# Patient Record
Sex: MALE | Race: OTHER | Employment: FULL TIME | URBAN - METROPOLITAN AREA
[De-identification: names, ages, dates, MRNs, and addresses within clinical notes are randomized per-mention and may not be internally consistent; named-entity substitution may affect disease eponyms.]

---

## 2018-08-07 ENCOUNTER — APPOINTMENT (OUTPATIENT)
Dept: CT IMAGING | Facility: HOSPITAL | Age: 53
DRG: 871 | End: 2018-08-07
Attending: EMERGENCY MEDICINE
Payer: COMMERCIAL

## 2018-08-07 ENCOUNTER — HOSPITAL ENCOUNTER (OUTPATIENT)
Age: 53
Discharge: ANOTHER HOSPITAL | End: 2018-08-07
Attending: EMERGENCY MEDICINE
Payer: COMMERCIAL

## 2018-08-07 ENCOUNTER — APPOINTMENT (OUTPATIENT)
Dept: ULTRASOUND IMAGING | Facility: HOSPITAL | Age: 53
DRG: 871 | End: 2018-08-07
Attending: EMERGENCY MEDICINE
Payer: COMMERCIAL

## 2018-08-07 VITALS
SYSTOLIC BLOOD PRESSURE: 134 MMHG | OXYGEN SATURATION: 95 % | DIASTOLIC BLOOD PRESSURE: 90 MMHG | HEART RATE: 108 BPM | RESPIRATION RATE: 20 BRPM

## 2018-08-07 DIAGNOSIS — R10.13 ABDOMINAL PAIN, EPIGASTRIC: Primary | ICD-10-CM

## 2018-08-07 DIAGNOSIS — R11.2 NAUSEA AND VOMITING IN ADULT: ICD-10-CM

## 2018-08-07 DIAGNOSIS — IMO0001 ALCOHOLISM /ALCOHOL ABUSE: ICD-10-CM

## 2018-08-07 PROBLEM — K85.20 ALCOHOL-INDUCED ACUTE PANCREATITIS: Status: ACTIVE | Noted: 2018-08-07

## 2018-08-07 PROBLEM — K85.20 ALCOHOL-INDUCED ACUTE PANCREATITIS, UNSPECIFIED COMPLICATION STATUS (HCC): Status: ACTIVE | Noted: 2018-08-07

## 2018-08-07 PROBLEM — K85.20 ALCOHOL-INDUCED ACUTE PANCREATITIS, UNSPECIFIED COMPLICATION STATUS: Status: ACTIVE | Noted: 2018-08-07

## 2018-08-07 PROBLEM — K85.20 ALCOHOL-INDUCED ACUTE PANCREATITIS (HCC): Status: ACTIVE | Noted: 2018-08-07

## 2018-08-07 LAB
#LYMPHOCYTE IC: 1.9 X10ˆ3/UL (ref 0.9–3.2)
#MXD IC: 0.8 X10ˆ3/UL (ref 0.1–1)
#NEUTROPHIL IC: 13.2 X10ˆ3/UL (ref 1.3–6.7)
CREAT SERPL-MCNC: 1.1 MG/DL (ref 0.7–1.3)
GLUCOSE BLD-MCNC: 205 MG/DL (ref 65–99)
GLUCOSE BLD-MCNC: 236 MG/DL (ref 70–99)
HCT IC: 52.2 % (ref 37–54)
HGB IC: 18.3 G/DL (ref 13–17)
ISTAT BUN: 16 MG/DL (ref 8–20)
ISTAT CHLORIDE: 102 MMOL/L (ref 101–111)
ISTAT HEMATOCRIT: 54 % (ref 37–53)
ISTAT IONIZED CALCIUM: 0.93 MMOL/L
ISTAT POTASSIUM: 3.5 MMOL/L (ref 3.6–5.1)
ISTAT SODIUM: 137 MMOL/L (ref 136–144)
LYMPHOCYTES NFR BLD AUTO: 12.2 %
MCH IC: 30.8 PG (ref 27–33.2)
MCHC IC: 35.1 G/DL (ref 31–37)
MCV IC: 87.9 FL (ref 80–99)
MIXED CELL %: 5.2 %
NEUTROPHILS NFR BLD AUTO: 82.6 %
PLT IC: 277 X10ˆ3/UL (ref 150–450)
RBC IC: 5.94 X10ˆ6/UL (ref 4.3–5.7)
WBC IC: 15.9 X10ˆ3/UL (ref 4–13)

## 2018-08-07 PROCEDURE — 80047 BASIC METABLC PNL IONIZED CA: CPT

## 2018-08-07 PROCEDURE — 93975 VASCULAR STUDY: CPT | Performed by: EMERGENCY MEDICINE

## 2018-08-07 PROCEDURE — 74177 CT ABD & PELVIS W/CONTRAST: CPT | Performed by: EMERGENCY MEDICINE

## 2018-08-07 PROCEDURE — 96374 THER/PROPH/DIAG INJ IV PUSH: CPT

## 2018-08-07 PROCEDURE — 99204 OFFICE O/P NEW MOD 45 MIN: CPT

## 2018-08-07 PROCEDURE — 85025 COMPLETE CBC W/AUTO DIFF WBC: CPT | Performed by: EMERGENCY MEDICINE

## 2018-08-07 PROCEDURE — 76870 US EXAM SCROTUM: CPT | Performed by: EMERGENCY MEDICINE

## 2018-08-07 PROCEDURE — 82962 GLUCOSE BLOOD TEST: CPT

## 2018-08-07 RX ORDER — SODIUM CHLORIDE 9 MG/ML
1000 INJECTION, SOLUTION INTRAVENOUS ONCE
Status: COMPLETED | OUTPATIENT
Start: 2018-08-07 | End: 2018-08-07

## 2018-08-07 RX ORDER — ONDANSETRON 2 MG/ML
4 INJECTION INTRAMUSCULAR; INTRAVENOUS ONCE
Status: COMPLETED | OUTPATIENT
Start: 2018-08-07 | End: 2018-08-07

## 2018-08-07 NOTE — ED INITIAL ASSESSMENT (HPI)
From [facility] with c/o abdominal pain and nausea sent to r/o pancreatitis. Large alcohol consumption 8 days ago.

## 2018-08-07 NOTE — ED PROVIDER NOTES
Patient Seen in: THE [facility] In [place]    History   Patient presents with:  Abdominal Pain  Nausea/Vomiting/Diarrhea (gastrointestinal)    Stated Complaint: v & d for 3 days     HPI    Patient is a poor historian secondary to his illness a midrange pupils, equal round reactive to light, extraocular movements are intact. Lids and lashes are normal.  Nose: Unremarkable   Throat: Posterior pharynx is normal.  Uvula midline nonswollen. Tongue is nonswollen.   Oromucosa is dry  Neck: Supple, non patient treated with IV fluid by bolus. He was treated with Zofran    Accu-Chek 664  Metabolic panel shows hypokalemia and hyperglycemia.   Creatinine normal  CBC shows elevated white count with a left shift on differential.  Hemoglobin elevated likely rel

## 2018-08-08 ENCOUNTER — APPOINTMENT (OUTPATIENT)
Dept: CV DIAGNOSTICS | Facility: HOSPITAL | Age: 53
DRG: 871 | End: 2018-08-08
Attending: HOSPITALIST
Payer: COMMERCIAL

## 2018-08-08 ENCOUNTER — APPOINTMENT (OUTPATIENT)
Dept: GENERAL RADIOLOGY | Facility: HOSPITAL | Age: 53
DRG: 871 | End: 2018-08-08
Attending: INTERNAL MEDICINE
Payer: COMMERCIAL

## 2018-08-08 PROCEDURE — 93306 TTE W/DOPPLER COMPLETE: CPT | Performed by: HOSPITALIST

## 2018-08-08 PROCEDURE — 71045 X-RAY EXAM CHEST 1 VIEW: CPT | Performed by: INTERNAL MEDICINE

## 2018-08-08 NOTE — PROGRESS NOTES
08/08/18 7039   Clinical Encounter Type   Visited With Patient and family together   Continue Visiting Yes   Crisis Visit Critical care  (issues dealing with alcohol - pt. and wife Marzette  want to talk about problem drinking)   Patient Spiritual Encounter

## 2018-08-08 NOTE — CONSULTS
[facility]  Report of Consultation    Omaira Gomez Patient Status:  Inpatient    1965 MRN [id number]   [facility] 3NE-A Attending Chelsea Brock MD   Hosp Day # 1 PCP Yi Lay MD     Reason for Consultation: Acute pancre ml   Output              575 ml   Net             -575 ml       Physical Exam:   General: alert, uncomfortable/in pain. No respiratory distress. Head: Normocephalic, without obvious abnormality, atraumatic. Eyes: Conjunctivae/corneas clear.   No sclera along with hemoconcentration  Left testicular pain of unclear etiology-urinalysis unremarkable, ultrasound of the testis unremarkable, CT scan of the abdomen without evidence of stone. Plan: Transfer to the intensive care unit. Fluid resuscitate.   Cult

## 2018-08-08 NOTE — PROGRESS NOTES
[facility]  Progress Note     Janet Burden Patient Status:  Inpatient    1965 MRN [id number]   [facility] 3NE-A Attending Safia Lozano MD   Hosp Day # 1 PCP Lori Parekh MD     Chief Complaint: abd pain  S:  Feels diaph 1. Acute pancreatitis suspect 2/2 ETOH  1. Bowel rest, IVF, IV analgesics   2. Sinus tachycardia- now 160 on exam. Afebrile. I suspect 2/2 above and ETOH  1. Stat EKG  2. Telemetry   3. Suspect chronic ETOH with w/d. CIWA 12  1. CIWA protocol   4.  Milta Lighter

## 2018-08-08 NOTE — ED PROVIDER NOTES
Patient Seen in: [facility] Emergency Department    History   Patient presents with:  Abdomen/Flank Pain (GI/)    Stated Complaint: from [facility] r/o pancreatitis     HPI    53-year-old male sent from urgent care for evaluation pancreatitis.   He was s and breath sounds normal. No stridor. Abdominal: Distended, diffusely tender. No CVA tenderness. Musculoskeletal: Exhibits no edema or tenderness. Neurological: Pt is alert and oriented to person, place, and time. No cranial nerve deficit.    Skin: Sk pancreatitis. Patient's EtOH is elevated, though interestingly, he states he has not drunk in a week. He is particularly tender, obtain a CT abdomen pelvis to assure that there is nothing more serious going on such as a perforated ulcer.   Additionally,

## 2018-08-08 NOTE — PROGRESS NOTES
Patient seen again in the intensive care unit. He is awake and interactive. He states he feels better. He has no pain in his testicle at present but notes ongoing abdominal discomfort. He is thirsty. Chest is clear. He is markedly tachycardic.   2D ec

## 2018-08-08 NOTE — H&P
[facility]  History and Physical     Radha Billingsley Patient Status:  Emergency    1965 MRN [id number]   Location [address] [facility] Attending Andriy Holm MD   Hosp Day # 0 PCP Emily Lorenzana MD     Chief Complaint: Ritesh Jacques auscultation bilaterally. No wheezes. No rhonchi. Cardiovascular: S1, S2. Regular rate and rhythm. No murmurs, rubs or gallops. Equal pulses. Chest and Back: No tenderness or deformity.   Abdomen: Midepigastric tenderness  Neurologic: No focal neurologic

## 2018-08-08 NOTE — PLAN OF CARE
Received report from [facility], Katherine Nation , [facility] at [phone]. Pt. Is very drowsy but able to answer questions but is confused, stating he drank 8 days ago, ETOH level on arrival was 291.   IV Thiamine given  Banana Bag running, then LR  EDWARD, Tele:   Seizure precautions Progressing    • Hemodynamic stability and optimal renal function maintained Progressing        NEUROLOGICAL - ADULT    • Achieves stable or improved neurological status Progressing    • Absence of seizures Progressing    • Remains free of injury related t

## 2018-08-08 NOTE — PLAN OF CARE
AOx4  HR elevated, MD aware  CIWA protocol  Electrolyte protocol  Banana bag   2L Lactated ringers  Transferred to ICU, report called to [address]    • Will report anxiety at manageable levels Progressing        CARDIOVASCULAR - ADULT    • Gabrielle RESPIRATORY - ADULT    • Achieves optimal ventilation and oxygenation Progressing        RISK FOR INFECTION - ADULT    • Absence of fever/infection during anticipated neutropenic period Progressing        SAFETY ADULT - FALL    • Free from fall injury Pr

## 2018-08-08 NOTE — CONSULTS
[facility]                       [facility]ation[facility]    Johnna Michael Patient Status:  Inpatient    1965 MRN [id number]   [facility] 4SW-A Attending Kit MD Michael   Hosp Day # 1 PCP HCl (ZOFRAN) injection 4 mg 4 mg Intravenous Once   [] ondansetron HCl (ZOFRAN) 4 MG/2ML injection      [COMPLETED] ondansetron HCl (ZOFRAN) injection 4 mg 4 mg Intravenous Once   [COMPLETED] famoTIDine (PEPCID) injection 20 mg 20 mg Intravenous Onc chest pain, or palpitations            Respiratory: No shortness of breath, asthma, copd, recurrent pneumonia            Hematologic: The patient reports no easy bruising, frequent gum bleeding or nose bleeding;   The patient has no history of known chronic CREATSERUM 0.94 08/08/2018   BUN 19 08/08/2018    08/08/2018   K 3.9 08/08/2018    08/08/2018   CO2 18.0 08/08/2018    08/08/2018   CA 7.6 08/08/2018   ALB 3.3 08/08/2018   ALKPHO 96 08/08/2018   BILT 1.7 08/08/2018    08/08/201 dilatation. No discrete pancreatic lesion is seen. SPLEEN:  No enlargement or focal lesion. KIDNEYS:  No mass, obstruction, or calcification. ADRENALS:  No mass or enlargement. AORTA/VASCULAR:  No aneurysm or dissection.     RETROPERITONEUM:  Per  ml/hr once bolus is complete  2. NPO  3. Consider repeat CT if leukocytosis fails to improve  4. IS hourly once heart rate improves  5. Pain control per PCP recommendations  6. Zofran 4 mg IV q 6 hours as needed for nausea  7.  Repeat CMP, CBC, Mg in

## 2018-08-08 NOTE — CONSULTS
[facility] Urology   Consultation Note    Barry Maria Patient Status:  Inpatient    1965 MRN [id number]   [facility] 4SW-A Attending Odalis Justin MD   Hosp Day # 1 PCP Modesta Marquez MD     Reason for Consultation:  Test morphINE sulfate (PF) 4 MG/ML injection 1 mg, 1 mg, Intravenous, Q2H PRN **OR** morphINE sulfate (PF) 4 MG/ML injection 2 mg, 2 mg, Intravenous, Q2H PRN **OR** morphINE sulfate (PF) 4 MG/ML injection 4 mg, 4 mg, Intravenous, Q2H PRN  •  LORazepam (ATIVAN) suggestive of diffuse fatty infiltration of the liver with focal areas of fatty sparing. BILIARY:  No visible dilatation or calcification.     PANCREAS:  Peripancreatic inflammatory changes are present with some retroperitoneal fluid and peripancreatic fat Alcohol-induced acute pancreatitis     Alcohol-induced acute pancreatitis, unspecified complication status     Sinus tachycardia     Alcohol withdrawal syndrome with complication (HCC)     Tremors of nervous system      Left testicular pain  Testicular exa

## 2018-08-08 NOTE — PROGRESS NOTES
[facility] Pharmacy Note: Antimicrobial Weight Dose Adjustment for: piperacillin/tazobactam (Fanny Evans)    Omaira Gomez is a 48year old male who has been prescribed piperacillin/tazobactam (ZOSYN) 3.375 gm every 8 hours.   CrCl is estimated creatinine clearance is 1

## 2018-08-08 NOTE — ED NOTES
Pt denies testicular pain at this time, abdominal pain continues 5/10. Awaiting ultrasound.  Will continue to monitor

## 2018-08-08 NOTE — PAYOR COMM NOTE
--------------  ADMISSION REVIEW     Payor: [employer]  Subscriber #:  [id number]  Authorization Number: N/A    Admit date: 8/7/18  Admit time: 2308 8/8/18 transferred to ICU      Admitting Physician: Tanesha Lincoln MD  Attending Physician:  Justin Alicia Current:/[id number]   Pulse 101   Temp 98.4 °F (36.9 °C) (Temporal)   Resp 18   Ht 185.4 cm (6' 1\")   Wt 108.9 kg   SpO2 94%   BMI 31.66 kg/m²          Physical Exam      Constitutional: No distress. Appears well-developed and well-nourished.    Head: Normoc ---------                               -----------         ------                     CBC W/ DIFFERENTIAL[[id number]]          Abnormal            Final result                 Please view results for these tests on the individual orders.    URINALYSIS WITH History of Present Illness: Junito Beth is a 48year old male with no known past medical history presents to the emergency department with midepigastric abdominal pain.   Patient reports to me that his last drink was 8 days ago,  for his daughter's 15th b Integument: No rashes or lesions. Psychiatric: Appropriate mood and affect.       Diagnostic Data:      Labs:  Recent Labs   Lab  08/07/18   1730  08/07/18   1835   WBC   --   15.7*   HGB   --   18.3*   MCV  87.9  86.2   PLT   --   263.0       Recent Labs 8/7/2018 1931 Given 20 mg Intravenous Juliet Corona      iohexol (OMNIPAQUE) 350 MG/ML injection 100 mL     Date Action Dose Route User    8/7/2018 2012 Given 100 mL Intravenous Magdalena Ramirez      LORazepam (ATIVAN) injection 1 mg     Date Action Do Admitted on 8/7/2018    Discharged on 8/7/2018 8/7/2018 1740 New Bag 1000 mL Intravenous Enma PRADO RN      sodium chloride 0.9% IV bolus 1,000 mL     Date Action Dose Route User    8/7/2018 2000 New Bag 1000 mL Intravenous Casey Maza

## 2018-08-08 NOTE — ED NOTES
Report received from [facility] at 2160. Pt received resting on cart,c/o left testicular pain 10/10 that patient states started sudden onset in the last 20 minutes. Dr Milton Mabry aware orders received.

## 2018-08-09 ENCOUNTER — APPOINTMENT (OUTPATIENT)
Dept: GENERAL RADIOLOGY | Facility: HOSPITAL | Age: 53
DRG: 871 | End: 2018-08-09
Attending: RADIOLOGY
Payer: COMMERCIAL

## 2018-08-09 ENCOUNTER — APPOINTMENT (OUTPATIENT)
Dept: GENERAL RADIOLOGY | Facility: HOSPITAL | Age: 53
DRG: 871 | End: 2018-08-09
Attending: INTERNAL MEDICINE
Payer: COMMERCIAL

## 2018-08-09 ENCOUNTER — APPOINTMENT (OUTPATIENT)
Dept: ULTRASOUND IMAGING | Facility: HOSPITAL | Age: 53
DRG: 871 | End: 2018-08-09
Attending: PHYSICIAN ASSISTANT
Payer: COMMERCIAL

## 2018-08-09 ENCOUNTER — APPOINTMENT (OUTPATIENT)
Dept: INTERVENTIONAL RADIOLOGY/VASCULAR | Facility: HOSPITAL | Age: 53
DRG: 871 | End: 2018-08-09
Attending: INTERNAL MEDICINE
Payer: COMMERCIAL

## 2018-08-09 PROCEDURE — 71045 X-RAY EXAM CHEST 1 VIEW: CPT | Performed by: INTERNAL MEDICINE

## 2018-08-09 PROCEDURE — 93970 EXTREMITY STUDY: CPT | Performed by: PHYSICIAN ASSISTANT

## 2018-08-09 PROCEDURE — 71045 X-RAY EXAM CHEST 1 VIEW: CPT | Performed by: RADIOLOGY

## 2018-08-09 NOTE — CONSULTS
[facility]  Report of Consultation    Syl Houston Patient Status:  Inpatient    1965 MRN [id number]   [facility] 4SW-A Attending Abby Sung MD   Hosp Day # 2 PCP Nina Suarez MD       Assessment / Plan:    1) MITCH- due to % injection 50 mL, 50 mL, Intravenous, Q15 Min PRN **OR** glucose (DEX4) oral liquid 30 g, 30 g, Oral, Q15 Min PRN **OR** Glucose-Vitamin C (DEX-4) 4-0.006 g chewable tab 8 tablet, 8 tablet, Oral, Q15 Min PRN  •  Insulin Aspart Pen (NOVOLOG) 100 UNIT/ML fl 161   Temp 100 °F (37.8 °C) (Temporal)   Resp 24   Ht 73\"   Wt 231 lb 4.2 oz   SpO2 96%   BMI 30.51 kg/m²   Temp (24hrs), Av.4 °F (36.9 °C), Min:96.8 °F (36 °C), Max:100 °F (37.8 °C)       Intake/Output Summary (Last 24 hours) at 18 1249  Last d

## 2018-08-09 NOTE — PROGRESS NOTES
08/09/18 1501   Clinical Encounter Type   Visited With Patient and family together  (wife and pt's sister at bedside)   Routine Visit Follow-up   Continue Visiting No   Crisis Visit Critical care   Referral From    Referral To Radha Hassan

## 2018-08-09 NOTE — CONSULTS
[facility]  Cardiology Consultation    [facility] Patient Status:  Inpatient    1965 MRN [id number]   [facility] Att[facility] Fidel Tamayo MD   Hosp Day # 2 PCP Ole Dumont MD     Reason for [facility] 500 mL with INFUVITE ADULT 10 mL, Thiamine HCl 989 mg, folic acid 2 mg, magnesium sulfate 8.1001 mEq, Potassium Phosphate Dibasic 40 mmol infusion, , Intravenous, Continuous  •  ondansetron HCl (ZOFRAN) injection 4 mg, 4 mg, Intravenous, Q6H PRN  •  Normal Readings from Last 3 Encounters:  08/09/18 : 231 lb 4.2 oz (104.9 kg)      Physical Exam:   General: mood depressed, lethargic  HEENT: Normocephalic, anicteric sclera, neck supple. Neck: No JVD, carotids 2+, no bruits.   Cardiac: tachy, S1, S2  Lungs: Aretha

## 2018-08-09 NOTE — PLAN OF CARE
CARDIOVASCULAR - ADULT    • Maintains optimal cardiac output and hemodynamic stability Not Progressing        GASTROINTESTINAL - ADULT    • Minimal or absence of nausea and vomiting Not Progressing          DRUG ABUSE/DETOX    • Will have no detox symptoms

## 2018-08-09 NOTE — PROGRESS NOTES
[facility]  Urology Progress Note    Junito Beth Patient Status:  Inpatient    1965 MRN [id number]   [facility] 4SW-A Attending Bernardino Aguirre MD   Hosp Day # 2 PCP Tegan Dunaway MD     Subjective:  Junito Beth is a(n) 48 ye

## 2018-08-09 NOTE — PROGRESS NOTES
Patient tachycardic in 160-170. Assessed and lung sounds clear.       -Give 1 liter of LR  -Give 2 amp D5W bicarb gtt x 1 bag (at 170 ml/hr)  -increase LR infusion after to 200ml/hr  -repeat BMP at 0200  -watch respiratory status closely    Updated Dr SLIM

## 2018-08-09 NOTE — BH PROGRESS NOTE
Talked with the pts nurse and she said, he is not able to have the halle assessment done at this time due to ams. Will check on him tomorrow.

## 2018-08-09 NOTE — PROGRESS NOTES
[facility]  Progress Note     Antony Lopez Patient Status:  Inpatient    1965 MRN [id number]   [facility] 3NE-A Attending Dario Mas MD   Hosp Day # 2 PCP Jovita Moore MD     Chief Complaint: abd pain  S:  Patient dec 5.0*     Recent Labs   Lab  08/07/18   2327  08/09/18   0235   PTP  15.8*  26.7*   INR  1.28*  2.37*     Recent Labs   Lab  08/09/18   0235   TROP  0.118*        Imaging: Imaging data reviewed in Epic.   Medications:   • Insulin Aspart Pen  2-10 Units Subcu

## 2018-08-09 NOTE — PLAN OF CARE
CIWA 4-14, IVP morphine for pain, cards consult, renal consult. Pt remains markedly tachycardic (Sinus), sustained HR 160s throughout the shift. MD's aware. Plan to tx underlying causes (alcohol withdrawal & acute severe pancreatitis).   Lab results boris

## 2018-08-09 NOTE — PROGRESS NOTES
[facility]  Progress Note    Do Hradin Patient Status:  Inpatient    1965 MRN [id number]   [facility] 4SW-A Attending Fidel Tamayo MD   Hosp Day # 2 PCP Ole Dumont MD     Subjective:  Do Hardin is a(n) 48year old renal failure  Leukocytosis  Piuria  Presumed sepsis on antibiotics    Plan:  NPO  IV fluid and electrolyte replacement  Reevaluate tomorrow for [place] placement and trial of enteral nutrition   Appreciate renal input    Total time spent in the care of the pat

## 2018-08-09 NOTE — PLAN OF CARE
Assumed care at [address] [place] [address]. Received patient orientated. Became more confused overnight -- APN aware and kept updated. Ammonia level checked -- APN aware of results. See flowsheet for further assessment. CIWA. Protocol followed, see flowsheets.   Seizure pre

## 2018-08-09 NOTE — PROGRESS NOTES
[facility]  Progress Note    Migdalia Daniel Patient Status:  Inpatient    1965 MRN [id number]   [facility] 4SW-A Attending Cecil Morgan MD   Hosp Day # 2 PCP Venkatesh Butler MD     STATUS UPDATE: The patient has had a \"rough\" n thyromegaly. Lungs: Clear without wheezes or crackles   Chest wall: No tenderness or deformity. Heart: Regular rate and rhythm, regular presumed sinus, normal S1S2, no murmur.    Abdomen: Soft, non-tender, moderately distended/not tense, no masses, no g testicular pain of unclear etiology-urinalysis unremarkable, ultrasound of the testis unremarkable, CT scan of the abdomen without evidence of stone. Urology consult appreciated-no significant testicular pathology suspected  5.  Evolving renal insufficienc

## 2018-08-10 ENCOUNTER — APPOINTMENT (OUTPATIENT)
Dept: GENERAL RADIOLOGY | Facility: HOSPITAL | Age: 53
DRG: 871 | End: 2018-08-10
Attending: INTERNAL MEDICINE
Payer: COMMERCIAL

## 2018-08-10 PROCEDURE — 71045 X-RAY EXAM CHEST 1 VIEW: CPT | Performed by: INTERNAL MEDICINE

## 2018-08-10 NOTE — PLAN OF CARE
ANXIETY    • Will report anxiety at manageable levels Not Progressing        Delirium    • Minimize duration of delirium Not Progressing        DISCHARGE PLANNING    • Discharge to home or other facility with appropriate resources Not Progressing within normal limits Progressing        NEUROLOGICAL - ADULT    • Absence of seizures Progressing    • Remains free of injury related to seizure activity Progressing        PAIN - ADULT    • Verbalizes/displays adequate comfort level or patient's stated pa

## 2018-08-10 NOTE — PROGRESS NOTES
[facility]  Progress Note     Jeb Kareneleuterio Patient Status:  Inpatient    1965 MRN [id number]   [facility] 3NE-A Attending Bryson Garcia MD   Hosp Day # 3 PCP Tanesha Holguin MD     Chief Complaint: abd pain    S:  Patient l 8.4*   --   3.7*   TP  7.0   --   5.0*   --   5.3*    < > = values in this interval not displayed.      Recent Labs   Lab  08/07/18   2327  08/09/18   0235  08/10/18   0434   PTP  15.8*  16.3*  15.7*   INR  1.28*  1.34*  1.20*     Recent Labs   Lab  08/09/1 full  · Nina: yes - UO improved    Estimated date of discharge: TBD    Case d/w spouse, RN.        Byron TERRY  10:17 AM     Addendum  Patient seen and examined  Remains confused but looks little better today  Chest: Diminished B/L  CVS: S1, S2, tac

## 2018-08-10 NOTE — PROGRESS NOTES
[facility]  Progress Note    Kamla Weathers Patient Status:  Inpatient    1965 MRN [id number]   [facility] 4SW-A Attending Julien Grullon MD   Hosp Day # 3 PCP Tyrel Robertson MD     Subjective:  Kamla Weathers is a(n) 48year old failure  Leukocytosis  Piuria culture negative  Presumed sepsis on antibiotics    Plan:  NPO  IV fluid and electrolyte replacement  Reevaluate tomorrow for NJ placement and trial of enteral nutrition once bowel more active  Appreciate renal input    Total

## 2018-08-10 NOTE — BH PROGRESS NOTE
Talked with the pts nurse, Kj Ta and she said, the pt is not assessable yet. The pt will be checked on by halle at a later time.

## 2018-08-10 NOTE — PROGRESS NOTES
[facility]  Progress Note    Barry Maria Patient Status:  Inpatient    1965 MRN [id number]   [facility] 4SW-A Attending Odalis Justin MD   Hosp Day # 3 PCP Modesta Marquez MD     Subjective:  Barry Maria is a(n) 48year old m Lab  08/08/18   0615  08/09/18   0235  08/10/18   0434   RBC  6.44*  5.86*  4.73   HGB  19.6*  18.3*  14.4   HCT  56.7*  52.3  42.9   MCV  88.0  89.2  90.7   MCH  30.4  31.2  30.4   MCHC  34.6  35.0  33.6   RDW  13.0  13.4  13.4   NEPRELIM  25.98*  27.67 loaded. His chest x-ray is showing some issues with pleural effusion and atelectasis. Hemoconcentration is better. Coagulation studies appear to have responded to vitamin K administration-a good sign as regards liver function. Bilirubin is decreasing.

## 2018-08-10 NOTE — PROGRESS NOTES
[address] [place] [address] Specialists. Seen in ICU. Still sleepy and lethargic. According to family he was asking about work earlier in the day.     BP 94/65 (BP Location: Left arm)   Pulse (!) 122   Temp 99.5 °F (37.5 °C) (Temporal)   Resp 21   Ht 6' 1\" (1.854 m

## 2018-08-10 NOTE — PROGRESS NOTES
[facility]  Nephrology Progress Note    Alan Rose Attending:  Olga Marin MD       Assessment and Plan:    1) MITCH- due to hypovolemia, pancreatitis and contrast nephropathy (CT 8/7) now with improving UO and Cr plateauing with resolution of me 08/10/2018   CO2 29.0 08/10/2018    08/10/2018   CA 7.6 08/10/2018   ALB 1.9 08/10/2018   ALKPHO 65 08/10/2018   BILT 3.7 08/10/2018   TP 5.3 08/10/2018   AST 86 08/10/2018   ALT 90 08/10/2018   PTT 33.6 08/10/2018   INR 1.20 08/10/2018   PTP 15.7 0 PRN   lactated ringers infusion  Intravenous Continuous   LORazepam (ATIVAN) injection 1 mg 1 mg Intravenous Q30 Min PRN   LORazepam (ATIVAN) injection 2 mg 2 mg Intravenous Q15 Min PRN   LORazepam (ATIVAN) injection 3 mg 3 mg Intravenous Q15 Min PRN   TONG

## 2018-08-10 NOTE — PLAN OF CARE
GASTROINTESTINAL - ADULT    • Maintains or returns to baseline bowel function Not Progressing          RESPIRATORY - ADULT    • Achieves optimal ventilation and oxygenation Not Progressing          RISK FOR INFECTION - ADULT    • Absence of fever/infection

## 2018-08-10 NOTE — DIETARY NOTE
[facility]    NUTRITION INITIAL ASSESSMENT    Pt does not meet malnutrition criteria. NUTRITION DIAGNOSIS/PROBLEM:    Inadequate oral intake related to physiological causes as evidenced by pt NPO x 3 days and anticipated need for EN.     NUTRITION I 83.6 kg  Calories: 3150-1637 calories/day (25-30 calories per kg)  Protein: 100-125 grams protein/day (1.2-1.5 grams protein per kg)  Fluid: ~1 ml/kcal or per MD discretion    MONITOR AND EVALUATE/NUTRITION GOALS:    3. No signs of skin breakdown  4.  Maint

## 2018-08-10 NOTE — PAYOR COMM NOTE
--------------  CONTINUED STAY REVIEW    Payor: [employer]  Subscriber #:  [id number]  Authorization Number: [id number]    Admit date: 8/7/18  Admit time: 2308    Admitting Physician: Librado Castro MD  Attending Physician:  Abby Sung MD  Primary Car abuse  Etoh withdrawal  Etoh liver disease, hepatitis LFT's improving  Acute renal failure  Leukocytosis  Piuria culture negative  Presumed sepsis on antibiotics     Plan:  NPO  IV fluid and electrolyte replacement  Reevaluate tomorrow for NJ placement and (none) Intravenous Paula Lozano RN    8/9/2018 2038 New Bag (none) Intravenous Crystal Lyons, RN      dextrose 5 % 500 mL with INFUVITE ADULT 10 mL, Thiamine HCl 615 mg, folic acid 2 mg, magnesium sulfate 8.1001 mEq, Potassium Phosphate Dibasic 40

## 2018-08-11 ENCOUNTER — APPOINTMENT (OUTPATIENT)
Dept: GENERAL RADIOLOGY | Facility: HOSPITAL | Age: 53
DRG: 871 | End: 2018-08-11
Attending: INTERNAL MEDICINE
Payer: COMMERCIAL

## 2018-08-11 PROCEDURE — 71045 X-RAY EXAM CHEST 1 VIEW: CPT | Performed by: INTERNAL MEDICINE

## 2018-08-11 NOTE — PROGRESS NOTES
[facility]  Progress Note    Migdalia Daniel Patient Status:  Inpatient    1965 MRN [id number]   [facility] 4SW-A Attending Cecil Morgan MD   Hosp Day # 4 PCP Venkatesh Butler MD     Subjective:  Migdalia Daniel is a(n) 48year old m 2.76*  2.68*  2.10*   GFRAA  29*  30*  41*   GFRNAA  25*  26*  35*   CA  7.4*  7.6*  7.8*   NA  137  139  145*   K  4.6  3.9  4.0   CL  101  103  107   CO2  27.0  29.0  33.0*     Recent Labs   Lab  08/11/18   0738   ABGPHT  7.44   LAOGWJ4O  45   BJPUD4M  7

## 2018-08-11 NOTE — PLAN OF CARE
Assumed care at [address] [place] [address] Road. Lethargic but becomes restless when stimulated. See full assessment on flowsheet. CIWA. Precedex drip. PRN ativan per protocol. Seizure precautions. Received on 6L NC.  ST/SR. CVPs Q4H.  NPO. Accuchecks Q6H. Nina.   Bedres

## 2018-08-11 NOTE — PROGRESS NOTES
[facility]  Progress Note     Migdalia Yu Patient Status:  Inpatient    1965 MRN [id number]   [facility] 3NE-A Attending Alec Gloria MD   Hosp Day # 4 PCP Dorian Griffin MD     Chief Complaint: abd pain    S:  Patient l 1.20*  1.16*     Recent Labs   Lab  08/09/18   0235   TROP  0.118*        Imaging: Imaging data reviewed in Epic.   Medications:   • Ipratropium Bromide  0.5 mg Nebulization 4 times per day   • insulin detemir  6 Units Subcutaneous Daily   • Insulin Aspart

## 2018-08-11 NOTE — PLAN OF CARE
DECISION MAKING    • Pt/Family able to effectively weigh alternatives and participate in decision making related to treatment and care Not Progressing        Delirium    • Minimize duration of delirium Not Progressing        DRUG ABUSE/DETOX    • Will have Goal Progressing        RESPIRATORY - ADULT    • Achieves optimal ventilation and oxygenation Progressing        RISK FOR INFECTION - ADULT    • Absence of fever/infection during anticipated neutropenic period Progressing        SAFETY ADULT - FALL    • Fr

## 2018-08-11 NOTE — PROGRESS NOTES
[facility]  Nephrology Progress Note    Barry Maria Attending:  Odalis Justin MD       Assessment and Plan:    1) MITCH- due to hypovolemia, pancreatitis and contrast nephropathy (CT 8/7) now with excellent UO, decreasing Cr and resolution of metabo 7.8 08/11/2018   ALB 1.7 08/11/2018   ALKPHO 63 08/11/2018   BILT 2.9 08/11/2018   TP 5.5 08/11/2018   AST 55 08/11/2018   ALT 60 08/11/2018   PTT 35.6 08/11/2018   INR 1.16 08/11/2018   PTP 15.3 08/11/2018   MG 3.2 08/11/2018   PHOS 4.3 08/11/2018   PGLU injection 1 mg 1 mg Intravenous Q30 Min PRN   LORazepam (ATIVAN) injection 2 mg 2 mg Intravenous Q15 Min PRN   LORazepam (ATIVAN) injection 3 mg 3 mg Intravenous Q15 Min PRN   LORazepam (ATIVAN) injection 4 mg 4 mg Intravenous Q10 Min PRN   acetaminophen (

## 2018-08-11 NOTE — PROGRESS NOTES
[facility]  Progress Note    Radha Billingsley Patient Status:  Inpatient    1965 MRN [id number]   [facility] 4SW-A Attending Ke Law MD   Hosp Day # 4 PCP Emily Lorenzana MD     Subjective:  Radha Billingsley is a(n) 48year old improving  Leukocytosis resolved    Plan:  NPO  IV fluid and electrolyte replacement  Reevaluate tomorrow for NJ placement and trial of enteral nutrition once bowel more active  Swallow evaluation when more awake    Total time spent in the care of the aby

## 2018-08-12 NOTE — PROGRESS NOTES
[facility]  Progress Note    Sj Villalobos Patient Status:  Inpatient    1965 MRN [id number]   [facility] 4SW-A Attending Laci Whittaker MD   Hosp Day # 5 PCP Herman Hung MD     Subjective:  Sj Villalobos is a(n) 48year old m 35*   CREATSERUM  2.68*  2.10*  1.34*   GFRAA  30*  41*  70   GFRNAA  26*  35*  61   CA  7.6*  7.8*  7.8*   NA  139  145*  147*   K  3.9  4.0  4.2   CL  103  107  111   CO2  29.0  33.0*  32.0     Recent Labs   Lab  08/11/18   0738   ABGPHT  7.44   ABGPCO2

## 2018-08-12 NOTE — PROGRESS NOTES
[facility]  Progress Note    Migdalia Daniel Patient Status:  Inpatient    1965 MRN [id number]   [facility] 4SW-A Attending Cecil Morgan MD   Hosp Day # 5 PCP Venkatesh Butler MD     Subjective:  Migdalia Daniel is a(n) 48year old improving  Leukocytosis resolved  Normocytic anemia    Plan:  NPO  IV fluid and electrolyte replacement  Swallow evaluation tomorrow  Iron studies, B12 and folate levels    Total time spent in the care of the patient today: 25 minutes.     Jalen Fagan

## 2018-08-12 NOTE — PROGRESS NOTES
[facility]  Progress Note     Sj Villalobos Patient Status:  Inpatient    1965 MRN [id number]   [facility] 3NE-A Attending Laci Whittaker MD   Hosp Day # 5 PCP Herman Hung MD     Chief Complaint: abd pain    S:  Patient m 0.5 mg Nebulization 4 times per day   • insulin detemir  6 Units Subcutaneous Daily   • Insulin Aspart Pen  2-10 Units Subcutaneous Q6H   • Normal Saline Flush  10 mL Intravenous Q12H   • piperacillin-tazobactam  4.5 g Intravenous Q8H     ASSESSMENT / PLAN giving ativan  abx per Intensivist  Appreciate consults     Talita Youngblood MD

## 2018-08-12 NOTE — PLAN OF CARE
GASTROINTESTINAL - ADULT    • Maintains or returns to baseline bowel function Not Progressing        NEUROLOGICAL - ADULT    • Achieves maximal functionality and self care Not Progressing          ANXIETY    • Will report anxiety at manageable levels Progr Goal Progressing    • Patient/Family Short Term Goal Progressing        RESPIRATORY - ADULT    • Achieves optimal ventilation and oxygenation Progressing        RISK FOR INFECTION - ADULT    • Absence of fever/infection during anticipated neutropenic perio

## 2018-08-12 NOTE — PLAN OF CARE
ANXIETY    • Will report anxiety at manageable levels Progressing        CARDIOVASCULAR - ADULT    • Maintains optimal cardiac output and hemodynamic stability Progressing    • Absence of cardiac arrhythmias or at baseline Progressing        COPING    • Pt RESPIRATORY - ADULT    • Achieves optimal ventilation and oxygenation Progressing        RISK FOR INFECTION - ADULT    • Absence of fever/infection during anticipated neutropenic period Progressing        SAFETY ADULT - FALL    • Free from fall injury Pr

## 2018-08-12 NOTE — PROGRESS NOTES
[facility]  Nephrology Progress Note    Sj Villalobos Attending:  Laci Whittaker MD       Assessment and Plan:    1) MITCH- due to hypovolemia, pancreatitis and contrast nephropathy (CT 8/7) now with excellent UO, decreasing Cr and resolution of metabo BUN 35 08/12/2018    08/12/2018   K 4.2 08/12/2018    08/12/2018   CO2 32.0 08/12/2018    08/12/2018   CA 7.8 08/12/2018   ALB 1.7 08/12/2018   ALKPHO 82 08/12/2018   BILT 2.4 08/12/2018   TP 5.5 08/12/2018   AST 72 08/12/2018   ALT 60 3 mg Intravenous Q15 Min PRN   LORazepam (ATIVAN) injection 4 mg 4 mg Intravenous Q10 Min PRN   acetaminophen (TYLENOL) tab 650 mg 650 mg Oral Q6H PRN   morphINE sulfate (PF) 4 MG/ML injection 1 mg 1 mg Intravenous Q2H PRN   Or      morphINE sulfate (PF) 4

## 2018-08-13 ENCOUNTER — APPOINTMENT (OUTPATIENT)
Dept: CT IMAGING | Facility: HOSPITAL | Age: 53
DRG: 871 | End: 2018-08-13
Attending: NURSE PRACTITIONER
Payer: COMMERCIAL

## 2018-08-13 ENCOUNTER — APPOINTMENT (OUTPATIENT)
Dept: CT IMAGING | Facility: HOSPITAL | Age: 53
DRG: 871 | End: 2018-08-13
Attending: INTERNAL MEDICINE
Payer: COMMERCIAL

## 2018-08-13 ENCOUNTER — APPOINTMENT (OUTPATIENT)
Dept: GENERAL RADIOLOGY | Facility: HOSPITAL | Age: 53
DRG: 871 | End: 2018-08-13
Attending: INTERNAL MEDICINE
Payer: COMMERCIAL

## 2018-08-13 PROCEDURE — 99152 MOD SED SAME PHYS/QHP 5/>YRS: CPT | Performed by: NURSE PRACTITIONER

## 2018-08-13 PROCEDURE — 99153 MOD SED SAME PHYS/QHP EA: CPT | Performed by: NURSE PRACTITIONER

## 2018-08-13 PROCEDURE — 74177 CT ABD & PELVIS W/CONTRAST: CPT | Performed by: INTERNAL MEDICINE

## 2018-08-13 PROCEDURE — 71045 X-RAY EXAM CHEST 1 VIEW: CPT | Performed by: INTERNAL MEDICINE

## 2018-08-13 PROCEDURE — 49406 IMAGE CATH FLUID PERI/RETRO: CPT | Performed by: NURSE PRACTITIONER

## 2018-08-13 NOTE — PROGRESS NOTES
[facility]    Progress Note    Dorcus Grim Patient Status:  Inpatient    1965 MRN [id number]   [facility] 4SW-A Attending Kerry Gomez MD   Hosp Day # 6 PCP Linda Park MD        Subjective:     Constitutional: Negative. 0.147 (L) 08/08/2018    (H) 08/12/2018   DDIMER 4.20 (H) 08/09/2018   MG 2.4 08/13/2018   PHOS 3.3 08/12/2018   TROP 0.118 (HH) 08/09/2018   B12 >2,000 (H) 08/12/2018   ETOH 291 (H) 08/07/2018             Oscar Prater MD  8/13/2018

## 2018-08-13 NOTE — PAYOR COMM NOTE
--------------  CONTINUED STAY REVIEW    Payor: [facility] [facility]  Subscriber #:  [id number]  Authorization Number: [id number]    Admit date: 8/7/18  Admit time: 2308    Admitting Physician: Aundrea Armstrong MD  Attending Physician:  Bernardino Aguirre MD  Primary Car Recent Labs   Lab  08/10/18   0434  08/11/18   0447  08/12/18   0426   PTP  15.7*  15.3*  14.9*   INR  1.20*  1.16*  1.12*          Recent Labs   Lab  08/09/18   0235   TROP  0.118*      Imaging: Imaging data reviewed in Epic.   Medications:   • Insulin Asp Pt clinically deteriorating  Would cont abx for possible infected pancreatitis with low grade fever.     I am more suspecting necrotizing pancreatitis  Check CT Abdomen  Agree with TPN for now  Blood Cx sent  Check UA  Appreciate Consults         Kwabena Watts Impression  Acute interstitial pancreatitis  Etoh abuse  Etoh withdrawal  Etoh liver disease, hepatitis LFT's improving  Acute renal failure improving  Leukocytosis resolved  Normocytic anemia     Plan:  NPO  IV fluid and electrolyte replacement  Swallow e 08/13/18 0900  104.1 °F (40.1 °C) 106 20 145/89 95 % -- -- -- LB   08/13/18 0800 -- 99 20 142/75 94 % -- -- -- LB   08/13/18 0700 -- 89 15 118/70 96 % -- -- -- RD   08/13/18 0600 -- 91 13 124/74 96 % -- -- -- RD   08/13/18 0500 -- 88 13 116/69 95 % 244 lb 8/13/2018 0604 Given 2 Units Subcutaneous (Left Upper Arm) Kathy Epstein RN    8/12/2018 2333 Given 2 Units Subcutaneous (Left Upper Arm) Kathy Epstein RN    8/12/2018 1732 Given 2 Units Subcutaneous (Left Upper Arm) Leonid Gutierrez RN    8/12/2018 1206 Given 8/12/2018 1353 Given 2 mg Intravenous [place] Boop, RN    8/12/2018 1321 Given 2 mg Intravenous [place] Boop, RN    8/12/2018 1244 Given 2 mg Intravenous [place] Leona, RN      morphINE sulfate (PF) 4 MG/ML injection 2 mg     Date Action Dose Route U

## 2018-08-13 NOTE — PLAN OF CARE
GASTROINTESTINAL - ADULT    • Minimal or absence of nausea and vomiting Not Progressing    • Maintains or returns to baseline bowel function Not Progressing          NEUROLOGICAL - ADULT    • Achieves stable or improved neurological status Not Progressing

## 2018-08-13 NOTE — PROCEDURES
[facility]  Pre-Procedure Note    Name: Jennifer Gibson  MRN#: [id number]  : 1965    Procedure:  CT guided aspiration with possible abscess drain placement    Indication: Abdominal Abscess    Allergies:    No Known Allergies    Pertinent Medicatio

## 2018-08-13 NOTE — IMAGING NOTE
Pt tolerated procedure well, vss on RA, presently denies pain and dressing is CDI. Pt updated on procedure and plan of care, report given to Lakhwinder Levan Abe in CT and awaiting transport back to room# 456 w/ICU RN at bedside.

## 2018-08-13 NOTE — PROGRESS NOTES
[facility]  Progress Note     Barry Maria Patient Status:  Inpatient    1965 MRN [id number]   [facility] 3NE-A Attending Odalis Justin MD   Hosp Day # 6 PCP Modesta Marquez MD     Chief Complaint: abd pain    S:  Patient m 1.12*     Recent Labs   Lab  08/09/18   0235   TROP  0.118*        Imaging: Imaging data reviewed in Epic.   Medications:   • Insulin Aspart Pen  2-10 Units Subcutaneous 4 times per day   • Ipratropium Bromide  0.5 mg Nebulization 4 times per day   • insuli necrotizing pancreatitis  Check CT Abdomen  Agree with TPN for now  Blood Cx sent  Check UA  Appreciate Consults       Brandon Palomares MD

## 2018-08-13 NOTE — PROGRESS NOTES
[facility]  Progress Note    Antony Lopez Patient Status:  Inpatient    1965 MRN [id number]   [facility] 4SW-A Attending Dario Mas MD   Hosp Day # 6 PCP Jovita Moore MD     Subjective:  Antony Lopez is a(n) 48year old m 96.2  96.7   MCH  31.1  31.1  31.4   MCHC  32.8  32.3  32.5   RDW  13.9  13.8  13.9   NEPRELIM  8.07*  7.22*  6.41   WBC  10.1  9.2  8.9   PLT  97.0*  95.0*  131.0*     Recent Labs   Lab  08/11/18   0447  08/12/18   0426  08/13/18   0440   GLU  208*  142*

## 2018-08-13 NOTE — PHYSICAL THERAPY NOTE
PHYSICAL THERAPY EVALUATION - INPATIENT     Room Number: 456/456-A  Evaluation Date: 8/13/2018  Type of Evaluation: Initial  Physician Order: PT Eval and Treat    Presenting Problem: ETOH induced pancreatitis, MITCH, ATN, shock  Reason for Therapy:  Mob gait w/o device and drives. 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 SUBJECTIVE  \"I really want water\" - when given sponge to clean mouth pt st \"Ah, my paleta\"    Patient self-stated goal is to drink and eat.     OBJECTIVE  Precautions: Maurizio Espinal Device: Nasal cannula  Liters of O2:  5L  Shortness of breath  Heart Rate: 106  Blood Pressure: 129/80 in supine, 142/72 and then 138/84   Pt was briefly on room air, did desaturate, O2 reapplied.     AM-PAC '6-Clicks' INPATIENT SHORT FORM - BASIC MOBILITY later today. Family aware of plan as well. Exercise/Education Provided:  Bed mobility  Functional activity tolerated  Lower therapeutic exercise:   Ankle pumps  Heel slides  LAQ  Upper therapeutic exercise:  Elbow flex/ext,  - open/close and OH Reac training  Rehab Potential : Good  Frequency (Obs): 5x/week  Number of Visits to Meet Established Goals: 5      CURRENT GOALS    Goal #1 Patient is able to demonstrate supine - sit EOB @ level: minimum assistance     Goal #2 Patient is able to demonstrate t

## 2018-08-13 NOTE — PROCEDURES
[address] Patient Status:  Inpatient    1965 MRN [id number]   [facility] 4SW-A Attending Meme Aburto MD   Hosp Day # 6 PCP Mariaelena Whatley MD         Brief Procedure Report    Pre-Operative Diagnosis: Abdomina

## 2018-08-13 NOTE — PROGRESS NOTES
[facility]    Gastroenterology Follow-Up Note      Worthy Bash Patient Status:  Inpatient    1965 MRN [id number]   [facility] 4SW-A Attending Joanna Alanis MD   Hosp Day # 6 PCP Aurelia Chauhan MD     Chief Complaint/Reason f report for full details. Assessment/Plan:     Acute Alcoholic Pancreatitis   -agree with repeat CT.  Consider progression to necrotizing vs. Infectious acute pancreatitis.   -Agree with TPN  - Endoscopic placement of NJ-tube not safe option at this ti

## 2018-08-14 NOTE — PROGRESS NOTES
[facility]  Progress Note     Phoebe Garcia Patient Status:  Inpatient    1965 MRN [id number]   [facility] 3NE-A Attending Sheldon Massey MD   Hosp Day # 7 PCP John Gipson MD     Chief Complaint: abd pain    S:  Patient m Lab  08/11/18   0447  08/12/18   0426  08/13/18   1447   PTP  15.3*  14.9*  14.9*   INR  1.16*  1.12*  1.19*     Recent Labs   Lab  08/09/18   0235   TROP  0.118*        Imaging: Imaging data reviewed in Epic.   Medications:   • custom IV maintenance flui of necrotic vs severe pancreatitis but will r/o infection particularly with his fevers   Cont IV abx for now  Starting CLD  IV analgesics  Ativan only PRN- doing much better  Off precedex    Mirna Van MD

## 2018-08-14 NOTE — PROGRESS NOTES
[facility]  Progress Note    Jeb Bautista Patient Status:  Inpatient    1965 MRN [id number]   [facility] 4SW-A Attending Bryson Garcia MD   Hosp Day # 7 PCP Tanesha Holguin MD     Subjective:  Jeb Bautista is a(n) 48year old m 08/14/18   0411   RBC  3.41*  3.34*  3.39*   HGB  10.6*  10.5*  10.6*   HCT  32.8*  32.3*  31.9*   MCV  96.2  96.7  94.1   MCH  31.1  31.4  31.3   MCHC  32.3  32.5  33.2   RDW  13.8  13.9  13.6   NEPRELIM  7.22*  6.41  5.88   WBC  9.2  8.9  8.4   PLT  95. 0

## 2018-08-14 NOTE — BH PROGRESS NOTE
Went to see the pt for f/u with his etoh. Pt stated he is very tired. When asked about his etoh use. He states he hasn't drank in 3 weeks. He admits to drinking Tequilla 2x per week.   He states he would like a CD program.  Unable to discuss this more w

## 2018-08-14 NOTE — PLAN OF CARE
Pt a&ox 3-4, calm and cooperative with care. CIWA 2-4. Up to chair, tolerated fairly well. Up with walker & 2 assist.  IVP prn morphine for left abdominal pain. Minimal output via Left abdominal pancreatic drain (5ml brown liquid).   See further assessm

## 2018-08-14 NOTE — PHYSICAL THERAPY NOTE
PHYSICAL THERAPY TREATMENT NOTE - INPATIENT    Room Number: 456/456-A     Session: 1     Number of Visits to Meet Established Goals: 5    Presenting Problem: ETOH induced pancreatitis, MITCH, ATN, shock    History related to current admission: Pt is 48 year abdominal side - area of drain. Management Techniques: Repositioning; Other (Comment) (nursing dosed w/pain med)    BALANCE                                                                                                                     Static Sitting: needed effort and that we were not just going to move him, for his own health benefits and safety. Pt increasingly compliant during therapy session. Also, encouraged pt to stay awake during the day in order to not confuse day and night.      Mobility as i #1 Patient is able to demonstrate supine - sit EOB @ level: minimum assistance      Goal #2 Patient is able to demonstrate transfers EOB to/from [facility] at assistance level: moderate assistance      Goal #3 Patient is able to ambulate 10 feet with assist devic

## 2018-08-14 NOTE — PROGRESS NOTES
[facility]  Progress Note      Brendan Bunch Patient Status:  Inpatient    1965 MRN [id number]   [facility] 4SW-A Attending Ivania Campos MD   Hosp Day # 7 PCP Tres Avila MD       Subjective:   No complaints this AM    Gill Bishop

## 2018-08-14 NOTE — PAYOR COMM NOTE
--------------  CONTINUED STAY REVIEW    Payor: [facility] PPO  Subscriber #:  [id number]  Authorization Number: [id number]    Admit date: 8/7/18  Admit time: 2308    Admitting Physician: Jd Gao MD  Attending Physician:  Julien Grullon MD  Primary Car 72*  60*  45*   ALT  60  61  55   BILT  2.4*  2.8*  2.9*   TP  5.5*  5.6*  5.8*            Recent Labs   Lab  08/11/18   0447  08/12/18   0426  08/13/18   1447   PTP  15.3*  14.9*  14.9*   INR  1.16*  1.12*  1.19*          Recent Labs   Lab  08/09/18   02 tender, BS+  EXT: No c/c     Imaging: Reviewed  Agree with above  Drain output relatively clear- suspecting more of necrotic vs severe pancreatitis but will r/o infection particularly with his fevers   Cont IV abx for now  Starting CLD  IV analgesics  Ativ (ATROVENT) 0.02 % nebulizer solution 0.5 mg     Date Action Dose Route User    8/14/2018 0735 Given 0.5 mg Nebulization Faby Maciel    8/14/2018 0042 Given 0.5 mg Nebulization Remedios Bach RCP    8/13/2018 1923 Given 0.5 mg Nebulization Due 8/14/2018 1008 New Bag 4.5 g Intravenous Cande Amado RN    8/13/2018 2349 New Bag 4.5 g Intravenous Cristiana Gilmore RN    8/13/2018 1653 New Bag 4.5 g Intravenous Sarah Clayton RN      0.9%  NaCl infusion     Date Action Dose Route User

## 2018-08-14 NOTE — PLAN OF CARE
Assumed care of pt @1900; resting in bed. Pt is very restless in bed. Alert; but confused off and on. Bed alarm remains on. Febrile. ST on monitor. TPN infusing through R TLC. MANAN drain to LLQ. Abdomen with some distention; tried to use bedpan; no BM.  Morph

## 2018-08-14 NOTE — PROGRESS NOTES
[facility]    Progress Note    Espinoza Hurst Patient Status:  Inpatient    1965 MRN [id number]   [facility] 4SW-A Attending Nicolasa Clemente MD   Hosp Day # 7 PCP Mattie Pardo MD        Subjective:     Constitutional: Negative. 2.9 (H) 08/14/2018   TP 5.8 (L) 08/14/2018   AST 45 (H) 08/14/2018   ALT 55 08/14/2018   PTT 33.1 08/12/2018   INR 1.19 (H) 08/13/2018   T4F 1.0 08/08/2018   TSH 0.147 (L) 08/08/2018    (H) 08/12/2018   DDIMER 4.20 (H) 08/09/2018   MG 2.1 08/14/2018

## 2018-08-14 NOTE — OCCUPATIONAL THERAPY NOTE
OCCUPATIONAL THERAPY EVALUATION - INPATIENT     Room Number: 456/456-A  Evaluation Date: 8/14/2018  Type of Evaluation: Initial  Presenting Problem: Alcohol induced pancreatitis, MITCH, ATN, shock, trombocytopenia    Physician Order: IP Consult to Occupation functional mobility without use of AD. Patient drives and was working full time. SUBJECTIVE   Patient reports     Patient self-stated goal is to get strong and go home.      OBJECTIVE  Precautions: Seizure;Limb alert - right;Bed/chair alarm  Fall Risk: Impairment: 59.67%  Standardized Score (AM-PAC Scale): 33.39  CMS Modifier (G-Code): CK    FUNCTIONAL TRANSFER ASSESSMENT  Supine to Sit : Maximum assistance  Sit to Stand: Maximum assistance    Skilled Therapy Provided: Patient educated on OT role, goals, improved medical status. Will continue to assess. In this OT evaluation patient presents with the following performance deficits: decreased balance, endurance, alertness, processing, sequencing, GMC, safety awareness, safety judgement.  These deficits impa

## 2018-08-14 NOTE — PROGRESS NOTES
[facility]    Gastroenterology Follow-Up Note      Debra Olmos Patient Status:  Inpatient    1965 MRN [id number]   [facility] 4SW-A Attending Corey Leon MD   Hosp Day # 7 PCP Severa Boop, MD     Chief Complaint/Reason f report. See complete report for full details. CT 8/13/18 personally viewed- Acute pancreatitis. Fluid collection near gastric antrum. No additions to radiology report. CONCLUSION:    1.  Compared to most recent CT of the abdomen and pelvis performed

## 2018-08-14 NOTE — CM/SW NOTE
Responding to order for discharge planning. Pt with improving mentation, CIWA on precedex. Currently TPN and trialing clears. POS #1 S/P IR drain (nick-pancreatic fluid). Met and spoke with pt and daughter Sesar Haro at ICU bedside.  Pt advises he lives in

## 2018-08-15 NOTE — PAYOR COMM NOTE
--------------  CONTINUED STAY REVIEW    Payor: [facility] PP  Subscriber #:  [id number]  Authorization Number: [id number]    Admit date: 8/7/18  Admit time: 2308    Admitting Physician: Shree Amos MD  Attending Physician:  Efrain Martinez MD  Primary Ca Route User    8/14/2018 1600 Rate/Dose Verify (none) Intravenous Paulette Gama RN    8/14/2018 1200 Rate/Dose Verify (none) Intravenous Paulette Gama RN      adult 3 in 1 tpn     Date Action Dose Route User    8/15/2018 0400 Rate/Dose Verify (none) 10 mL     Date Action Dose Route User    8/15/2018 0422 Given 10 mL Intravenous Guadlupe Crigler, RN      Pantoprazole Sodium (PROTONIX) 40 mg in Sodium Chloride 0.9 % 10 mL IV push     Date Action Dose Route User    8/14/2018 1230 Given 40 mg Intravenous

## 2018-08-15 NOTE — PHYSICAL THERAPY NOTE
PHYSICAL THERAPY TREATMENT NOTE - INPATIENT    Room Number: 456/456-A     Session: 2     Number of Visits to Meet Established Goals: 5    Presenting Problem: ETOH induced pancreatitis, MITCH, ATN, shock    History related to current admission: Pt is 48 year Static Sitting: Fair +  Dynamic Sitting: Fair -           Static Standing: Poor +  Dynamic Standing: Poor +    ACTIVITY TOLERANCE  O2 Saturation: >91%  Room ai appropriate advancement of the RW, along with constant vc's for posture, gaze, and step length. Pt still with delayed processing of requests/commands, but improved from yesterday.      Toilet t/f with mod assist and use of grab bar and used stabilized comm mobility; Endurance; Patient education; Family education;Gait training;Strengthening;Transfer training;Balance training  Rehab Potential : Good  Frequency (Obs): 5x/week      CURRENT GOALS     Goal #1 Patient is able to demonstrate supine - sit EOB @ level: m

## 2018-08-15 NOTE — PROGRESS NOTES
[facility]  Progress Note    Luis Manuel Charles Patient Status:  Inpatient    1965 MRN [id number]   [facility] 4SW-A Attending Jayne Maldonado MD   Hosp Day # 8 PCP Viri Harris MD     Subjective:  Luis Manuel Charles is a(n) 48year old m deficits    Lab Data Review:  Recent Labs   Lab  08/13/18   0440  08/14/18   0411  08/15/18   0428   RBC  3.34*  3.39*  3.21*   HGB  10.5*  10.6*  10.0*   HCT  32.3*  31.9*  30.5*   MCV  96.7  94.1  95.0   MCH  31.4  31.3  31.2   MCHC  32.5  33.2  32.8   R pulmonary toilet, O2 weaning.   CXR in am.  Start IS, OOB if able  · Continue TPN for another 24 hours although if his diet continues to advance this will be stopped shortly  · May be appropriate for medical unit transfer later today    Sapna Jamison S

## 2018-08-15 NOTE — PLAN OF CARE
Assumed care of pt @1900; resting in bed. Pt is a little drowsy; but is orientedx3-4 once awake. Follows commands; a lot less restless but bed alarm is on. Denies nausea. Morphine given prn for pain. Febrile - tylenol given when needed. ST on monitor; VSS.

## 2018-08-15 NOTE — PROGRESS NOTES
Emiliano Washington  [facility]#:[id number]  :1965      Subjective:  Feeling much better today. Upper abdominal pain when eating. Objective:  LUQ drain site is clean, dry and intact. Vital Signs:  Blood pressure 127/76, pulse 103, temperature 99. 6

## 2018-08-15 NOTE — PROGRESS NOTES
[facility]    Gastroenterology Follow-Up Note      Luis Manuel Charles Patient Status:  Inpatient    1965 MRN [id number]   [facility] 4SW-A Attending Ned Rose MD   Hosp Day # 8 PCP Viri Harris MD     Chief Complaint/Reason f 08/15/2018   MG 2.0 08/15/2018   PHOS 2.5 08/15/2018   PGLU 195 08/15/2018         Data:    8/10/18 CT A/P personally reviewed- acute pancreatitis. No additions to radiology report. See complete report for full details.      CT 8/13/18 personally viewed- Ac

## 2018-08-15 NOTE — DIETARY NOTE
1230 [facility] ASSESSMENT    Pt does not meet malnutrition criteria.     NUTRITION DIAGNOSIS/PROBLEM:    Inadequate oral intake related to physiological causes as evidenced by need for nutrition support, diet recently advanced    NUT 08/10/18 0400 115 kg (253 lb 8.5 oz)       NUTRITION:  Diet: full  liquids  Oral Supplements: None    FOOD/NUTRITION RELATED HISTORY:  Appetite:   Intake: 0% - NPO  Intake Meeting Needs: TF to meet needs at goal   Food Allergies: No  Cultural/Ethnic/Reli

## 2018-08-15 NOTE — PROGRESS NOTES
[facility]  Progress Note     Anaid Rosenberg Patient Status:  Inpatient    1965 MRN [id number]   [facility] 3NE-A Attending Pat Schrader MD   Hosp Day # 8 PCP Ander Dowd MD     Chief Complaint: abd pain    S:  Patient m Labs   Lab  08/09/18   0235   TROP  0.118*        Imaging: Imaging data reviewed in Epic.   Medications:   • insulin detemir  9 Units Subcutaneous Daily   • pantoprazole (PROTONIX) IV push  40 mg Intravenous Q24H   • Insulin Aspart Pen  2-10 Units Subcutane

## 2018-08-15 NOTE — PLAN OF CARE
DECISION MAKING    • Pt/Family able to effectively weigh alternatives and participate in decision making related to treatment and care Progressing          Delirium    • Minimize duration of delirium Progressing          Received patient from night RN.  A/O

## 2018-08-16 NOTE — PLAN OF CARE
CARDIOVASCULAR - ADULT    • Maintains optimal cardiac output and hemodynamic stability Progressing    • Absence of cardiac arrhythmias or at baseline Progressing        Diabetes/Glucose Control    • Glucose maintained within prescribed range Progressing rest. No c/o n/v/d; tolerating diet. Abdomen distended. Bowel movement this shift. Pt reporting excessive gas. TPN infusing through IJ. Voids. Strict I&O. LUQ MANAN drain to bulb suction- minimal output this shift.  C/o 7-8/10 L abdominal pain at MANAN drain site

## 2018-08-16 NOTE — PHYSICAL THERAPY NOTE
PHYSICAL THERAPY TREATMENT NOTE - INPATIENT    Room Number: 456/456-A     Session: 3    Number of Visits to Meet Established Goals: 5    Presenting Problem: ETOH induced pancreatitis, MITCH, ATN, shock    History related to current admission: Pt is 48 year Static Sitting: Fair +  Dynamic Sitting: Fair           Static Standing: Fair  Dynamic Standing: Fair -    ACTIVITY TOLERANCE  O2 Saturation: mid 90s%  Room air  No shortness of breath  Heart Rate: 115 bpm at rest; up precautions, importance of regular mobility, negative effects of bedrest, risk for functional decline, and energy conservation techniques.             THERAPEUTIC EXERCISES  Lower Extremity Alternating march  LAQ  Heel raises  Chair push ups     Upper Extre walker - rolling at assistance level: moderate assistance with chair follow. 8/15/2018 Ach'd and upgrade to 150 ft with RW and mod indep.      Goal #4 Goals written based on recent capabilities, modify as appropriate.    Goal #5 Goal added 8/15/2018 Comp

## 2018-08-16 NOTE — PROGRESS NOTES
[facility]  Progress Note     Dorcus Grim Patient Status:  Inpatient    1965 MRN [id number]   [facility] 3NE-A Attending Kerry Gomez MD   Hosp Day # 9 PCP Linda aPrk MD     Chief Complaint: abd pain    S:  Patient r 1447   PTP  15.3*  14.9*  14.9*   INR  1.16*  1.12*  1.19*     No results for input(s): TROP, CK in the last 168 hours. Imaging: Imaging data reviewed in Epic.   Medications:   • insulin detemir  16 Units Subcutaneous Daily   • Insulin Aspart Pen  1-20 line over the weekend.      Arlinda Cooks, MD

## 2018-08-16 NOTE — PROGRESS NOTES
[facility]    Gastroenterology Follow-Up Note      Dorcus Grim Patient Status:  Inpatient    1965 MRN [id number]   [facility] 4SW-A Attending Kerry Gomez MD   Hosp Day # 9 PCP Linda Park MD     Chief Complaint/Reason f complete report for full details. CT 8/13/18 personally viewed- Acute pancreatitis. Fluid collection near gastric antrum. No additions to radiology report. CONCLUSION:    1. Compared to most recent CT of the abdomen and pelvis performed 8/7/2018.

## 2018-08-16 NOTE — PAYOR COMM NOTE
--------------  CONTINUED STAY REVIEW    Payor: [employer]  Subscriber #:  [id number]  Authorization Number: [id number]    Admit date: 8/7/18  Admit time: 2308    Admitting Physician: Nasir Green MD  Attending Physician:  Wilmer Harrell MD  Primary Ca MEDICATIONS ADMINISTERED IN LAST 1 DAY:  adult 3 in 1 tpn     Date Action Dose Route User    8/15/2018 0787 New Bag (none) Intravenous Todd Jones, RN      HYDROcodone-acetaminophen (NORCO) 5-325 MG per tab 2 tablet     Date Action Dose Route User 8/15/2018 2129 Given 2 mg Intravenous Raegan Perdomo RN    8/15/2018 1913 Given 2 mg Intravenous Eugenia Najera RN    8/15/2018 1438 Given 2 mg Intravenous Jorge Luis Gan RN      Normal Saline Flush 0.9 % injection 10 mL     Date Action Dose Route 08/13/18 2300 -- 108 21 140/86 92 % -- -- --    08/13/18 2200 -- 114 23  143/121 91 % -- -- --    08/13/18 2100 100.4 °F (38 °C) 110 19 142/80 95 % -- -- --    08/13/18 2000  102.2 °F (39 °C) 112 25 146/86 95 % -- Nasal cannula 5 L/min    08/13/18

## 2018-08-16 NOTE — PROGRESS NOTES
[facility]  Progress Note    Jennifer Gibson Patient Status:  Inpatient    1965 MRN [id number]   [facility] 4SW-A Attending Lorne Trinidad MD   Hosp Day # 9 PCP Benjamin Stokes MD     Subjective:  Jennifer Gibson is a 48year old male 95.0   MCH  31.4  31.3  31.2   MCHC  32.5  33.2  32.8   RDW  13.9  13.6  13.7   NEPRELIM  6.41  5.88  6.07   WBC  8.9  8.4  8.8   PLT  131.0*  178.0  221.0     Recent Labs   Lab  08/14/18   0411  08/15/18   0428  08/16/18   0630   GLU  207*  201*  265*   B

## 2018-08-16 NOTE — OCCUPATIONAL THERAPY NOTE
OCCUPATIONAL THERAPY TREATMENT NOTE - INPATIENT     Room Number: 389/785-E  Session: 1   Number of Visits to Meet Established Goals: 5    Presenting Problem: Alcohol induced pancreatitis, MITCH, ATN, shock, trombocytopenia    History related to current admis does the patient currently need…  -   Putting on and taking off regular lower body clothing?: A Little  -   Bathing (including washing, rinsing, drying)?: A Little  -   Toileting, which includes using toilet, bedpan or urinal? : A Little  -   Putting on an body dressing w/ supervision and with adaptive equipment PRN  Patient will perform toileting with supervision and with adaptive equipment PRN.     Functional Transfer Goals:  Patient will transfer from sit to supine:  with supervision  Patient will transfe

## 2018-08-16 NOTE — PROGRESS NOTES
[facility]  Progress Note      Xiomara Arguello Patient Status:  Inpatient    1965 MRN [id number]   [facility] 5NW-A Attending Jose Antonio Babcock MD   Hosp Day # 5 PCP Twyla Villafana MD     48year-old male with peripancreatic fluid co

## 2018-08-16 NOTE — PROGRESS NOTES
Multidisciplinary Discharge Rounds held 8/16/2018. Treatment team members present today include , , Charge Nurse,  Nurse, RT, PT and Pharmacy caring for [facility].      Other care providers present:    Patient Active Problem

## 2018-08-16 NOTE — PLAN OF CARE
ANXIETY    • Will report anxiety at manageable levels Progressing        CARDIOVASCULAR - ADULT    • Maintains optimal cardiac output and hemodynamic stability Progressing    • Absence of cardiac arrhythmias or at baseline Progressing        COPING    • Pt Patient/Family Goals    • Patient/Family Long Term Goal Progressing    • Patient/Family Short Term Goal Progressing        RESPIRATORY - ADULT    • Achieves optimal ventilation and oxygenation Progressing        RISK FOR INFECTION - ADULT    • Absence of

## 2018-08-17 NOTE — PROGRESS NOTES
Tracie Mayo  ULISES#:[id number]  :1965      Subjective:  Patient feeling much better. Up in chair. Mild pain at drain site. Objective:  LUQ drain site is clean, dry and intact.       Vital Signs:  Blood pressure 124/76, pulse 99, temperature

## 2018-08-17 NOTE — PROGRESS NOTES
5mL instilled into MANAN drain- 2.5 mL removed. Fluid serous/tan. Pt denying pain. Wctm.  Due to reflush MANAN drain at 1830

## 2018-08-17 NOTE — HOME CARE LIAISON
Referral from [place], Berta2 Praveen Minor Met with patient to discuss recommendation for physical therapy at home when discharged. Patient declined services at this time. Brochure and contact information left with patient.   Thank you for the referral.

## 2018-08-17 NOTE — PROGRESS NOTES
[facility]  Progress Note     Juju Cost Patient Status:  Inpatient    1965 MRN [id number]   [facility] 3NE-A Attending Balaji Bertrand MD   Hosp Day # 8 PCP Nya Miner MD     Chief Complaint: abd pain    S:  Doing we Subcutaneous TID CC and HS   • Ipratropium Bromide  0.5 mg Nebulization 4 times per day   • Normal Saline Flush  10 mL Intravenous Q12H   • piperacillin-tazobactam  4.5 g Intravenous Q8H     ASSESSMENT / PLAN:   1.  Sepsis due to acute  pancreatitis with po

## 2018-08-17 NOTE — PAYOR COMM NOTE
--------------  CONTINUED STAY REVIEW    Payor: [employer] [facility]  Subscriber #:  [id number]  Authorization Number: [id number]    Admit date: 8/7/18  Admit time: 2308    Admitting Physician: Dom Croft MD  Attending Physician:  Shirley Downing MD  Primary Ca 4.5 g Intravenous Isaac Gómez RN    8/17/2018 0036 New Bag 4.5 g Intravenous Bg Wilcox RN    8/16/2018 1642 New Bag 4.5 g Intravenous Bev Ivy RN      0.9%  NaCl infusion     Date Action Dose Route User    8/16/2018 7442 New Bag (none) I 08/17/2018   PGLU 166 08/17/2018            Data:     8/10/18 CT A/P personally reviewed- acute pancreatitis. No additions to radiology report. See complete report for full details.      CT 8/13/18 personally viewed- Acute pancreatitis.  Fluid collection ne

## 2018-08-17 NOTE — PHYSICAL THERAPY NOTE
PHYSICAL THERAPY TREATMENT NOTE - INPATIENT    Room Number: 495  Session: 4    Number of Visits to Meet Established Goals: 5    Presenting Problem: ETOH induced pancreatitis, MITCH, ATN, shock    History related to current admission: Pt is 48year old male Fair  Dynamic Standing: Fair -    ACTIVITY TOLERANCE  Room air    AM-PAC '6-Clicks' INPATIENT SHORT FORM - BASIC MOBILITY  How much difficulty does the patient currently have. ..  -   Turning over in bed (including adjusting bedclothes, sheets and blankets) march  Mini squats   Heel raises  Hip ext      Upper Extremity      Position Standing      Repetitions   10   Sets   1     Patient End of Session: Up in chair;Needs met;Call light within reach;RN aware of session/findings; All patient questions and concerns BSC at assistance level: moderate assistance    8/15/2018 Ach'd and upgrade to mod indep. Progressing       Goal #3 Patient is able to ambulate 10 feet with assist device: walker - rolling at assistance level: moderate assistance with chair follow.     8/1

## 2018-08-17 NOTE — CM/SW NOTE
PT recommending [facility] at DC. Dannie Maloney from [facility] met with pt and pt declined [facility] services. / to remain available for support and/or discharge planning.

## 2018-08-17 NOTE — PLAN OF CARE
CARDIOVASCULAR - ADULT    • Maintains optimal cardiac output and hemodynamic stability Progressing    • Absence of cardiac arrhythmias or at baseline Progressing        Diabetes/Glucose Control    • Glucose maintained within prescribed range Progressing room air, 2L nasal cannula PRN, , nebs, encourage IS. Pt reports bm earlier in day, voids, BRP, up with assist, OT rec's HHPT/OT, fall precautions. Pt with right arm precautions- midline. Pt with right IJ- dressing c/d/i, infusing TKO, IV abx.  Pt with J

## 2018-08-17 NOTE — PROGRESS NOTES
[facility]    Gastroenterology Follow-Up Note      Alan Rose Patient Status:  Inpatient    1965 MRN [id number]   [facility] 4SW-A Attending Olga Marin MD   [facility] Day # 10 PCP Enzo Castillo MD     Chief Complaint/Reason personally reviewed- acute pancreatitis. No additions to radiology report. See complete report for full details. CT 8/13/18 personally viewed- Acute pancreatitis. Fluid collection near gastric antrum. No additions to radiology report.      CONCLUSION:

## 2018-08-18 NOTE — PROGRESS NOTES
Jorge Villaltaestevan PERERA#:[id number]  :1965      Subjective:  No complaint. Objective:  LUQ drain site is clean, dry and intact. Catheter was removed at bedside intoto.       Vital Signs:  Blood pressure 135/76, pulse 92, temperature 98.4 °F (36.9

## 2018-08-18 NOTE — PROGRESS NOTES
[facility]  Progress Note     Worthy [facility] Patient Status:  Inpatient    1965 MRN [id number]   [facility] 3NE-A Attending Joanna Alanis MD   Hosp Day # 6 PCP Aurelia Chauhan MD     Chief Complaint: abd pain    S:  Doing we Normal Saline Flush  10 mL Intravenous Q12H   • piperacillin-tazobactam  4.5 g Intravenous Q8H     ASSESSMENT / PLAN:   1. Sepsis due to acute  pancreatitis with possible infection versus necrosis   1. drain placed by IR on 8/13   2.  NGTD on abdominal flui

## 2018-08-18 NOTE — PROGRESS NOTES
[facility]    Gastroenterology Follow-Up Note      Anaid Rosenberg Patient Status:  Inpatient    1965 MRN [id number]   [facility] 4SW-A Attending Will Schafer MD   Hosp Day # 6 PCP Ander Dowd MD     Chief Complaint/Reason near gastric antrum. No additions to radiology report. CONCLUSION:    1. Compared to most recent CT of the abdomen and pelvis performed 8/7/2018. The pancreas is diffusely enlarged with increasing peripancreatic inflammatory changes and fluid.   Cedric Horton

## 2018-08-19 NOTE — PROGRESS NOTES
[facility]  Progress Note     Johnna Rodriguez Patient Status:  Inpatient    1965 MRN [id number]   [facility] 3NE-A Attending Ivan Rodriguez MD   Hosp Day # 15 PCP Mao Bustamante MD     Chief Complaint: abd pain    S:  Reports piperacillin-tazobactam  4.5 g Intravenous Q8H     ASSESSMENT / PLAN:   1. Sepsis due to acute  pancreatitis with possible infection versus necrosis   1. drain placed by IR on 8/13   2. NGTD on abdominal fluid  3. Day 11 Zosyn  4. norco prn  5.  Tolerating

## 2018-08-19 NOTE — PROGRESS NOTES
NURSING DISCHARGE NOTE    Discharged Home via Wheelchair. Accompanied by Family member and Support staff  Belongings Taken by patient/family. Patient stable upon discharge. IV removed.  Discharge instructions and teach back on diabetes education c

## 2018-08-19 NOTE — PROGRESS NOTES
[facility]    Gastroenterology Follow-Up Note      Juju Cost Patient Status:  Inpatient    1965 MRN [id number]   [facility] 4SW-A Attending Balaji Bertrand MD   Hosp Day # 15 PCP Nya Miner MD     Chief Complaint/Reason details. CT 8/13/18 personally viewed- Acute pancreatitis. Fluid collection near gastric antrum. No additions to radiology report. CONCLUSION:    1. Compared to most recent CT of the abdomen and pelvis performed 8/7/2018.   The pancreas is diffusely

## 2018-08-19 NOTE — PLAN OF CARE
ANXIETY    • Will report anxiety at manageable levels Completed        CARDIOVASCULAR - ADULT    • Maintains optimal cardiac output and hemodynamic stability Completed    • Absence of cardiac arrhythmias or at baseline Completed        COPING    • Pt/Famil Term Goal Completed    • Patient/Family Short Term Goal Completed        RESPIRATORY - ADULT    • Achieves optimal ventilation and oxygenation Completed        RISK FOR INFECTION - ADULT    • Absence of fever/infection during anticipated neutropenic period

## 2018-08-20 ENCOUNTER — PATIENT OUTREACH (OUTPATIENT)
Dept: CASE MANAGEMENT | Age: 53
End: 2018-08-20

## 2018-08-20 DIAGNOSIS — K85.20 ALCOHOL-INDUCED ACUTE PANCREATITIS, UNSPECIFIED COMPLICATION STATUS: ICD-10-CM

## 2018-08-20 NOTE — CM/SW NOTE
08/20/18 0900   Discharge disposition   Expected discharge disposition Home or Self   Home services after discharge Patient refused services   Discharge transportation Private car     Patient discharged on 08/19/2018 as previously planned.

## 2018-08-22 ENCOUNTER — OFFICE VISIT (OUTPATIENT)
Dept: INTERNAL MEDICINE CLINIC | Facility: CLINIC | Age: 53
End: 2018-08-22
Payer: COMMERCIAL

## 2018-08-22 VITALS
DIASTOLIC BLOOD PRESSURE: 78 MMHG | SYSTOLIC BLOOD PRESSURE: 108 MMHG | RESPIRATION RATE: 16 BRPM | TEMPERATURE: 98 F | HEIGHT: 72 IN | BODY MASS INDEX: 29.8 KG/M2 | HEART RATE: 100 BPM | WEIGHT: 220 LBS

## 2018-08-22 DIAGNOSIS — D69.6 THROMBOCYTOPENIA (HCC): ICD-10-CM

## 2018-08-22 DIAGNOSIS — N17.9 AKI (ACUTE KIDNEY INJURY) (HCC): ICD-10-CM

## 2018-08-22 DIAGNOSIS — IMO0001 UNCONTROLLED TYPE 2 DIABETES MELLITUS WITHOUT COMPLICATION, WITH LONG-TERM CURRENT USE OF INSULIN: Primary | ICD-10-CM

## 2018-08-22 DIAGNOSIS — K85.20 ALCOHOL-INDUCED ACUTE PANCREATITIS, UNSPECIFIED COMPLICATION STATUS: ICD-10-CM

## 2018-08-22 DIAGNOSIS — F10.239 ALCOHOL WITHDRAWAL SYNDROME WITH COMPLICATION (HCC): ICD-10-CM

## 2018-08-22 DIAGNOSIS — R73.9 HYPERGLYCEMIA: ICD-10-CM

## 2018-08-22 PROCEDURE — 99495 TRANSJ CARE MGMT MOD F2F 14D: CPT | Performed by: INTERNAL MEDICINE

## 2018-08-22 NOTE — PATIENT INSTRUCTIONS
- Increase detemir (once daily insulin) to 20 units daily  - Follow up with Nataly Duncan (diabetes educator). She is here in this clinic in Wednesdays, in [place] the rest of the week.   - If you do not see her within two weeks, send me your glucose re

## 2018-08-22 NOTE — PROGRESS NOTES
HPI:    Jbe Bautista is a 48year old male here today for hospital follow up.    He was discharged from Inpatient hospital, [facility] to Home   Admit Date: 8/7/18  Discharge Date: 8/19/18  Hospital Discharge Diagnosis: sepsis, pancreatitis with fluid hyperglycemia. He had a drain placed by IR for pancreatic fluid drainage. This drain was removed before discharge. Patient had issues with hyperglycemia throughout hospital stay. He was started on insulin therapy. A1c was in prediabetes range.   He als UNIT/ML Subcutaneous Solution Pen-injector Inject 18 units into the skin once daily in the morning, further refills of insulin per PCP DR. Anderson   Insulin Aspart Pen (NOVOLOG FLEXPEN) 100 UNIT/ML Subcutaneous Solution Pen-injector MEDIUM DOSEInject 2 uni 98.2 °F (36.8 °C) (Oral)   Resp 16   Ht 72\"   Wt 220 lb   BMI 29.84 kg/m²    GENERAL: well developed, well nourished, in no apparent distress  SKIN: no rashes, no suspicious lesions. Well healed pancreatic drain scars.   HEENT: atraumatic, normocephalic, pancreatitis will likely need insulin therapy for now. He is interested in diabetes education - referral entered. For now, will increase Levemir from 18 to 20 units qd.   Advised to call with glucose readings if he does not see diabetes educator within 2

## 2018-08-24 PROBLEM — R73.9 HYPERGLYCEMIA: Status: ACTIVE | Noted: 2018-08-24

## 2018-08-27 NOTE — PROGRESS NOTES
Multiple attempts to reach the pt and messages left with no returned phone call. Pt went to [place] with PCP's office on  8/22/18. Closing encounter.

## 2018-08-28 ENCOUNTER — NURSE ONLY (OUTPATIENT)
Dept: ENDOCRINOLOGY CLINIC | Facility: CLINIC | Age: 53
End: 2018-08-28
Payer: COMMERCIAL

## 2018-08-28 VITALS
BODY MASS INDEX: 29.12 KG/M2 | HEART RATE: 103 BPM | DIASTOLIC BLOOD PRESSURE: 78 MMHG | WEIGHT: 215 LBS | HEIGHT: 72 IN | SYSTOLIC BLOOD PRESSURE: 117 MMHG

## 2018-08-28 DIAGNOSIS — R73.9 HYPERGLYCEMIA: Primary | ICD-10-CM

## 2018-08-28 PROCEDURE — G0108 DIAB MANAGE TRN  PER INDIV: HCPCS

## 2018-08-28 NOTE — PROGRESS NOTES
Soni Diaz  : 1965 attended Step 1 Diabetic Education:    Date: 2018   Start time:3:30p  End time: 4:30pm    /78   Pulse 103   Ht 72\"   Wt 215 lb   BMI 29.16 kg/m²       HgbA1C (%)   Date Value   2018 6.4 (H)   --------

## 2018-09-06 ENCOUNTER — NURSE ONLY (OUTPATIENT)
Dept: ENDOCRINOLOGY CLINIC | Facility: CLINIC | Age: 53
End: 2018-09-06
Payer: COMMERCIAL

## 2018-09-06 DIAGNOSIS — R73.9 HYPERGLYCEMIA: Primary | ICD-10-CM

## 2018-09-06 PROCEDURE — G0109 DIAB MANAGE TRN IND/GROUP: HCPCS

## 2018-09-06 NOTE — PROGRESS NOTES
Kassandra Isbell  DOB6/1/1965 attended Step 2 Diabetic Education:    Date: 9/6/2018  Start time: 9am End time: 11am      Diabetes Overview, pathophysiology, pre-diabetes, A1C results and treatment options for diabetes self-management, types of diabet

## 2018-09-12 PROBLEM — D64.9 NORMOCYTIC ANEMIA: Status: ACTIVE | Noted: 2018-09-12

## 2018-09-12 PROBLEM — Z79.4 TYPE 2 DIABETES MELLITUS WITHOUT COMPLICATION, WITH LONG-TERM CURRENT USE OF INSULIN (HCC): Status: ACTIVE | Noted: 2018-08-24

## 2018-09-12 PROBLEM — E11.9 TYPE 2 DIABETES MELLITUS WITHOUT COMPLICATION, WITH LONG-TERM CURRENT USE OF INSULIN (HCC): Status: ACTIVE | Noted: 2018-08-24

## 2018-09-12 PROBLEM — K85.20 ALCOHOL-INDUCED ACUTE PANCREATITIS, UNSPECIFIED COMPLICATION STATUS: Status: RESOLVED | Noted: 2018-08-07 | Resolved: 2018-09-12

## 2018-09-12 PROBLEM — K85.20 ALCOHOL-INDUCED ACUTE PANCREATITIS, UNSPECIFIED COMPLICATION STATUS (HCC): Status: RESOLVED | Noted: 2018-08-07 | Resolved: 2018-09-12

## 2018-09-12 NOTE — PATIENT INSTRUCTIONS
- Continue insulin at 22 units daily, as well as the sliding scale  - When you are getting the colonoscopy and endoscopy, and cannot eat - cut morning insulin dose in half (11 units), and do not take the sliding scale insulin.   - Follow up with me in 3 mon

## 2018-09-18 ENCOUNTER — DIABETIC EDUCATION (OUTPATIENT)
Dept: ENDOCRINOLOGY CLINIC | Facility: CLINIC | Age: 53
End: 2018-09-18
Payer: COMMERCIAL

## 2018-09-18 DIAGNOSIS — E11.9 TYPE 2 DIABETES MELLITUS WITHOUT COMPLICATION, WITH LONG-TERM CURRENT USE OF INSULIN (HCC): Primary | ICD-10-CM

## 2018-09-18 DIAGNOSIS — Z79.4 TYPE 2 DIABETES MELLITUS WITHOUT COMPLICATION, WITH LONG-TERM CURRENT USE OF INSULIN (HCC): Primary | ICD-10-CM

## 2018-09-18 PROCEDURE — G0109 DIAB MANAGE TRN IND/GROUP: HCPCS | Performed by: DIETITIAN, REGISTERED

## 2018-09-19 NOTE — PROGRESS NOTES
Rosaura BLACKMON 1965 attended Step 3 Diabetic Education:    Date: 2018  Start time: 6:00 End time: 8:00    Prevention, detection and treatment of chronic complications  Reviewed how to reduce risks of complications including eye, foot,

## 2018-10-05 ENCOUNTER — TELEPHONE (OUTPATIENT)
Dept: INTERNAL MEDICINE CLINIC | Facility: CLINIC | Age: 53
End: 2018-10-05

## 2018-10-05 NOTE — TELEPHONE ENCOUNTER
Patient walked in today to state that he needs a note for work from 9/2/18-9/15/18 stating the reason he wasn't able to be at work. Dr. Dolly Garcia is aware and will do it next week for pt.

## 2018-10-08 NOTE — TELEPHONE ENCOUNTER
Note written, entered in EMR. Note and FMLA contact information where note needs to be faxed are in my in box (top box).

## 2018-10-19 ENCOUNTER — NURSE ONLY (OUTPATIENT)
Dept: ENDOCRINOLOGY CLINIC | Facility: CLINIC | Age: 53
End: 2018-10-19
Payer: COMMERCIAL

## 2018-10-19 VITALS
SYSTOLIC BLOOD PRESSURE: 121 MMHG | BODY MASS INDEX: 29.53 KG/M2 | DIASTOLIC BLOOD PRESSURE: 78 MMHG | WEIGHT: 218 LBS | HEIGHT: 72 IN

## 2018-10-19 DIAGNOSIS — Z79.4 TYPE 2 DIABETES MELLITUS WITHOUT COMPLICATION, WITH LONG-TERM CURRENT USE OF INSULIN (HCC): Primary | ICD-10-CM

## 2018-10-19 DIAGNOSIS — E11.9 TYPE 2 DIABETES MELLITUS WITHOUT COMPLICATION, WITH LONG-TERM CURRENT USE OF INSULIN (HCC): Primary | ICD-10-CM

## 2018-10-19 PROCEDURE — G0108 DIAB MANAGE TRN  PER INDIV: HCPCS

## 2018-10-19 NOTE — PROGRESS NOTES
Archie Ko  :1965 attended Step 4 Diabetic Education:    Date: 10/19/2018  Start time: 9am End time: 9:30am      /78   Ht 72\"   Wt 218 lb   BMI 29.57 kg/m²   Weight Change:  No Weight change noted.     HgbA1C (%)   Date Value   2018 6 diabetic educator annually. Participate in workplace wellness program if offered through employer, utilize a health   if offered. Use diabetes apps and websites such as:   Glucose - Blood glucose and weight tracker.  Has weight, reminde

## 2018-11-30 ENCOUNTER — NURSE ONLY (OUTPATIENT)
Dept: ENDOCRINOLOGY CLINIC | Facility: CLINIC | Age: 53
End: 2018-11-30
Payer: COMMERCIAL

## 2018-11-30 VITALS
SYSTOLIC BLOOD PRESSURE: 117 MMHG | WEIGHT: 218 LBS | BODY MASS INDEX: 29.53 KG/M2 | DIASTOLIC BLOOD PRESSURE: 76 MMHG | HEIGHT: 72 IN

## 2018-11-30 DIAGNOSIS — E11.9 TYPE 2 DIABETES MELLITUS WITHOUT COMPLICATION, WITH LONG-TERM CURRENT USE OF INSULIN (HCC): Primary | ICD-10-CM

## 2018-11-30 DIAGNOSIS — Z79.4 TYPE 2 DIABETES MELLITUS WITHOUT COMPLICATION, WITH LONG-TERM CURRENT USE OF INSULIN (HCC): Primary | ICD-10-CM

## 2018-11-30 PROCEDURE — G0108 DIAB MANAGE TRN  PER INDIV: HCPCS

## 2018-11-30 PROCEDURE — 83036 HEMOGLOBIN GLYCOSYLATED A1C: CPT

## 2018-11-30 NOTE — PROGRESS NOTES
Everett Bowie  :1965 attended Step 4 Diabetic Education:    Date: 2018  Start time: 8 ;30am End time: 9am      /76   Ht 72\"   Wt 218 lb   BMI 29.57 kg/m²   Weight Change:  0    HEMOGLOBIN A1C (%)   Date Value   2018 6.0 ( Glucose Jose F   InSync-- Yoni that enables you to email a blood glucose log For iOS users. LoseIt!-- Look up foods, for weight loss. Tracks calories, has weight loss goal. Yoni and  on line.    National Diabetes Education Program website: ndep.nih.gov   [place]

## 2018-12-14 ENCOUNTER — LAB ENCOUNTER (OUTPATIENT)
Dept: LAB | Age: 53
End: 2018-12-14
Attending: INTERNAL MEDICINE
Payer: COMMERCIAL

## 2018-12-14 ENCOUNTER — OFFICE VISIT (OUTPATIENT)
Dept: INTERNAL MEDICINE CLINIC | Facility: CLINIC | Age: 53
End: 2018-12-14
Payer: COMMERCIAL

## 2018-12-14 VITALS
HEIGHT: 71.75 IN | RESPIRATION RATE: 18 BRPM | WEIGHT: 221.5 LBS | BODY MASS INDEX: 30.33 KG/M2 | TEMPERATURE: 99 F | DIASTOLIC BLOOD PRESSURE: 60 MMHG | SYSTOLIC BLOOD PRESSURE: 102 MMHG | HEART RATE: 60 BPM

## 2018-12-14 DIAGNOSIS — D64.9 NORMOCYTIC ANEMIA: ICD-10-CM

## 2018-12-14 DIAGNOSIS — Z79.4 TYPE 2 DIABETES MELLITUS WITHOUT COMPLICATION, WITH LONG-TERM CURRENT USE OF INSULIN (HCC): Primary | ICD-10-CM

## 2018-12-14 DIAGNOSIS — E11.9 TYPE 2 DIABETES MELLITUS WITHOUT COMPLICATION, WITH LONG-TERM CURRENT USE OF INSULIN (HCC): Primary | ICD-10-CM

## 2018-12-14 DIAGNOSIS — E11.9 TYPE 2 DIABETES MELLITUS WITHOUT COMPLICATION, WITH LONG-TERM CURRENT USE OF INSULIN (HCC): ICD-10-CM

## 2018-12-14 DIAGNOSIS — R74.8 ELEVATED LIVER ENZYMES: ICD-10-CM

## 2018-12-14 DIAGNOSIS — Z23 NEED FOR INFLUENZA VACCINATION: ICD-10-CM

## 2018-12-14 DIAGNOSIS — D69.6 THROMBOCYTOPENIA (HCC): ICD-10-CM

## 2018-12-14 DIAGNOSIS — D50.0 IRON DEFICIENCY ANEMIA DUE TO CHRONIC BLOOD LOSS: ICD-10-CM

## 2018-12-14 DIAGNOSIS — Z79.4 TYPE 2 DIABETES MELLITUS WITHOUT COMPLICATION, WITH LONG-TERM CURRENT USE OF INSULIN (HCC): ICD-10-CM

## 2018-12-14 PROCEDURE — 99214 OFFICE O/P EST MOD 30 MIN: CPT | Performed by: INTERNAL MEDICINE

## 2018-12-14 PROCEDURE — 82570 ASSAY OF URINE CREATININE: CPT | Performed by: INTERNAL MEDICINE

## 2018-12-14 PROCEDURE — 90471 IMMUNIZATION ADMIN: CPT | Performed by: INTERNAL MEDICINE

## 2018-12-14 PROCEDURE — 82043 UR ALBUMIN QUANTITATIVE: CPT | Performed by: INTERNAL MEDICINE

## 2018-12-14 PROCEDURE — 90686 IIV4 VACC NO PRSV 0.5 ML IM: CPT | Performed by: INTERNAL MEDICINE

## 2018-12-14 NOTE — PROGRESS NOTES
Bryce Mckeon is a 48year old male. HPI:   Patient presents with: Follow - Up: 3 month follow  up  Patient presents for follow up on chronic medical issues. Diabetes - under excellent control. Following with diabetes education.     Thrombocyt UNIT/ML Subcutaneous Solution Pen-injector, MEDIUM DOSE Inject 2 unit if blood glucose is between 141-180 mg/dL  Inject 3 units if blood glucose is between 181-220 mg/dL Inject 6 units if blood glucose is between 221-260 mg/dL  Inject 8 units if blood gluc pleasant, appropriate mood and affect  ASSESSMENT AND PLAN:   1. Type 2 diabetes mellitus without complication, with long-term current use of insulin (HCC)  Excellent control. A1c of 6.0 last month. Will decrease Lantus to 15 units qd.   He still likely w

## 2018-12-14 NOTE — PATIENT INSTRUCTIONS
- Get blood work from Pratibha Inman/Dr. Sundar Bruce today.   - We will also do the urine diabetes test  - Decrease insulin dose to 15 units  - Get eye exam done with Dr. Sabino Chan  - On day before colonoscopy and day of colonoscopy, take 1/2 dose of ins [address] [facility], [address] [facility]. Todos los derechos reservados. Esta información no pretende sustituir la atención médica profesional. Sólo packer médico puede diagnosticar y tratar un problema de danika.     It was a pleasure se

## 2018-12-21 PROBLEM — K64.0 FIRST DEGREE HEMORRHOIDS: Status: ACTIVE | Noted: 2018-12-21

## 2018-12-21 PROBLEM — K57.30 DIVERTICULOSIS OF LARGE INTESTINE WITHOUT PERFORATION OR ABSCESS WITHOUT BLEEDING: Status: ACTIVE | Noted: 2018-12-21

## 2018-12-21 PROBLEM — D50.9 IRON DEFICIENCY ANEMIA, UNSPECIFIED: Status: ACTIVE | Noted: 2018-12-21

## 2019-05-10 RX ORDER — PEN NEEDLE, DIABETIC 32GX 5/32"
NEEDLE, DISPOSABLE MISCELLANEOUS
Refills: 6 | COMMUNITY
Start: 2019-03-25 | End: 2019-05-10

## 2019-05-10 RX ORDER — PEN NEEDLE, DIABETIC 32GX 5/32"
NEEDLE, DISPOSABLE MISCELLANEOUS
Qty: 50 EACH | Refills: 5 | Status: SHIPPED | OUTPATIENT
Start: 2019-05-10 | End: 2020-02-28

## 2019-05-10 NOTE — TELEPHONE ENCOUNTER
BD pen needle Ginette 32Gx4 filled 8-19-18 50 each with 6 refills     LOV 12-14-18   return to clinic in 6 months   Next apt 6-14-19   Labs 11-30-18

## 2019-06-14 ENCOUNTER — LAB ENCOUNTER (OUTPATIENT)
Dept: LAB | Age: 54
End: 2019-06-14
Attending: INTERNAL MEDICINE
Payer: COMMERCIAL

## 2019-06-14 ENCOUNTER — OFFICE VISIT (OUTPATIENT)
Dept: INTERNAL MEDICINE CLINIC | Facility: CLINIC | Age: 54
End: 2019-06-14
Payer: COMMERCIAL

## 2019-06-14 VITALS
RESPIRATION RATE: 18 BRPM | WEIGHT: 243 LBS | HEIGHT: 72 IN | TEMPERATURE: 98 F | DIASTOLIC BLOOD PRESSURE: 68 MMHG | HEART RATE: 76 BPM | SYSTOLIC BLOOD PRESSURE: 106 MMHG | BODY MASS INDEX: 32.91 KG/M2

## 2019-06-14 DIAGNOSIS — D64.9 NORMOCYTIC ANEMIA: Primary | ICD-10-CM

## 2019-06-14 DIAGNOSIS — R53.83 FATIGUE, UNSPECIFIED TYPE: ICD-10-CM

## 2019-06-14 DIAGNOSIS — M79.671 PAIN OF RIGHT HEEL: ICD-10-CM

## 2019-06-14 DIAGNOSIS — Z00.00 PREVENTATIVE HEALTH CARE: ICD-10-CM

## 2019-06-14 DIAGNOSIS — E55.9 VITAMIN D DEFICIENCY: ICD-10-CM

## 2019-06-14 DIAGNOSIS — E11.9 CONTROLLED TYPE 2 DIABETES MELLITUS WITHOUT COMPLICATION, WITHOUT LONG-TERM CURRENT USE OF INSULIN (HCC): ICD-10-CM

## 2019-06-14 DIAGNOSIS — D50.9 IRON DEFICIENCY ANEMIA, UNSPECIFIED IRON DEFICIENCY ANEMIA TYPE: ICD-10-CM

## 2019-06-14 DIAGNOSIS — D69.6 THROMBOCYTOPENIA (HCC): ICD-10-CM

## 2019-06-14 DIAGNOSIS — L98.9 LESION OF NECK: ICD-10-CM

## 2019-06-14 DIAGNOSIS — E78.2 MIXED HYPERLIPIDEMIA: Chronic | ICD-10-CM

## 2019-06-14 DIAGNOSIS — D64.9 NORMOCYTIC ANEMIA: ICD-10-CM

## 2019-06-14 DIAGNOSIS — R06.83 SNORING: ICD-10-CM

## 2019-06-14 DIAGNOSIS — G47.10 HYPERSOMNIA: ICD-10-CM

## 2019-06-14 PROCEDURE — 99214 OFFICE O/P EST MOD 30 MIN: CPT | Performed by: INTERNAL MEDICINE

## 2019-06-14 PROCEDURE — 80061 LIPID PANEL: CPT | Performed by: INTERNAL MEDICINE

## 2019-06-14 PROCEDURE — 84153 ASSAY OF PSA TOTAL: CPT | Performed by: INTERNAL MEDICINE

## 2019-06-14 PROCEDURE — 82306 VITAMIN D 25 HYDROXY: CPT | Performed by: INTERNAL MEDICINE

## 2019-06-14 PROCEDURE — 80050 GENERAL HEALTH PANEL: CPT | Performed by: INTERNAL MEDICINE

## 2019-06-14 PROCEDURE — 83036 HEMOGLOBIN GLYCOSYLATED A1C: CPT | Performed by: INTERNAL MEDICINE

## 2019-06-14 PROCEDURE — 36415 COLL VENOUS BLD VENIPUNCTURE: CPT | Performed by: INTERNAL MEDICINE

## 2019-06-14 RX ORDER — MELOXICAM 7.5 MG/1
7.5 TABLET ORAL DAILY
Qty: 30 TABLET | Refills: 0 | Status: SHIPPED | OUTPATIENT
Start: 2019-06-14 | End: 2019-07-14

## 2019-06-14 RX ORDER — MELOXICAM 7.5 MG/1
7.5 TABLET ORAL DAILY
Qty: 30 TABLET | Refills: 0 | Status: SHIPPED | OUTPATIENT
Start: 2019-06-14 | End: 2019-06-14

## 2019-06-14 NOTE — PROGRESS NOTES
Sean Barron is a 48year old male. HPI:   Patient presents with: Follow - Up: pancreatitis follow up appt. Foot Pain: right toe  Fatigue    Patient presents to discuss several issues.   Patient presents with an acute musculoskeletal complaint PCP DR. Anderson (Patient taking differently: Inject 18 Units into the skin every morning.  ), Disp: 5 Device, Rfl: 3  •  Insulin Aspart Pen (NOVOLOG FLEXPEN) 100 UNIT/ML Subcutaneous Solution Pen-injector, MEDIUM DOSE Inject 2 unit if blood glucose is betw mood and affect  ASSESSMENT AND PLAN:   1. Normocytic anemia  2. Iron deficiency anemia, unspecified iron deficiency anemia type  3. Thrombocytopenia ([facility] [address]  Will check updated CBC.  - CBC WITH DIFFERENTIAL WITH PLATELET; Future    4.  Controlled type 2 diab

## 2019-06-14 NOTE — PATIENT INSTRUCTIONS
- Get blood work done today  - Based on results we may have you schedule a sleep study  - Schedule ultrasound of your neck ([phone]). - For foot pain, start daily prescription anti-inflammatory (meloxicam). Take 1 tablet daily for 2 weeks.   - Do abraham

## 2019-06-16 DIAGNOSIS — E78.2 MIXED HYPERLIPIDEMIA: ICD-10-CM

## 2019-06-16 DIAGNOSIS — E55.9 VITAMIN D DEFICIENCY: Primary | ICD-10-CM

## 2019-06-16 PROBLEM — E11.9 TYPE 2 DIABETES MELLITUS WITHOUT COMPLICATION, WITH LONG-TERM CURRENT USE OF INSULIN (HCC): Chronic | Status: ACTIVE | Noted: 2018-08-24

## 2019-06-16 PROBLEM — D50.9 IRON DEFICIENCY ANEMIA, UNSPECIFIED: Status: RESOLVED | Noted: 2018-12-21 | Resolved: 2019-06-16

## 2019-06-16 PROBLEM — Z79.4 TYPE 2 DIABETES MELLITUS WITHOUT COMPLICATION, WITH LONG-TERM CURRENT USE OF INSULIN (HCC): Chronic | Status: ACTIVE | Noted: 2018-08-24

## 2019-06-16 PROBLEM — D64.9 NORMOCYTIC ANEMIA: Status: RESOLVED | Noted: 2018-09-12 | Resolved: 2019-06-16

## 2019-06-16 PROBLEM — L98.9 LESION OF NECK: Status: ACTIVE | Noted: 2019-06-16

## 2019-06-16 PROBLEM — M79.671 PAIN OF RIGHT HEEL: Status: ACTIVE | Noted: 2019-06-16

## 2019-06-16 RX ORDER — ERGOCALCIFEROL 1.25 MG/1
50000 CAPSULE ORAL WEEKLY
Qty: 12 CAPSULE | Refills: 0 | Status: SHIPPED | OUTPATIENT
Start: 2019-06-16 | End: 2019-06-16

## 2019-06-16 RX ORDER — ERGOCALCIFEROL 1.25 MG/1
50000 CAPSULE ORAL WEEKLY
Qty: 12 CAPSULE | Refills: 0 | Status: SHIPPED | OUTPATIENT
Start: 2019-06-16 | End: 2019-09-14

## 2019-06-16 RX ORDER — ATORVASTATIN CALCIUM 10 MG/1
10 TABLET, FILM COATED ORAL NIGHTLY
Qty: 90 TABLET | Refills: 1 | Status: SHIPPED | OUTPATIENT
Start: 2019-06-16 | End: 2019-12-18

## 2019-06-16 RX ORDER — ATORVASTATIN CALCIUM 10 MG/1
10 TABLET, FILM COATED ORAL NIGHTLY
Qty: 90 TABLET | Refills: 1 | Status: SHIPPED | OUTPATIENT
Start: 2019-06-16 | End: 2019-06-16

## 2019-06-27 DIAGNOSIS — E11.9 TYPE 2 DIABETES MELLITUS WITHOUT COMPLICATION, WITH LONG-TERM CURRENT USE OF INSULIN (HCC): Primary | Chronic | ICD-10-CM

## 2019-06-27 DIAGNOSIS — Z79.4 TYPE 2 DIABETES MELLITUS WITHOUT COMPLICATION, WITH LONG-TERM CURRENT USE OF INSULIN (HCC): Primary | Chronic | ICD-10-CM

## 2019-06-27 NOTE — TELEPHONE ENCOUNTER
Patient calling in, requesting a refill for insulin detemir (LEVEMIR) 100 UNIT/ML flextouch 18 Units  BD PEN NEEDLE SHA U/F 32G X 4 MM Does not apply Misc    [facility]  [facility]

## 2019-06-27 NOTE — TELEPHONE ENCOUNTER
Failed protocol     Last refill:  8/19/2018 5 Device 3R     HA1C - 6/14/2019   Notes recorded by Roberta Iyer MD on 6/16/2019 at 7:51 PM CDT  Worsening diabetes, will increase Lantus dose to 20 units qhs.  Lipids high, will start on statin.  Vitamin D

## 2019-07-14 DIAGNOSIS — M79.671 ACUTE PAIN OF RIGHT FOOT: Primary | ICD-10-CM

## 2019-07-15 RX ORDER — MELOXICAM 7.5 MG/1
TABLET ORAL
Qty: 30 TABLET | Refills: 0 | Status: SHIPPED | OUTPATIENT
Start: 2019-07-15 | End: 2019-08-10

## 2019-07-15 NOTE — TELEPHONE ENCOUNTER
Meloxicam 7.5 mg oral tab    Last OV relevant to medication: 6/14/2019    Last refill date: 06/14/2019     #/refills: #30 w/ 0 refills     When pt was asked to return for OV: None noted     Upcoming appt/reason: No future appointments

## 2019-08-02 ENCOUNTER — TELEPHONE (OUTPATIENT)
Dept: INTERNAL MEDICINE CLINIC | Facility: CLINIC | Age: 54
End: 2019-08-02

## 2019-08-02 NOTE — TELEPHONE ENCOUNTER
Patient calling in, stated he checked his sugar and it is 311. Pt seeking an appointment today. Please call pt to discuss symptoms.

## 2019-08-02 NOTE — TELEPHONE ENCOUNTER
Rec increasing Levemir to 24 units daily and cont to check AM FBS. Pt to call or send Donde message with his readings to Dr. Jennifer Holguin in one week.

## 2019-08-02 NOTE — TELEPHONE ENCOUNTER
Called pt back and pt stated to be asymptomatic. Pt informed fasting BG from this morning was at 311. Pt placed long acting insulin and had cereal & a small chicken sandwich for breakfast.     Pt stated does 20u qd of long acting insulin every morning.

## 2019-08-09 ENCOUNTER — HOSPITAL ENCOUNTER (OUTPATIENT)
Dept: GENERAL RADIOLOGY | Age: 54
Discharge: HOME | End: 2019-08-09
Attending: INTERNAL MEDICINE
Payer: COMMERCIAL

## 2019-08-09 ENCOUNTER — OFFICE VISIT (OUTPATIENT)
Dept: INTERNAL MEDICINE CLINIC | Facility: CLINIC | Age: 54
End: 2019-08-09
Payer: COMMERCIAL

## 2019-08-09 VITALS
HEART RATE: 82 BPM | HEIGHT: 72 IN | RESPIRATION RATE: 16 BRPM | TEMPERATURE: 98 F | BODY MASS INDEX: 32.88 KG/M2 | DIASTOLIC BLOOD PRESSURE: 84 MMHG | SYSTOLIC BLOOD PRESSURE: 120 MMHG | WEIGHT: 242.75 LBS | OXYGEN SATURATION: 97 %

## 2019-08-09 DIAGNOSIS — E78.2 MIXED HYPERLIPIDEMIA: Chronic | ICD-10-CM

## 2019-08-09 DIAGNOSIS — Z79.4 TYPE 2 DIABETES MELLITUS WITHOUT COMPLICATION, WITH LONG-TERM CURRENT USE OF INSULIN (HCC): Primary | Chronic | ICD-10-CM

## 2019-08-09 DIAGNOSIS — M79.671 RIGHT FOOT PAIN: ICD-10-CM

## 2019-08-09 DIAGNOSIS — E11.9 TYPE 2 DIABETES MELLITUS WITHOUT COMPLICATION, WITH LONG-TERM CURRENT USE OF INSULIN (HCC): Primary | Chronic | ICD-10-CM

## 2019-08-09 DIAGNOSIS — E55.9 VITAMIN D DEFICIENCY: Chronic | ICD-10-CM

## 2019-08-09 PROCEDURE — 99214 OFFICE O/P EST MOD 30 MIN: CPT | Performed by: INTERNAL MEDICINE

## 2019-08-09 PROCEDURE — 73630 X-RAY EXAM OF FOOT: CPT | Performed by: INTERNAL MEDICINE

## 2019-08-09 RX ORDER — CLINDAMYCIN PHOSPHATE 10 MG/ML
LOTION TOPICAL
COMMUNITY
End: 2019-09-27

## 2019-08-09 NOTE — PATIENT INSTRUCTIONS
- Continue at 30 units daily for 2 weeks. - Send me an update with the numbers on [facility]  - We will decide if we need you to go the diabetes clinic based on the numbers.  - We will check an x-ray of your right foot today as well.     It was a pleasure see

## 2019-08-09 NOTE — PROGRESS NOTES
America Diehl is a 48year old male. HPI:   Patient presents with:  Diabetes    Patient presents for follow up on chronic issues. Diabetes - Has been having higher readings at home the past few weeks. AM fasting glucose was 311 last Friday.   Mi complication ([facility] Alcohol-induced acute pancreatitis, unspecified complication status (0/5/5694), Alcoholic pancreatitis (65/9251), Iron deficiency anemia, unspecified (12/21/2018), Normocytic anemia (9/12/2018), Sinus tachycardia, and Thrombocytopenia ( awhile. Meloxicam did not help a lot. Will check XR foot. May consider Podiatry evaluation. - XR FOOT, COMPLETE (MIN 3 VIEWS), RIGHT (CPT=73630); Future    4. Vitamin D deficiency  Started on prescription vitamin D. Re-check levels with next labs.

## 2019-08-10 DIAGNOSIS — M79.671 ACUTE PAIN OF RIGHT FOOT: ICD-10-CM

## 2019-08-12 RX ORDER — MELOXICAM 7.5 MG/1
TABLET ORAL
Qty: 30 TABLET | Refills: 0 | Status: SHIPPED | OUTPATIENT
Start: 2019-08-12 | End: 2019-09-27

## 2019-08-12 NOTE — TELEPHONE ENCOUNTER
Meloxicam 7.5 mg Oral tab    Last OV relevant to medication: 6/14/2019  Last refill date: 7/15/2019     #/refills: #30 w/ 0 refills  When pt was asked to return for OV: 3-6 months  Upcoming appt/reason: no future appointments

## 2019-08-12 NOTE — TELEPHONE ENCOUNTER
Failed protocol     Last refill:  7/15/2018 #30 NR    LOV:   8/9/2019 Dr Cj Tilley RTC -3-6 months   3. Right foot pain  Still an issue. Right heel, toe. Worse when sitting for awhile. Meloxicam did not help a lot. Will check XR foot.   May consider Podia

## 2019-08-31 DIAGNOSIS — E55.9 VITAMIN D DEFICIENCY: ICD-10-CM

## 2019-09-03 RX ORDER — ERGOCALCIFEROL 1.25 MG/1
CAPSULE ORAL
Qty: 12 CAPSULE | Refills: 0 | OUTPATIENT
Start: 2019-09-03

## 2019-09-03 NOTE — TELEPHONE ENCOUNTER
No protocol     Last refill:  6/16/2019 Vitamin D 88457 #12 NR    LOV:   8/9/2019 Dr Silvia Espana RTC 3-6 months    4. Vitamin D deficiency  Started on prescription vitamin D. Re-check levels with next labs.     No FOV scheduled     Last Vitamin D - 3 month rx

## 2019-09-03 NOTE — TELEPHONE ENCOUNTER
He should get repeat vitamin D blood test first.  If vitamin D is back to normal, he can switch to daily over the counter vitamin D.

## 2019-09-13 ENCOUNTER — PATIENT MESSAGE (OUTPATIENT)
Dept: INTERNAL MEDICINE CLINIC | Facility: CLINIC | Age: 54
End: 2019-09-13

## 2019-09-13 DIAGNOSIS — Z79.4 TYPE 2 DIABETES MELLITUS WITHOUT COMPLICATION, WITH LONG-TERM CURRENT USE OF INSULIN (HCC): Chronic | ICD-10-CM

## 2019-09-13 DIAGNOSIS — E11.9 TYPE 2 DIABETES MELLITUS WITHOUT COMPLICATION, WITH LONG-TERM CURRENT USE OF INSULIN (HCC): Chronic | ICD-10-CM

## 2019-09-13 NOTE — TELEPHONE ENCOUNTER
Patient needs a refill for Levemir long active, patient states, he only has for 3 more days.  Pharmacy is [facility] in [facility]

## 2019-09-14 DIAGNOSIS — M79.671 ACUTE PAIN OF RIGHT FOOT: ICD-10-CM

## 2019-09-14 NOTE — TELEPHONE ENCOUNTER
Called pt to inform insulin prescription will be resend to pharmacy requested. Pt verbalized understanding.

## 2019-09-16 NOTE — TELEPHONE ENCOUNTER
LMTCB - is pt still taking medication? Failed protocol     Last refill:  8/12/2019 Meloxicam 7.5 mg #30 NR    LOV:     8/9/2019 Dr Freddy De La Rosa RTC 3-6 months  3. Right foot pain  Still an issue. Right heel, toe. Worse when sitting for awhile.   Meloxicam

## 2019-09-18 RX ORDER — MELOXICAM 7.5 MG/1
TABLET ORAL
Qty: 30 TABLET | Refills: 0 | OUTPATIENT
Start: 2019-09-18

## 2019-09-26 ENCOUNTER — TELEPHONE (OUTPATIENT)
Dept: INTERNAL MEDICINE CLINIC | Facility: CLINIC | Age: 54
End: 2019-09-26

## 2019-09-26 NOTE — TELEPHONE ENCOUNTER
Pt had sexual intercourse with wife a few days ago and both noticed raised, red, painful rash in genital areas. Also, c/o dysuria. Denies discharge, hematuria, fever, nausea, vomiting, diarrhea.  No new products to the area and no protection during inte

## 2019-09-27 ENCOUNTER — OFFICE VISIT (OUTPATIENT)
Dept: INTERNAL MEDICINE CLINIC | Facility: CLINIC | Age: 54
End: 2019-09-27
Payer: COMMERCIAL

## 2019-09-27 ENCOUNTER — APPOINTMENT (OUTPATIENT)
Dept: LAB | Age: 54
End: 2019-09-27
Attending: INTERNAL MEDICINE
Payer: COMMERCIAL

## 2019-09-27 ENCOUNTER — TELEPHONE (OUTPATIENT)
Dept: INTERNAL MEDICINE CLINIC | Facility: CLINIC | Age: 54
End: 2019-09-27

## 2019-09-27 VITALS
TEMPERATURE: 98 F | HEIGHT: 72 IN | HEART RATE: 82 BPM | SYSTOLIC BLOOD PRESSURE: 106 MMHG | BODY MASS INDEX: 32.4 KG/M2 | WEIGHT: 239.25 LBS | RESPIRATION RATE: 16 BRPM | DIASTOLIC BLOOD PRESSURE: 72 MMHG | OXYGEN SATURATION: 98 %

## 2019-09-27 DIAGNOSIS — E55.9 VITAMIN D DEFICIENCY: Chronic | ICD-10-CM

## 2019-09-27 DIAGNOSIS — M79.671 ACUTE PAIN OF RIGHT FOOT: ICD-10-CM

## 2019-09-27 DIAGNOSIS — E55.9 VITAMIN D DEFICIENCY: ICD-10-CM

## 2019-09-27 DIAGNOSIS — N48.89 PENILE IRRITATION: Primary | ICD-10-CM

## 2019-09-27 DIAGNOSIS — R30.0 DYSURIA: ICD-10-CM

## 2019-09-27 DIAGNOSIS — E11.9 TYPE 2 DIABETES MELLITUS WITHOUT COMPLICATION, WITH LONG-TERM CURRENT USE OF INSULIN (HCC): Chronic | ICD-10-CM

## 2019-09-27 DIAGNOSIS — Z79.4 TYPE 2 DIABETES MELLITUS WITHOUT COMPLICATION, WITH LONG-TERM CURRENT USE OF INSULIN (HCC): Chronic | ICD-10-CM

## 2019-09-27 LAB — VIT D+METAB SERPL-MCNC: 25.5 NG/ML (ref 30–100)

## 2019-09-27 PROCEDURE — 36415 COLL VENOUS BLD VENIPUNCTURE: CPT | Performed by: INTERNAL MEDICINE

## 2019-09-27 PROCEDURE — 99214 OFFICE O/P EST MOD 30 MIN: CPT | Performed by: INTERNAL MEDICINE

## 2019-09-27 PROCEDURE — 87086 URINE CULTURE/COLONY COUNT: CPT | Performed by: INTERNAL MEDICINE

## 2019-09-27 PROCEDURE — 82306 VITAMIN D 25 HYDROXY: CPT | Performed by: INTERNAL MEDICINE

## 2019-09-27 RX ORDER — CLOTRIMAZOLE AND BETAMETHASONE DIPROPIONATE 10; .64 MG/G; MG/G
1 CREAM TOPICAL 2 TIMES DAILY PRN
Qty: 45 G | Refills: 0 | Status: SHIPPED | OUTPATIENT
Start: 2019-09-27 | End: 2020-02-07

## 2019-09-27 RX ORDER — MELOXICAM 7.5 MG/1
7.5 TABLET ORAL
Qty: 30 TABLET | Refills: 0 | Status: SHIPPED | OUTPATIENT
Start: 2019-09-27 | End: 2019-10-28

## 2019-09-27 RX ORDER — METRONIDAZOLE 500 MG/1
500 TABLET ORAL 2 TIMES DAILY
Qty: 14 TABLET | Refills: 0 | Status: SHIPPED | OUTPATIENT
Start: 2019-09-27 | End: 2019-10-04

## 2019-09-27 NOTE — TELEPHONE ENCOUNTER
Patient requesting a refill for MELOXICAM 7.5 MG Oral Tab    To be sent to:  [facility] [address] AT [address] [place] [address] Rd OF [address], [phone], [phone]

## 2019-09-27 NOTE — PATIENT INSTRUCTIONS
For you:  - Start prescription cream (betamethasone-clotrimazole). Apply twice daily.  - Start antibiotics (metronidazole). Take 1 tablet twice daily for next week. - Follow up if you are not better in 1 week.   - Change insulin dosing to 18 units twice

## 2019-09-27 NOTE — PROGRESS NOTES
Emy Castellanos is a 48year old male. HPI:   Patient presents with:  Rash: in genital area    Patient presents with penile rash/irritation. Had intercourse with wife two days ago. Both now with whitish discharge, genital irritation.   No pain, p Alcohol-induced acute pancreatitis, unspecified complication status (0/2/2643), Alcoholic pancreatitis (70/2396), Iron deficiency anemia, unspecified (12/21/2018), Normocytic anemia (9/12/2018), Sinus tachycardia, and Thrombocytopenia ([facility]   Surgical:  ha Tab; Take 1 tablet (7.5 mg total) by mouth once daily. Dispense: 30 tablet; Refill: 0    4. Type 2 diabetes mellitus without complication, with long-term current use of insulin ([address]  Still with elevated readings.   Will change detemir dosing to 18 units B

## 2019-10-04 ENCOUNTER — NURSE ONLY (OUTPATIENT)
Dept: INTERNAL MEDICINE CLINIC | Facility: CLINIC | Age: 54
End: 2019-10-04
Payer: COMMERCIAL

## 2019-10-04 DIAGNOSIS — Z23 NEED FOR VACCINATION AGAINST STREPTOCOCCUS PNEUMONIAE: Primary | ICD-10-CM

## 2019-10-04 DIAGNOSIS — Z23 NEED FOR INFLUENZA VACCINATION: ICD-10-CM

## 2019-10-04 PROCEDURE — 90472 IMMUNIZATION ADMIN EACH ADD: CPT | Performed by: INTERNAL MEDICINE

## 2019-10-04 PROCEDURE — 90471 IMMUNIZATION ADMIN: CPT | Performed by: INTERNAL MEDICINE

## 2019-10-04 PROCEDURE — 90732 PPSV23 VACC 2 YRS+ SUBQ/IM: CPT | Performed by: INTERNAL MEDICINE

## 2019-10-04 PROCEDURE — 90686 IIV4 VACC NO PRSV 0.5 ML IM: CPT | Performed by: INTERNAL MEDICINE

## 2019-10-08 NOTE — PROGRESS NOTES
Adair Linares is a 48year old male. HPI:   Patient presents with: Follow - Up  Other: Due for CPX (Colonoscopy Scheduled for October) /Diabetic Eye Exam and Labs   Patient presents for follow up on chronic medical issues.    Pancreatitis - Andry Castillo insulin, test blood glucose 3 times per day before meals  Non-Medicare:  Test blood glucose 4-5 times per day before meals, bedtime and for signs, symptoms of hypoglycemia or as ordered by physician, Disp: 50 strip, Rfl: 6  •  insulin detemir 100 UNIT/ML S Location: Left arm, Patient Position: Sitting, Cuff Size: adult)   Pulse 84   Temp 98 °F (36.7 °C) (Oral)   Resp 18   Ht 72\"   Wt 217 lb 8 oz   SpO2 97%   BMI 29.50 kg/m²   GENERAL: Alert and oriented, well developed, well nourished,in no apparent distres issues arise.     Mariaelena Whatley MD Post-Care Instructions: I reviewed with the patient in detail post-care instructions. Patient is to keep the biopsy site dry overnight, and then apply bacitracin twice daily until healed. Patient may apply hydrogen peroxide soaks to remove any crusting.

## 2019-10-28 DIAGNOSIS — M79.671 ACUTE PAIN OF RIGHT FOOT: ICD-10-CM

## 2019-10-30 RX ORDER — MELOXICAM 7.5 MG/1
TABLET ORAL
Qty: 30 TABLET | Refills: 0 | Status: SHIPPED | OUTPATIENT
Start: 2019-10-30 | End: 2019-11-26

## 2019-10-30 NOTE — TELEPHONE ENCOUNTER
Failed protocol     Last refill:  9/27/2019 Meloxicam 7.5 mg #30 NR    LOV:   9/27/2019 Dr Robert Kevin RTC 3 months  3. Acute pain of right foot  For past few weeks.   Will start on meloxicam. Consider Podiatry referral if symptoms persist.  - Meloxicam 7.5 MG

## 2019-11-26 DIAGNOSIS — M79.671 ACUTE PAIN OF RIGHT FOOT: ICD-10-CM

## 2019-11-26 RX ORDER — MELOXICAM 7.5 MG/1
TABLET ORAL
Qty: 30 TABLET | Refills: 0 | Status: SHIPPED | OUTPATIENT
Start: 2019-11-26 | End: 2019-12-26

## 2019-11-26 NOTE — TELEPHONE ENCOUNTER
Failed protocol     Last refill:  10/30/2019 Meloxicam 7.5 mg #30 NR    LOV:   9/27/2019 Dr Marcie Blas RTC 3 months  3. Acute pain of right foot  For past few weeks.   Will start on meloxicam. Consider Podiatry referral if symptoms persist.  No FOV scheduled

## 2019-12-18 DIAGNOSIS — E78.2 MIXED HYPERLIPIDEMIA: ICD-10-CM

## 2019-12-18 RX ORDER — ATORVASTATIN CALCIUM 10 MG/1
TABLET, FILM COATED ORAL
Qty: 90 TABLET | Refills: 0 | Status: SHIPPED | OUTPATIENT
Start: 2019-12-18 | End: 2020-03-18

## 2019-12-26 DIAGNOSIS — M79.671 ACUTE PAIN OF RIGHT FOOT: ICD-10-CM

## 2019-12-26 RX ORDER — MELOXICAM 7.5 MG/1
TABLET ORAL
Qty: 30 TABLET | Refills: 1 | Status: SHIPPED | OUTPATIENT
Start: 2019-12-26 | End: 2020-02-19

## 2019-12-26 NOTE — TELEPHONE ENCOUNTER
MELOXICAM 7.5 MG Oral Tab    Last OV relevant to medication: 9/27/2019  Last refill date: 11/26/2019     #/refills: #30 w/ 0 refills   When pt was asked to return for OV: 3 months   Upcoming appt/reason: no future appointments

## 2020-02-07 ENCOUNTER — LAB ENCOUNTER (OUTPATIENT)
Dept: LAB | Age: 55
End: 2020-02-07
Attending: INTERNAL MEDICINE
Payer: COMMERCIAL

## 2020-02-07 ENCOUNTER — OFFICE VISIT (OUTPATIENT)
Dept: INTERNAL MEDICINE CLINIC | Facility: CLINIC | Age: 55
End: 2020-02-07
Payer: COMMERCIAL

## 2020-02-07 VITALS
HEIGHT: 72 IN | WEIGHT: 238.5 LBS | TEMPERATURE: 98 F | BODY MASS INDEX: 32.3 KG/M2 | SYSTOLIC BLOOD PRESSURE: 110 MMHG | HEART RATE: 80 BPM | DIASTOLIC BLOOD PRESSURE: 70 MMHG | RESPIRATION RATE: 16 BRPM

## 2020-02-07 DIAGNOSIS — Z79.4 TYPE 2 DIABETES MELLITUS WITHOUT COMPLICATION, WITH LONG-TERM CURRENT USE OF INSULIN (HCC): Primary | Chronic | ICD-10-CM

## 2020-02-07 DIAGNOSIS — E78.2 MIXED HYPERLIPIDEMIA: Chronic | ICD-10-CM

## 2020-02-07 DIAGNOSIS — R61 NIGHT SWEATS: ICD-10-CM

## 2020-02-07 DIAGNOSIS — J02.9 SORE THROAT: ICD-10-CM

## 2020-02-07 DIAGNOSIS — E11.9 TYPE 2 DIABETES MELLITUS WITHOUT COMPLICATION, WITH LONG-TERM CURRENT USE OF INSULIN (HCC): Primary | Chronic | ICD-10-CM

## 2020-02-07 DIAGNOSIS — E55.9 VITAMIN D DEFICIENCY: ICD-10-CM

## 2020-02-07 LAB
BASOPHILS # BLD AUTO: 0.05 X10(3) UL (ref 0–0.2)
BASOPHILS NFR BLD AUTO: 0.6 %
CARTRIDGE EXPIRATION DATE: 588 DATE
CARTRIDGE LOT#: 588 NUMERIC
DEPRECATED RDW RBC AUTO: 43.8 FL (ref 35.1–46.3)
EOSINOPHIL # BLD AUTO: 0.43 X10(3) UL (ref 0–0.7)
EOSINOPHIL NFR BLD AUTO: 4.8 %
ERYTHROCYTE [DISTWIDTH] IN BLOOD BY AUTOMATED COUNT: 13.3 % (ref 11–15)
HCT VFR BLD AUTO: 45.9 % (ref 39–53)
HEMOGLOBIN A1C: 8.3 % (ref 4.3–5.6)
HGB BLD-MCNC: 15.2 G/DL (ref 13–17.5)
IMM GRANULOCYTES # BLD AUTO: 0.03 X10(3) UL (ref 0–1)
IMM GRANULOCYTES NFR BLD: 0.3 %
LYMPHOCYTES # BLD AUTO: 2.43 X10(3) UL (ref 1–4)
LYMPHOCYTES NFR BLD AUTO: 27.3 %
MCH RBC QN AUTO: 29.7 PG (ref 26–34)
MCHC RBC AUTO-ENTMCNC: 33.1 G/DL (ref 31–37)
MCV RBC AUTO: 89.8 FL (ref 80–100)
MONOCYTES # BLD AUTO: 0.57 X10(3) UL (ref 0.1–1)
MONOCYTES NFR BLD AUTO: 6.4 %
NEUTROPHILS # BLD AUTO: 5.4 X10 (3) UL (ref 1.5–7.7)
NEUTROPHILS # BLD AUTO: 5.4 X10(3) UL (ref 1.5–7.7)
NEUTROPHILS NFR BLD AUTO: 60.6 %
PLATELET # BLD AUTO: 219 10(3)UL (ref 150–450)
RBC # BLD AUTO: 5.11 X10(6)UL (ref 4.3–5.7)
TSI SER-ACNC: 0.41 MIU/ML (ref 0.36–3.74)
WBC # BLD AUTO: 8.9 X10(3) UL (ref 4–11)

## 2020-02-07 PROCEDURE — 99214 OFFICE O/P EST MOD 30 MIN: CPT | Performed by: INTERNAL MEDICINE

## 2020-02-07 PROCEDURE — 36415 COLL VENOUS BLD VENIPUNCTURE: CPT | Performed by: INTERNAL MEDICINE

## 2020-02-07 PROCEDURE — 85025 COMPLETE CBC W/AUTO DIFF WBC: CPT | Performed by: INTERNAL MEDICINE

## 2020-02-07 PROCEDURE — 84443 ASSAY THYROID STIM HORMONE: CPT | Performed by: INTERNAL MEDICINE

## 2020-02-07 PROCEDURE — 83036 HEMOGLOBIN GLYCOSYLATED A1C: CPT | Performed by: INTERNAL MEDICINE

## 2020-02-07 RX ORDER — CLOTRIMAZOLE AND BETAMETHASONE DIPROPIONATE 10; .64 MG/G; MG/G
1 CREAM TOPICAL 2 TIMES DAILY PRN
Qty: 45 G | Refills: 1 | Status: SHIPPED | OUTPATIENT
Start: 2020-02-07 | End: 2020-05-29

## 2020-02-07 NOTE — PROGRESS NOTES
rIa Spencer is a 48year old male. HPI:   Patient presents with:  Diabetes: Pt is going to make an appt with optho for eye exam; he does have an appt with urology today; he is limited on his work schedule to Friday.    Night Sweats: Pt c/o mae NIGHT AT BEDTIME, Disp: 90 tablet, Rfl: 0  •  insulin detemir 100 UNIT/ML Subcutaneous Solution Pen-injector, Inject 30 Units into the skin every morning., Disp: 5 pen, Rfl: 3  •  BD PEN NEEDLE SHA U/F 32G X 4 MM Does not apply Misc, USE A NEW PEN NEEDLE appearance is normal.  Bilateral monofilament/sensation of both feet is normal.  Pulsation pedal pulse exam of both lower legs/feet is normal as well. PSYCH: pleasant, appropriate mood and affect  ASSESSMENT AND PLAN:   1.  Type 2 diabetes mellitus without

## 2020-02-07 NOTE — PATIENT INSTRUCTIONS
- Get thyroid and blood count tests done today for your night sweats. - Follow up with me in 3 months (May 15th or later).   Get fasting blood tests done at least 1-2 days before your appointment.  - Focus on healthy diet/regular exercise  - If your A1c is

## 2020-02-08 DIAGNOSIS — Z79.4 TYPE 2 DIABETES MELLITUS WITHOUT COMPLICATION, WITH LONG-TERM CURRENT USE OF INSULIN (HCC): Primary | Chronic | ICD-10-CM

## 2020-02-08 DIAGNOSIS — E78.2 MIXED HYPERLIPIDEMIA: Chronic | ICD-10-CM

## 2020-02-08 DIAGNOSIS — E11.9 TYPE 2 DIABETES MELLITUS WITHOUT COMPLICATION, WITH LONG-TERM CURRENT USE OF INSULIN (HCC): Primary | Chronic | ICD-10-CM

## 2020-02-19 DIAGNOSIS — M79.671 ACUTE PAIN OF RIGHT FOOT: ICD-10-CM

## 2020-02-19 RX ORDER — MELOXICAM 7.5 MG/1
TABLET ORAL
Qty: 30 TABLET | Refills: 2 | Status: SHIPPED | OUTPATIENT
Start: 2020-02-19 | End: 2020-05-14

## 2020-02-19 NOTE — TELEPHONE ENCOUNTER
Last OV: 2/7/2020 with Dr. Margie Rodriguez  Last refill date: 12/26/19     #/refills: #30, 1 refill  When pt was asked to return for OV: 3 months  Upcoming appt: 5/15/2020 with Dr. Margie Rodriguez  Last labs 2/7/2020

## 2020-02-27 DIAGNOSIS — E11.9 TYPE 2 DIABETES MELLITUS WITHOUT COMPLICATION, WITH LONG-TERM CURRENT USE OF INSULIN (HCC): Chronic | ICD-10-CM

## 2020-02-27 DIAGNOSIS — Z79.4 TYPE 2 DIABETES MELLITUS WITHOUT COMPLICATION, WITH LONG-TERM CURRENT USE OF INSULIN (HCC): Chronic | ICD-10-CM

## 2020-02-27 NOTE — TELEPHONE ENCOUNTER
Failed protocol     Last refill:  9/14/2019 Insulin detemir 5 pen 3R    LOV:   2/7/2020 dr Mela Tamayo RTC 3 months  1. Type 2 diabetes mellitus without complication, with long-term current use of insulin ([facility])  A1c above goal at 8.3.   Focus on lifestyle modif

## 2020-02-28 RX ORDER — PEN NEEDLE, DIABETIC 32GX 5/32"
NEEDLE, DISPOSABLE MISCELLANEOUS
Qty: 100 EACH | Refills: 0 | Status: SHIPPED | OUTPATIENT
Start: 2020-02-28 | End: 2020-03-20

## 2020-03-17 DIAGNOSIS — E78.2 MIXED HYPERLIPIDEMIA: ICD-10-CM

## 2020-03-18 RX ORDER — ATORVASTATIN CALCIUM 10 MG/1
TABLET, FILM COATED ORAL
Qty: 90 TABLET | Refills: 1 | Status: SHIPPED | OUTPATIENT
Start: 2020-03-18 | End: 2020-10-12

## 2020-03-18 NOTE — TELEPHONE ENCOUNTER
Last OV: 2/7/2020 with Dr. Antoinette Melchor   Last refill date: 12/18/19     #/refills: #90, 0 refills  When pt was asked to return for OV: 3 months  Upcoming appt: 5/15/2020 with Dr. Antoinette Melchor   Last labs 2/7/2020

## 2020-03-20 RX ORDER — PEN NEEDLE, DIABETIC 32GX 5/32"
NEEDLE, DISPOSABLE MISCELLANEOUS
Qty: 100 EACH | Refills: 0 | Status: SHIPPED | OUTPATIENT
Start: 2020-03-20 | End: 2020-06-24

## 2020-03-20 NOTE — TELEPHONE ENCOUNTER
BD pen needle tom e/f 32G x4 mm use as directed filled 2-28-20 100 each with 0 refills     LOV 2-7-20   return to clinic in 3 months   Next apt 5-15-20   Labs 2-7-20   HEMOGLOBIN A1C  4.3 - 5.6 % 8.3

## 2020-04-09 DIAGNOSIS — Z79.4 TYPE 2 DIABETES MELLITUS WITHOUT COMPLICATION, WITH LONG-TERM CURRENT USE OF INSULIN (HCC): Chronic | ICD-10-CM

## 2020-04-09 DIAGNOSIS — E11.9 TYPE 2 DIABETES MELLITUS WITHOUT COMPLICATION, WITH LONG-TERM CURRENT USE OF INSULIN (HCC): Chronic | ICD-10-CM

## 2020-04-09 NOTE — TELEPHONE ENCOUNTER
Patient calling for same day appointment he has a rash that has formed in his genital area and getting worse; he states his wife has same rash and bleeding; please call to triage symptoms Tetracycline Counseling: Patient counseled regarding possible photosensitivity and increased risk for sunburn.  Patient instructed to avoid sunlight, if possible.  When exposed to sunlight, patients should wear protective clothing, sunglasses, and sunscreen.  The patient was instructed to call the office immediately if the following severe adverse effects occur:  hearing changes, easy bruising/bleeding, severe headache, or vision changes.  The patient verbalized understanding of the proper use and possible adverse effects of tetracycline.  All of the patient's questions and concerns were addressed. Patient understands to avoid pregnancy while on therapy due to potential birth defects.

## 2020-04-09 NOTE — TELEPHONE ENCOUNTER
Insulin Detemir   Last OV relevant to medication: 2-7-20  Last refill date: 2-27-20 #/refills: 3  When pt was asked to return for OV: 3 mo.    Upcoming appt/reason: 5-15-20  Recent labs: 2-7-20: HgBA1c

## 2020-04-22 ENCOUNTER — LAB ENCOUNTER (OUTPATIENT)
Dept: LAB | Facility: HOSPITAL | Age: 55
End: 2020-04-22
Attending: INTERNAL MEDICINE
Payer: COMMERCIAL

## 2020-04-22 ENCOUNTER — VIRTUAL PHONE E/M (OUTPATIENT)
Dept: INTERNAL MEDICINE CLINIC | Facility: CLINIC | Age: 55
End: 2020-04-22
Payer: COMMERCIAL

## 2020-04-22 DIAGNOSIS — R51.9 BILATERAL HEADACHES: ICD-10-CM

## 2020-04-22 DIAGNOSIS — E11.9 TYPE 2 DIABETES MELLITUS WITHOUT COMPLICATION, WITH LONG-TERM CURRENT USE OF INSULIN (HCC): ICD-10-CM

## 2020-04-22 DIAGNOSIS — Z20.822 CLOSE EXPOSURE TO COVID-19 VIRUS: Primary | ICD-10-CM

## 2020-04-22 DIAGNOSIS — R06.02 SHORTNESS OF BREATH: ICD-10-CM

## 2020-04-22 DIAGNOSIS — R05.9 COUGH: ICD-10-CM

## 2020-04-22 DIAGNOSIS — R19.7 DIARRHEA, UNSPECIFIED TYPE: ICD-10-CM

## 2020-04-22 DIAGNOSIS — Z79.4 TYPE 2 DIABETES MELLITUS WITHOUT COMPLICATION, WITH LONG-TERM CURRENT USE OF INSULIN (HCC): ICD-10-CM

## 2020-04-22 DIAGNOSIS — Z20.822 CLOSE EXPOSURE TO COVID-19 VIRUS: ICD-10-CM

## 2020-04-22 PROCEDURE — 99213 OFFICE O/P EST LOW 20 MIN: CPT | Performed by: INTERNAL MEDICINE

## 2020-04-22 NOTE — PROGRESS NOTES
Virtual Telephone Check-In    Gill Jeronimo verbally consents to a Virtual/Telephone Check-In service on 04/22/20.   Patient understands and accepts financial responsibility for any deductible, co-insurance and/or co-pays associated with this servi COVID-19. Patient started having a cough, shortness of breath this morning. No fever. Diarrhea as well. Bilateral headaches. His glucose readings have been in the 150's-170's lately; however this morning, AM fasting glucose was 300.   In light of his s

## 2020-04-23 PROBLEM — U07.1 COVID-19 VIRUS INFECTION: Status: ACTIVE | Noted: 2020-04-23

## 2020-05-14 DIAGNOSIS — M79.671 ACUTE PAIN OF RIGHT FOOT: ICD-10-CM

## 2020-05-14 RX ORDER — MELOXICAM 7.5 MG/1
TABLET ORAL
Qty: 30 TABLET | Refills: 2 | Status: SHIPPED | OUTPATIENT
Start: 2020-05-14 | End: 2020-09-10

## 2020-05-14 NOTE — TELEPHONE ENCOUNTER
Meloxicam 7.5 mg 1 tab daily filled 2-19-20 30 with 2 refills     LOV 2-7-20   return to clinic in 3 months   Next apt 5-15-20   Labs 2-7-20

## 2020-05-15 ENCOUNTER — VIRTUAL PHONE E/M (OUTPATIENT)
Dept: INTERNAL MEDICINE CLINIC | Facility: CLINIC | Age: 55
End: 2020-05-15
Payer: COMMERCIAL

## 2020-05-15 DIAGNOSIS — E55.9 VITAMIN D DEFICIENCY: ICD-10-CM

## 2020-05-15 DIAGNOSIS — U07.1 COVID-19 VIRUS INFECTION: Primary | ICD-10-CM

## 2020-05-15 DIAGNOSIS — Z79.4 TYPE 2 DIABETES MELLITUS WITHOUT COMPLICATION, WITH LONG-TERM CURRENT USE OF INSULIN (HCC): Chronic | ICD-10-CM

## 2020-05-15 DIAGNOSIS — E11.9 TYPE 2 DIABETES MELLITUS WITHOUT COMPLICATION, WITH LONG-TERM CURRENT USE OF INSULIN (HCC): Chronic | ICD-10-CM

## 2020-05-15 DIAGNOSIS — E78.2 MIXED HYPERLIPIDEMIA: Chronic | ICD-10-CM

## 2020-05-15 PROCEDURE — 99214 OFFICE O/P EST MOD 30 MIN: CPT | Performed by: INTERNAL MEDICINE

## 2020-05-15 NOTE — PATIENT INSTRUCTIONS
- Continue current medications  - I will send in a new prescription for the Levemir at 18 units twice daily dosing  - Get fasting blood work done in the middle or end of June  - We will let you know when we need to see you in the clinic again once we get b

## 2020-05-15 NOTE — PROGRESS NOTES
America Diehl verbally consents to a Virtual/Telephone Check-In service on 05/15/20. Patient is in the state of [place] during this phone visit.   Patient understands and accepts financial responsibility for any deductible, co-insurance and/or co mellitus without complication, with long-term current use of insulin ([address]  He had high readings (300's) right as he developed COVID-19 infection. Better readings now, though still slightly high (140's - low 200's).   Will continue Levemir at 18 units BID

## 2020-06-02 RX ORDER — CLOTRIMAZOLE AND BETAMETHASONE DIPROPIONATE 10; .64 MG/G; MG/G
1 CREAM TOPICAL 2 TIMES DAILY PRN
Qty: 45 G | Refills: 1 | Status: SHIPPED | OUTPATIENT
Start: 2020-06-02 | End: 2020-07-24

## 2020-06-02 NOTE — TELEPHONE ENCOUNTER
Clotrimazole betamethasone 1-. 0.05% filled 2-7-20 45 g with 1 refill     LOV 2-7-20   return to clinic in 3 months   No upcoming apt on file  Labs 2-7-20

## 2020-06-24 DIAGNOSIS — E11.9 TYPE 2 DIABETES MELLITUS WITHOUT COMPLICATION, WITH LONG-TERM CURRENT USE OF INSULIN (HCC): Primary | Chronic | ICD-10-CM

## 2020-06-24 DIAGNOSIS — Z79.4 TYPE 2 DIABETES MELLITUS WITHOUT COMPLICATION, WITH LONG-TERM CURRENT USE OF INSULIN (HCC): Primary | Chronic | ICD-10-CM

## 2020-06-25 RX ORDER — PEN NEEDLE, DIABETIC 32GX 5/32"
NEEDLE, DISPOSABLE MISCELLANEOUS
Qty: 100 EACH | Refills: 3 | Status: SHIPPED | OUTPATIENT
Start: 2020-06-25 | End: 2020-06-26

## 2020-06-25 NOTE — TELEPHONE ENCOUNTER
BD pen needle once a day filled 3-20-20 100 with 0 refills     LOV 2-7-20   return to clinic in 3 months   No upcoming apt on file   Labs 2-7-20   HEMOGLOBIN A1C  4.3 - 5.6 % 8.3Abnormal       LvM due for ov for further refills.

## 2020-06-26 RX ORDER — PEN NEEDLE, DIABETIC 32GX 5/32"
NEEDLE, DISPOSABLE MISCELLANEOUS
Qty: 100 EACH | Refills: 3 | Status: SHIPPED | OUTPATIENT
Start: 2020-06-26 | End: 2021-01-08

## 2020-07-24 ENCOUNTER — OFFICE VISIT (OUTPATIENT)
Dept: INTERNAL MEDICINE CLINIC | Facility: CLINIC | Age: 55
End: 2020-07-24
Payer: COMMERCIAL

## 2020-07-24 VITALS
HEART RATE: 66 BPM | RESPIRATION RATE: 16 BRPM | DIASTOLIC BLOOD PRESSURE: 70 MMHG | OXYGEN SATURATION: 100 % | BODY MASS INDEX: 31.83 KG/M2 | WEIGHT: 235 LBS | TEMPERATURE: 98 F | SYSTOLIC BLOOD PRESSURE: 110 MMHG | HEIGHT: 72 IN

## 2020-07-24 DIAGNOSIS — L98.9 LESION OF NECK: Primary | ICD-10-CM

## 2020-07-24 PROCEDURE — 99214 OFFICE O/P EST MOD 30 MIN: CPT | Performed by: INTERNAL MEDICINE

## 2020-07-24 PROCEDURE — 3078F DIAST BP <80 MM HG: CPT | Performed by: INTERNAL MEDICINE

## 2020-07-24 PROCEDURE — 3008F BODY MASS INDEX DOCD: CPT | Performed by: INTERNAL MEDICINE

## 2020-07-24 PROCEDURE — 3074F SYST BP LT 130 MM HG: CPT | Performed by: INTERNAL MEDICINE

## 2020-07-24 RX ORDER — CLOTRIMAZOLE AND BETAMETHASONE DIPROPIONATE 10; .64 MG/G; MG/G
1 CREAM TOPICAL 2 TIMES DAILY PRN
Qty: 45 G | Refills: 2 | Status: SHIPPED | OUTPATIENT
Start: 2020-07-24 | End: 2021-01-08

## 2020-07-24 NOTE — PROGRESS NOTES
Bobbi Gonsalez is a 50year old male. HPI:   Patient presents with:  Bump: right side bump on neck     Patient presents with lesion/bump on right side of his neck.   He first noticed it several weeks ago, but it has been significantly enlarging ov has no past surgical history on file. Family: family history includes Cancer in his mother. Social:  reports that he has never smoked. He has never used smokeless tobacco. He reports that he does not drink alcohol or use drugs.   Wt Readings from Last 6 E to return to clinic in 3-6 months with myself for follow up on chronic issues, or earlier if acute issues arise.     Joslyn Ndiaye MD

## 2020-07-24 NOTE — PATIENT INSTRUCTIONS
- Schedule ultrasound of neck.   Call [phone] to schedule  - Follow up with ENT specialist.  Try Dr. Harper [facility]a [facility] first - if they cannot see you soon you can try Dr. Milan Childers  - We re-scheduled your wife's appointment for FADUMO

## 2020-07-26 PROBLEM — U07.1 COVID-19 VIRUS INFECTION: Status: RESOLVED | Noted: 2020-04-23 | Resolved: 2020-07-26

## 2020-08-01 ENCOUNTER — MED REC SCAN ONLY (OUTPATIENT)
Dept: INTERNAL MEDICINE CLINIC | Facility: CLINIC | Age: 55
End: 2020-08-01

## 2020-08-05 ENCOUNTER — HOSPITAL ENCOUNTER (OUTPATIENT)
Dept: ULTRASOUND IMAGING | Facility: HOSPITAL | Age: 55
Discharge: HOME | End: 2020-08-05
Attending: INTERNAL MEDICINE
Payer: COMMERCIAL

## 2020-08-05 DIAGNOSIS — L98.9 LESION OF NECK: ICD-10-CM

## 2020-08-05 PROCEDURE — 76536 US EXAM OF HEAD AND NECK: CPT | Performed by: INTERNAL MEDICINE

## 2020-08-26 ENCOUNTER — HOSPITAL ENCOUNTER (OUTPATIENT)
Dept: CT IMAGING | Age: 55
Discharge: HOME | End: 2020-08-26
Attending: OTOLARYNGOLOGY
Payer: COMMERCIAL

## 2020-08-26 DIAGNOSIS — R22.1 MASS OF RIGHT SIDE OF NECK: ICD-10-CM

## 2020-08-26 LAB — CREAT BLD-MCNC: 0.7 MG/DL (ref 0.7–1.3)

## 2020-08-26 PROCEDURE — 70491 CT SOFT TISSUE NECK W/DYE: CPT | Performed by: OTOLARYNGOLOGY

## 2020-08-26 PROCEDURE — 82565 ASSAY OF CREATININE: CPT

## 2020-09-09 DIAGNOSIS — M79.671 ACUTE PAIN OF RIGHT FOOT: ICD-10-CM

## 2020-09-10 RX ORDER — MELOXICAM 7.5 MG/1
TABLET ORAL
Qty: 30 TABLET | Refills: 2 | Status: ON HOLD | OUTPATIENT
Start: 2020-09-10 | End: 2020-10-01

## 2020-09-10 NOTE — TELEPHONE ENCOUNTER
LOV: 7/24/2020 with Dr. Di Gallardo   RTC: 3 months  Last Relevant Labs: 2/7/2020  Filled: 5/14/2020  #30 with 2 refills    No future appointments.

## 2020-09-23 NOTE — H&P
[address] Patient Status:  Hospital Outpatient Surgery    1965 MRN [id number]   [facility] SURGERY Attending John Olmedo MD   Hosp Day # 0 PCP Yi Lay MD     History of Plan:  Patient Active Problem List:     Tremors of nervous system     Type 2 diabetes mellitus without complication, with long-term current use of insulin (HCC)     Diverticulosis of large intestine without perforation or abscess without bleeding     First

## 2020-09-28 ENCOUNTER — APPOINTMENT (OUTPATIENT)
Dept: LAB | Age: 55
End: 2020-09-28
Attending: OTOLARYNGOLOGY
Payer: COMMERCIAL

## 2020-09-28 DIAGNOSIS — R22.1 NECK MASS: ICD-10-CM

## 2020-09-29 ENCOUNTER — LABORATORY ENCOUNTER (OUTPATIENT)
Dept: LAB | Facility: HOSPITAL | Age: 55
End: 2020-09-29
Payer: COMMERCIAL

## 2020-09-29 ENCOUNTER — APPOINTMENT (OUTPATIENT)
Dept: LAB | Facility: HOSPITAL | Age: 55
End: 2020-09-29
Payer: COMMERCIAL

## 2020-09-29 DIAGNOSIS — R22.1 MASS OF RIGHT SIDE OF NECK: ICD-10-CM

## 2020-09-29 PROCEDURE — 93010 ELECTROCARDIOGRAM REPORT: CPT | Performed by: INTERNAL MEDICINE

## 2020-09-29 PROCEDURE — 85025 COMPLETE CBC W/AUTO DIFF WBC: CPT

## 2020-09-29 PROCEDURE — 93005 ELECTROCARDIOGRAM TRACING: CPT

## 2020-09-29 PROCEDURE — 80048 BASIC METABOLIC PNL TOTAL CA: CPT

## 2020-09-29 PROCEDURE — 36415 COLL VENOUS BLD VENIPUNCTURE: CPT

## 2020-09-30 LAB
ATRIAL RATE: 82 BPM
P AXIS: 43 DEGREES
P-R INTERVAL: 136 MS
Q-T INTERVAL: 364 MS
QRS DURATION: 80 MS
QTC CALCULATION (BEZET): 425 MS
R AXIS: -12 DEGREES
T AXIS: 32 DEGREES
VENTRICULAR RATE: 82 BPM

## 2020-10-01 ENCOUNTER — ANESTHESIA (OUTPATIENT)
Dept: SURGERY | Facility: HOSPITAL | Age: 55
End: 2020-10-01
Payer: COMMERCIAL

## 2020-10-01 ENCOUNTER — HOSPITAL ENCOUNTER (OUTPATIENT)
Facility: HOSPITAL | Age: 55
Setting detail: HOSPITAL OUTPATIENT SURGERY
Discharge: HOME | End: 2020-10-01
Attending: OTOLARYNGOLOGY | Admitting: OTOLARYNGOLOGY
Payer: COMMERCIAL

## 2020-10-01 ENCOUNTER — ANESTHESIA EVENT (OUTPATIENT)
Dept: SURGERY | Facility: HOSPITAL | Age: 55
End: 2020-10-01
Payer: COMMERCIAL

## 2020-10-01 VITALS
HEART RATE: 70 BPM | BODY MASS INDEX: 31.14 KG/M2 | HEIGHT: 72 IN | WEIGHT: 229.94 LBS | RESPIRATION RATE: 16 BRPM | OXYGEN SATURATION: 97 % | TEMPERATURE: 98 F | SYSTOLIC BLOOD PRESSURE: 130 MMHG | DIASTOLIC BLOOD PRESSURE: 83 MMHG

## 2020-10-01 DIAGNOSIS — R22.1 MASS OF RIGHT SIDE OF NECK: Primary | ICD-10-CM

## 2020-10-01 PROCEDURE — 88304 TISSUE EXAM BY PATHOLOGIST: CPT | Performed by: OTOLARYNGOLOGY

## 2020-10-01 PROCEDURE — 0HB4XZZ EXCISION OF NECK SKIN, EXTERNAL APPROACH: ICD-10-PCS | Performed by: OTOLARYNGOLOGY

## 2020-10-01 PROCEDURE — 82962 GLUCOSE BLOOD TEST: CPT

## 2020-10-01 PROCEDURE — 0HQ4XZZ REPAIR NECK SKIN, EXTERNAL APPROACH: ICD-10-PCS | Performed by: OTOLARYNGOLOGY

## 2020-10-01 PROCEDURE — 88305 TISSUE EXAM BY PATHOLOGIST: CPT | Performed by: OTOLARYNGOLOGY

## 2020-10-01 RX ORDER — DEXTROSE MONOHYDRATE 25 G/50ML
50 INJECTION, SOLUTION INTRAVENOUS
Status: DISCONTINUED | OUTPATIENT
Start: 2020-10-01 | End: 2020-10-01

## 2020-10-01 RX ORDER — DIPHENHYDRAMINE HYDROCHLORIDE 50 MG/ML
12.5 INJECTION INTRAMUSCULAR; INTRAVENOUS AS NEEDED
Status: DISCONTINUED | OUTPATIENT
Start: 2020-10-01 | End: 2020-10-01

## 2020-10-01 RX ORDER — ACETAMINOPHEN 500 MG
1000 TABLET ORAL ONCE
Status: ON HOLD | COMMUNITY
End: 2020-10-01

## 2020-10-01 RX ORDER — DEXAMETHASONE SODIUM PHOSPHATE 4 MG/ML
VIAL (ML) INJECTION AS NEEDED
Status: DISCONTINUED | OUTPATIENT
Start: 2020-10-01 | End: 2020-10-01 | Stop reason: SURG

## 2020-10-01 RX ORDER — CEFAZOLIN SODIUM/WATER 2 G/20 ML
2 SYRINGE (ML) INTRAVENOUS ONCE
Status: COMPLETED | OUTPATIENT
Start: 2020-10-01 | End: 2020-10-01

## 2020-10-01 RX ORDER — MIDAZOLAM HYDROCHLORIDE 1 MG/ML
INJECTION INTRAMUSCULAR; INTRAVENOUS AS NEEDED
Status: DISCONTINUED | OUTPATIENT
Start: 2020-10-01 | End: 2020-10-01 | Stop reason: SURG

## 2020-10-01 RX ORDER — ROCURONIUM BROMIDE 10 MG/ML
INJECTION, SOLUTION INTRAVENOUS AS NEEDED
Status: DISCONTINUED | OUTPATIENT
Start: 2020-10-01 | End: 2020-10-01 | Stop reason: SURG

## 2020-10-01 RX ORDER — ONDANSETRON 2 MG/ML
4 INJECTION INTRAMUSCULAR; INTRAVENOUS AS NEEDED
Status: DISCONTINUED | OUTPATIENT
Start: 2020-10-01 | End: 2020-10-01

## 2020-10-01 RX ORDER — SODIUM CHLORIDE, SODIUM LACTATE, POTASSIUM CHLORIDE, CALCIUM CHLORIDE 600; 310; 30; 20 MG/100ML; MG/100ML; MG/100ML; MG/100ML
INJECTION, SOLUTION INTRAVENOUS CONTINUOUS
Status: DISCONTINUED | OUTPATIENT
Start: 2020-10-01 | End: 2020-10-01

## 2020-10-01 RX ORDER — HYDROCODONE BITARTRATE AND ACETAMINOPHEN 5; 325 MG/1; MG/1
1 TABLET ORAL AS NEEDED
Status: COMPLETED | OUTPATIENT
Start: 2020-10-01 | End: 2020-10-01

## 2020-10-01 RX ORDER — NALOXONE HYDROCHLORIDE 0.4 MG/ML
80 INJECTION, SOLUTION INTRAMUSCULAR; INTRAVENOUS; SUBCUTANEOUS AS NEEDED
Status: DISCONTINUED | OUTPATIENT
Start: 2020-10-01 | End: 2020-10-01

## 2020-10-01 RX ORDER — INSULIN ASPART 100 [IU]/ML
3 INJECTION, SOLUTION INTRAVENOUS; SUBCUTANEOUS ONCE
Status: COMPLETED | OUTPATIENT
Start: 2020-10-01 | End: 2020-10-01

## 2020-10-01 RX ORDER — HYDROMORPHONE HYDROCHLORIDE 1 MG/ML
0.4 INJECTION, SOLUTION INTRAMUSCULAR; INTRAVENOUS; SUBCUTANEOUS EVERY 5 MIN PRN
Status: DISCONTINUED | OUTPATIENT
Start: 2020-10-01 | End: 2020-10-01

## 2020-10-01 RX ORDER — DEXTROSE MONOHYDRATE 25 G/50ML
50 INJECTION, SOLUTION INTRAVENOUS
Status: DISCONTINUED | OUTPATIENT
Start: 2020-10-01 | End: 2020-10-01 | Stop reason: HOSPADM

## 2020-10-01 RX ORDER — INSULIN ASPART 100 [IU]/ML
INJECTION, SOLUTION INTRAVENOUS; SUBCUTANEOUS
Status: DISCONTINUED
Start: 2020-10-01 | End: 2020-10-01 | Stop reason: WASHOUT

## 2020-10-01 RX ORDER — HYDROCODONE BITARTRATE AND ACETAMINOPHEN 5; 325 MG/1; MG/1
2 TABLET ORAL AS NEEDED
Status: COMPLETED | OUTPATIENT
Start: 2020-10-01 | End: 2020-10-01

## 2020-10-01 RX ORDER — LIDOCAINE HYDROCHLORIDE AND EPINEPHRINE 10; 10 MG/ML; UG/ML
INJECTION, SOLUTION INFILTRATION; PERINEURAL AS NEEDED
Status: DISCONTINUED | OUTPATIENT
Start: 2020-10-01 | End: 2020-10-01 | Stop reason: HOSPADM

## 2020-10-01 RX ORDER — ONDANSETRON 2 MG/ML
INJECTION INTRAMUSCULAR; INTRAVENOUS AS NEEDED
Status: DISCONTINUED | OUTPATIENT
Start: 2020-10-01 | End: 2020-10-01 | Stop reason: SURG

## 2020-10-01 RX ORDER — METOCLOPRAMIDE HYDROCHLORIDE 5 MG/ML
10 INJECTION INTRAMUSCULAR; INTRAVENOUS AS NEEDED
Status: DISCONTINUED | OUTPATIENT
Start: 2020-10-01 | End: 2020-10-01

## 2020-10-01 RX ORDER — ACETAMINOPHEN 500 MG
1000 TABLET ORAL ONCE
Status: DISCONTINUED | OUTPATIENT
Start: 2020-10-01 | End: 2020-10-01 | Stop reason: HOSPADM

## 2020-10-01 RX ORDER — MEPERIDINE HYDROCHLORIDE 25 MG/ML
12.5 INJECTION INTRAMUSCULAR; INTRAVENOUS; SUBCUTANEOUS AS NEEDED
Status: DISCONTINUED | OUTPATIENT
Start: 2020-10-01 | End: 2020-10-01

## 2020-10-01 RX ORDER — CEFADROXIL 500 MG/1
500 CAPSULE ORAL 2 TIMES DAILY
Qty: 14 CAPSULE | Refills: 0 | Status: SHIPPED | OUTPATIENT
Start: 2020-10-01 | End: 2020-10-08

## 2020-10-01 RX ORDER — INSULIN ASPART 100 [IU]/ML
INJECTION, SOLUTION INTRAVENOUS; SUBCUTANEOUS ONCE
Status: DISCONTINUED | OUTPATIENT
Start: 2020-10-01 | End: 2020-10-01

## 2020-10-01 RX ORDER — LIDOCAINE HYDROCHLORIDE 10 MG/ML
INJECTION, SOLUTION EPIDURAL; INFILTRATION; INTRACAUDAL; PERINEURAL AS NEEDED
Status: DISCONTINUED | OUTPATIENT
Start: 2020-10-01 | End: 2020-10-01 | Stop reason: SURG

## 2020-10-01 RX ADMIN — DEXAMETHASONE SODIUM PHOSPHATE 4 MG: 4 MG/ML VIAL (ML) INJECTION at 07:49:00

## 2020-10-01 RX ADMIN — LIDOCAINE HYDROCHLORIDE 50 MG: 10 INJECTION, SOLUTION EPIDURAL; INFILTRATION; INTRACAUDAL; PERINEURAL at 07:32:00

## 2020-10-01 RX ADMIN — ONDANSETRON 4 MG: 2 INJECTION INTRAMUSCULAR; INTRAVENOUS at 08:39:00

## 2020-10-01 RX ADMIN — MIDAZOLAM HYDROCHLORIDE 2 MG: 1 INJECTION INTRAMUSCULAR; INTRAVENOUS at 07:32:00

## 2020-10-01 RX ADMIN — ROCURONIUM BROMIDE 50 MG: 10 INJECTION, SOLUTION INTRAVENOUS at 07:32:00

## 2020-10-01 RX ADMIN — CEFAZOLIN SODIUM/WATER 2 G: 2 G/20 ML SYRINGE (ML) INTRAVENOUS at 07:45:00

## 2020-10-01 RX ADMIN — SODIUM CHLORIDE, SODIUM LACTATE, POTASSIUM CHLORIDE, CALCIUM CHLORIDE: 600; 310; 30; 20 INJECTION, SOLUTION INTRAVENOUS at 08:52:00

## 2020-10-01 NOTE — ANESTHESIA PROCEDURE NOTES
Airway  Date/Time: 10/1/2020 7:36 AM  Urgency: elective    Airway not difficult    General Information and Staff    Patient location during procedure: OR  Anesthesiologist: Gabriella Huitron MD  Resident/CRNA: Eloise Garcia CRNA  Performed: Gurjit Gonsalez

## 2020-10-01 NOTE — INTERVAL H&P NOTE
Pre-op Diagnosis: RIGHT NECK MASS 5.0CM    The above referenced H&P was reviewed by Kirti Valdovinos MD on 10/1/2020, the patient was examined and no significant changes have occurred in the patient's condition since the H&P was performed.   I discussed w

## 2020-10-01 NOTE — ANESTHESIA PREPROCEDURE EVALUATION
PRE-OP EVALUATION    Patient Name: Mayte Bashir    Pre-op Diagnosis: RIGHT NECK MASS 5.0CM    Procedure(s):  EXCISION RIGHT NECK MASS-DEEP    Surgeon(s) and Role:     * Lary Marks MD - Primary     * Doni Camejo MD    Pre-op bella Scott GI/hepatic/renal ROS.                              Cardiovascular    Negative cardiovascular ROS.                 (+) hyperlipidemia                                  Endo/Other    Negative endo/other ROS.  (+) diabetes  type 2, using insulin documented in the signed consent form.         Plan/risks discussed with: patient                Present on Admission:  **None**

## 2020-10-01 NOTE — ANESTHESIA POSTPROCEDURE EVALUATION
[facility] Patient Status:  Hospital Outpatient Surgery   Age/Gender 50year old male MRN [id number]   Location [facility] Attending Austin Dalton MD   Hosp Day # 0 PCP Benjamin Stokes MD

## 2020-10-02 NOTE — OPERATIVE REPORT
[facility]    PATIENT'S NAME: Rohan Peraza   ATTENDING PHYSICIAN: Severo Kalata, M.D. OPERATING PHYSICIAN: Severo Kalata, M.D.    PATIENT ACCOUNT#:   [de-identified]    LOCATION:  [address]  MEDICAL RECORD #:   [id number] of the neck mass and there were none. The deep layers were closed with 3-0 Vicryl buried interrupted sutures, then a running subcuticular 4-0 Prolene stitch along with Steri-Strips and a sterile dressing.   Patient was woken, extubated, and transported to

## 2020-10-10 DIAGNOSIS — E78.2 MIXED HYPERLIPIDEMIA: ICD-10-CM

## 2020-10-12 RX ORDER — ATORVASTATIN CALCIUM 10 MG/1
TABLET, FILM COATED ORAL
Qty: 90 TABLET | Refills: 1 | Status: SHIPPED | OUTPATIENT
Start: 2020-10-12 | End: 2021-04-08

## 2020-10-12 NOTE — TELEPHONE ENCOUNTER
Failed protocol     Last refill:  3/18/2020 Atorvastatin 10 mg #90 1R    Last lipid 6/14/19 - lipid ordered on 2/8/2020 not complete    LOV:   7/24/2020 Dr Melissa Banda RTC 3-6 months  No FOV scheduled

## 2020-10-21 DIAGNOSIS — E11.9 TYPE 2 DIABETES MELLITUS WITHOUT COMPLICATION, WITH LONG-TERM CURRENT USE OF INSULIN (HCC): Chronic | ICD-10-CM

## 2020-10-21 DIAGNOSIS — Z79.4 TYPE 2 DIABETES MELLITUS WITHOUT COMPLICATION, WITH LONG-TERM CURRENT USE OF INSULIN (HCC): Chronic | ICD-10-CM

## 2020-10-21 NOTE — TELEPHONE ENCOUNTER
No protocol     Last refill:  5/15/2020 Insulin detemir 100 unit/ #9 2R    LOV:   7/24/2020 Dr Nuha Delcid RTC 3-6 months  No FOV scheduled

## 2021-01-01 ENCOUNTER — MED REC SCAN ONLY (OUTPATIENT)
Dept: INTERNAL MEDICINE CLINIC | Facility: CLINIC | Age: 56
End: 2021-01-01

## 2021-01-08 DIAGNOSIS — E11.9 TYPE 2 DIABETES MELLITUS WITHOUT COMPLICATION, WITH LONG-TERM CURRENT USE OF INSULIN (HCC): Chronic | ICD-10-CM

## 2021-01-08 DIAGNOSIS — Z79.4 TYPE 2 DIABETES MELLITUS WITHOUT COMPLICATION, WITH LONG-TERM CURRENT USE OF INSULIN (HCC): Chronic | ICD-10-CM

## 2021-01-11 RX ORDER — CLOTRIMAZOLE AND BETAMETHASONE DIPROPIONATE 10; .64 MG/G; MG/G
1 CREAM TOPICAL 2 TIMES DAILY PRN
Qty: 45 G | Refills: 2 | Status: SHIPPED | OUTPATIENT
Start: 2021-01-11 | End: 2021-02-26

## 2021-01-11 RX ORDER — PEN NEEDLE, DIABETIC 32GX 5/32"
NEEDLE, DISPOSABLE MISCELLANEOUS
Qty: 100 EACH | Refills: 3 | Status: SHIPPED | OUTPATIENT
Start: 2021-01-11 | End: 2021-03-25

## 2021-01-11 NOTE — TELEPHONE ENCOUNTER
Passed protocol - needles  Failed protocol - clotrimazole     Last refill:  6/26/2020 BD Pen Needle #100 3R  7/24/2020 Clotrimazole 45G 2R    LOV:   7/24/2020 Dr Marcie Blas RTC 3-6 months  No FOV scheduled

## 2021-02-26 ENCOUNTER — OFFICE VISIT (OUTPATIENT)
Dept: INTERNAL MEDICINE CLINIC | Facility: CLINIC | Age: 56
End: 2021-02-26
Payer: COMMERCIAL

## 2021-02-26 VITALS
WEIGHT: 230.63 LBS | HEIGHT: 72 IN | OXYGEN SATURATION: 98 % | HEART RATE: 85 BPM | RESPIRATION RATE: 16 BRPM | TEMPERATURE: 99 F | DIASTOLIC BLOOD PRESSURE: 80 MMHG | SYSTOLIC BLOOD PRESSURE: 118 MMHG | BODY MASS INDEX: 31.24 KG/M2

## 2021-02-26 DIAGNOSIS — Z00.00 PREVENTATIVE HEALTH CARE: ICD-10-CM

## 2021-02-26 DIAGNOSIS — E78.2 MIXED HYPERLIPIDEMIA: Chronic | ICD-10-CM

## 2021-02-26 DIAGNOSIS — E11.9 TYPE 2 DIABETES MELLITUS WITHOUT COMPLICATION, WITH LONG-TERM CURRENT USE OF INSULIN (HCC): Primary | Chronic | ICD-10-CM

## 2021-02-26 DIAGNOSIS — N48.89 PENILE IRRITATION: ICD-10-CM

## 2021-02-26 DIAGNOSIS — Z12.5 SCREENING FOR MALIGNANT NEOPLASM OF PROSTATE: ICD-10-CM

## 2021-02-26 DIAGNOSIS — E55.9 VITAMIN D DEFICIENCY: ICD-10-CM

## 2021-02-26 DIAGNOSIS — Z79.4 TYPE 2 DIABETES MELLITUS WITHOUT COMPLICATION, WITH LONG-TERM CURRENT USE OF INSULIN (HCC): Primary | Chronic | ICD-10-CM

## 2021-02-26 LAB
CARTRIDGE LOT#: 349 NUMERIC
HEMOGLOBIN A1C: 8.7 % (ref 4.3–5.6)

## 2021-02-26 PROCEDURE — 83036 HEMOGLOBIN GLYCOSYLATED A1C: CPT | Performed by: INTERNAL MEDICINE

## 2021-02-26 PROCEDURE — 3079F DIAST BP 80-89 MM HG: CPT | Performed by: INTERNAL MEDICINE

## 2021-02-26 PROCEDURE — 3052F HG A1C>EQUAL 8.0%<EQUAL 9.0%: CPT | Performed by: INTERNAL MEDICINE

## 2021-02-26 PROCEDURE — 99214 OFFICE O/P EST MOD 30 MIN: CPT | Performed by: INTERNAL MEDICINE

## 2021-02-26 PROCEDURE — 3008F BODY MASS INDEX DOCD: CPT | Performed by: INTERNAL MEDICINE

## 2021-02-26 PROCEDURE — 3074F SYST BP LT 130 MM HG: CPT | Performed by: INTERNAL MEDICINE

## 2021-02-26 RX ORDER — CLOTRIMAZOLE AND BETAMETHASONE DIPROPIONATE 10; .64 MG/G; MG/G
1 CREAM TOPICAL 2 TIMES DAILY PRN
Qty: 45 G | Refills: 0 | Status: SHIPPED | OUTPATIENT
Start: 2021-02-26 | End: 2021-08-25

## 2021-02-26 NOTE — PROGRESS NOTES
Bobbi Gonsalez is a 50year old male. HPI:   Patient presents with: Follow - Up    Patient presents for follow up on chronic medical issues. DM II - AM fasting readings are good, low 100's per patient.   However later on in the day blood sugar i daily, Disp: 100 each, Rfl: 3  •  ATORVASTATIN 10 MG Oral Tab, TAKE 1 TABLET BY MOUTH EVERY NIGHT AT BEDTIME, Disp: 90 tablet, Rfl: 1  Medical:  has a past medical history of Alcohol withdrawal syndrome with complication ([facility] Alcoholic pancreatitis (81/1 complication, with long-term current use of insulin (HCC)  Uncontrolled, A1c now up to 8.7. Will add Januvia 100 mg every day. Continue Levemir 18 units BID. Will hold off on SGLT-2 inhibitors in light of his balanitis issues. Foot exam done today.   Mi Bethany Win MD

## 2021-02-26 NOTE — PATIENT INSTRUCTIONS
- A1c is still elevated (8.7). Continue insulin at same dose for now. We will also start you on a daily medication, Januvia. Take 1 tablet daily  - Follow up with me in 3 months.   If A1c is still high at that point, we may refer you to our diabetes spec

## 2021-03-25 DIAGNOSIS — Z79.4 TYPE 2 DIABETES MELLITUS WITHOUT COMPLICATION, WITH LONG-TERM CURRENT USE OF INSULIN (HCC): Chronic | ICD-10-CM

## 2021-03-25 DIAGNOSIS — E11.9 TYPE 2 DIABETES MELLITUS WITHOUT COMPLICATION, WITH LONG-TERM CURRENT USE OF INSULIN (HCC): Chronic | ICD-10-CM

## 2021-03-26 RX ORDER — PEN NEEDLE, DIABETIC 32GX 5/32"
NEEDLE, DISPOSABLE MISCELLANEOUS
Qty: 100 EACH | Refills: 3 | Status: SHIPPED | OUTPATIENT
Start: 2021-03-26 | End: 2021-07-23

## 2021-03-26 NOTE — TELEPHONE ENCOUNTER
Passed protocol     Last refill:  1/11/2021 BD Pen Needle #100 each 3R    LOV:   2/26/2021 Dr Bassam Jordan RTC 3 months  1. Type 2 diabetes mellitus without complication, with long-term current use of insulin (HCC)  Uncontrolled, A1c now up to 8.7.   Will add Malina Navas

## 2021-04-03 ENCOUNTER — IMMUNIZATION (OUTPATIENT)
Dept: LAB | Age: 56
End: 2021-04-03
Attending: HOSPITALIST
Payer: COMMERCIAL

## 2021-04-03 DIAGNOSIS — Z23 NEED FOR VACCINATION: Primary | ICD-10-CM

## 2021-04-03 PROCEDURE — 0001A SARSCOV2 VAC 30MCG/0.3ML IM: CPT

## 2021-04-08 DIAGNOSIS — E78.2 MIXED HYPERLIPIDEMIA: ICD-10-CM

## 2021-04-08 RX ORDER — ATORVASTATIN CALCIUM 10 MG/1
10 TABLET, FILM COATED ORAL NIGHTLY
Qty: 90 TABLET | Refills: 1 | Status: SHIPPED | OUTPATIENT
Start: 2021-04-08 | End: 2021-07-16

## 2021-04-08 NOTE — TELEPHONE ENCOUNTER
No protocol     Last refill:  10/12/2020 Atorvastatin 10 mg #90 1R    Last lipid 6/14/2019 (labs ordered on 2/26/2021)     LOV:   2/26/2021 Dr Manjinder Dc RTC 3 months  2. Mixed hyperlipidemia  Will check lipid panel. Continue atorvastatin 10 mg qhs for now.

## 2021-04-24 ENCOUNTER — IMMUNIZATION (OUTPATIENT)
Dept: LAB | Age: 56
End: 2021-04-24
Attending: HOSPITALIST
Payer: COMMERCIAL

## 2021-04-24 DIAGNOSIS — Z23 NEED FOR VACCINATION: Primary | ICD-10-CM

## 2021-04-24 PROCEDURE — 0002A SARSCOV2 VAC 30MCG/0.3ML IM: CPT

## 2021-05-13 ENCOUNTER — TELEPHONE (OUTPATIENT)
Dept: INTERNAL MEDICINE CLINIC | Facility: CLINIC | Age: 56
End: 2021-05-13

## 2021-05-15 DIAGNOSIS — E11.9 TYPE 2 DIABETES MELLITUS WITHOUT COMPLICATION, WITH LONG-TERM CURRENT USE OF INSULIN (HCC): Chronic | ICD-10-CM

## 2021-05-15 DIAGNOSIS — Z79.4 TYPE 2 DIABETES MELLITUS WITHOUT COMPLICATION, WITH LONG-TERM CURRENT USE OF INSULIN (HCC): Chronic | ICD-10-CM

## 2021-05-17 RX ORDER — SITAGLIPTIN 100 MG/1
TABLET, FILM COATED ORAL
Qty: 90 TABLET | Refills: 0 | Status: SHIPPED | OUTPATIENT
Start: 2021-05-17 | End: 2021-08-06

## 2021-05-17 NOTE — TELEPHONE ENCOUNTER
Medication(s) to Refill:   Requested Prescriptions     Pending Prescriptions Disp Refills   • JANUVIA 100 MG Oral Tab [Pharmacy Med Name: JANUVIA 100MG TABLETS] 90 tablet 0     Sig: TAKE 1 TABLET(100 MG) BY MOUTH DAILY       LOV: 2-  RTC: 3 months

## 2021-06-15 DIAGNOSIS — E11.9 TYPE 2 DIABETES MELLITUS WITHOUT COMPLICATION, WITH LONG-TERM CURRENT USE OF INSULIN (HCC): Chronic | ICD-10-CM

## 2021-06-15 DIAGNOSIS — Z79.4 TYPE 2 DIABETES MELLITUS WITHOUT COMPLICATION, WITH LONG-TERM CURRENT USE OF INSULIN (HCC): Chronic | ICD-10-CM

## 2021-06-16 NOTE — TELEPHONE ENCOUNTER
Requesting insulin detemir 100 UNIT/ML Subcutaneous Solution Pen-injector  LOV: 2/26/21  RTC: 3 months  Last Relevant Labs: 2/26/21  Filled: 2/26/21 #12 mL with 3 refills    No future appointments.

## 2021-06-25 NOTE — TELEPHONE ENCOUNTER
From: Bruce Thao  To: Pema Aj MD  Sent: 9/13/2019 3:53 PM CDT  Subject: Other    Hi Doctor, why my prescription to insulin was send to [facility] Counts include 234 beds at the [facility] in [place] and not on my regular pharmacy in [place] [address] 44.2

## 2021-07-09 ENCOUNTER — LAB ENCOUNTER (OUTPATIENT)
Dept: LAB | Age: 56
End: 2021-07-09
Attending: INTERNAL MEDICINE
Payer: COMMERCIAL

## 2021-07-09 DIAGNOSIS — Z79.4 TYPE 2 DIABETES MELLITUS WITHOUT COMPLICATION, WITH LONG-TERM CURRENT USE OF INSULIN (HCC): Chronic | ICD-10-CM

## 2021-07-09 DIAGNOSIS — Z00.00 PREVENTATIVE HEALTH CARE: ICD-10-CM

## 2021-07-09 DIAGNOSIS — Z12.5 SCREENING FOR MALIGNANT NEOPLASM OF PROSTATE: ICD-10-CM

## 2021-07-09 DIAGNOSIS — E11.9 TYPE 2 DIABETES MELLITUS WITHOUT COMPLICATION, WITH LONG-TERM CURRENT USE OF INSULIN (HCC): Chronic | ICD-10-CM

## 2021-07-09 DIAGNOSIS — E55.9 VITAMIN D DEFICIENCY: ICD-10-CM

## 2021-07-09 DIAGNOSIS — E78.2 MIXED HYPERLIPIDEMIA: Chronic | ICD-10-CM

## 2021-07-09 LAB
ALBUMIN SERPL-MCNC: 3.9 G/DL (ref 3.4–5)
ALBUMIN/GLOB SERPL: 1.1 {RATIO} (ref 1–2)
ALP LIVER SERPL-CCNC: 86 U/L
ALT SERPL-CCNC: 51 U/L
ANION GAP SERPL CALC-SCNC: 6 MMOL/L (ref 0–18)
AST SERPL-CCNC: 22 U/L (ref 15–37)
BASOPHILS # BLD AUTO: 0.06 X10(3) UL (ref 0–0.2)
BASOPHILS NFR BLD AUTO: 0.7 %
BILIRUB SERPL-MCNC: 0.6 MG/DL (ref 0.1–2)
BUN BLD-MCNC: 16 MG/DL (ref 7–18)
BUN/CREAT SERPL: 18.8 (ref 10–20)
CALCIUM BLD-MCNC: 8.8 MG/DL (ref 8.5–10.1)
CHLORIDE SERPL-SCNC: 108 MMOL/L (ref 98–112)
CHOLEST SMN-MCNC: 178 MG/DL (ref ?–200)
CO2 SERPL-SCNC: 22 MMOL/L (ref 21–32)
COMPLEXED PSA SERPL-MCNC: 0.96 NG/ML (ref ?–4)
CREAT BLD-MCNC: 0.85 MG/DL
CREAT UR-SCNC: 120 MG/DL
DEPRECATED RDW RBC AUTO: 42.5 FL (ref 35.1–46.3)
EOSINOPHIL # BLD AUTO: 0.24 X10(3) UL (ref 0–0.7)
EOSINOPHIL NFR BLD AUTO: 2.7 %
ERYTHROCYTE [DISTWIDTH] IN BLOOD BY AUTOMATED COUNT: 13.1 % (ref 11–15)
GLOBULIN PLAS-MCNC: 3.4 G/DL (ref 2.8–4.4)
GLUCOSE BLD-MCNC: 285 MG/DL (ref 70–99)
HCT VFR BLD AUTO: 48.1 %
HDLC SERPL-MCNC: 38 MG/DL (ref 40–59)
HGB BLD-MCNC: 15.6 G/DL
IMM GRANULOCYTES # BLD AUTO: 0.07 X10(3) UL (ref 0–1)
IMM GRANULOCYTES NFR BLD: 0.8 %
LDLC SERPL CALC-MCNC: 110 MG/DL (ref ?–100)
LYMPHOCYTES # BLD AUTO: 2.1 X10(3) UL (ref 1–4)
LYMPHOCYTES NFR BLD AUTO: 23.7 %
M PROTEIN MFR SERPL ELPH: 7.3 G/DL (ref 6.4–8.2)
MCH RBC QN AUTO: 28.9 PG (ref 26–34)
MCHC RBC AUTO-ENTMCNC: 32.4 G/DL (ref 31–37)
MCV RBC AUTO: 89.2 FL
MICROALBUMIN UR-MCNC: 1.03 MG/DL
MICROALBUMIN/CREAT 24H UR-RTO: 8.6 UG/MG (ref ?–30)
MONOCYTES # BLD AUTO: 0.52 X10(3) UL (ref 0.1–1)
MONOCYTES NFR BLD AUTO: 5.9 %
NEUTROPHILS # BLD AUTO: 5.88 X10 (3) UL (ref 1.5–7.7)
NEUTROPHILS # BLD AUTO: 5.88 X10(3) UL (ref 1.5–7.7)
NEUTROPHILS NFR BLD AUTO: 66.2 %
NONHDLC SERPL-MCNC: 140 MG/DL (ref ?–130)
OSMOLALITY SERPL CALC.SUM OF ELEC: 294 MOSM/KG (ref 275–295)
PATIENT FASTING Y/N/NP: YES
PATIENT FASTING Y/N/NP: YES
PLATELET # BLD AUTO: 243 10(3)UL (ref 150–450)
POTASSIUM SERPL-SCNC: 4.1 MMOL/L (ref 3.5–5.1)
RBC # BLD AUTO: 5.39 X10(6)UL
SODIUM SERPL-SCNC: 136 MMOL/L (ref 136–145)
TRIGL SERPL-MCNC: 168 MG/DL (ref 30–149)
TSI SER-ACNC: 0.51 MIU/ML (ref 0.36–3.74)
VIT D+METAB SERPL-MCNC: 18.9 NG/ML (ref 30–100)
VLDLC SERPL CALC-MCNC: 29 MG/DL (ref 0–30)
WBC # BLD AUTO: 8.9 X10(3) UL (ref 4–11)

## 2021-07-09 PROCEDURE — 82306 VITAMIN D 25 HYDROXY: CPT | Performed by: INTERNAL MEDICINE

## 2021-07-09 PROCEDURE — 84153 ASSAY OF PSA TOTAL: CPT | Performed by: INTERNAL MEDICINE

## 2021-07-09 PROCEDURE — 82043 UR ALBUMIN QUANTITATIVE: CPT | Performed by: INTERNAL MEDICINE

## 2021-07-09 PROCEDURE — 3061F NEG MICROALBUMINURIA REV: CPT | Performed by: INTERNAL MEDICINE

## 2021-07-09 PROCEDURE — 82570 ASSAY OF URINE CREATININE: CPT | Performed by: INTERNAL MEDICINE

## 2021-07-09 PROCEDURE — 80061 LIPID PANEL: CPT | Performed by: INTERNAL MEDICINE

## 2021-07-09 PROCEDURE — 80050 GENERAL HEALTH PANEL: CPT | Performed by: INTERNAL MEDICINE

## 2021-07-16 ENCOUNTER — OFFICE VISIT (OUTPATIENT)
Dept: INTERNAL MEDICINE CLINIC | Facility: CLINIC | Age: 56
End: 2021-07-16
Payer: COMMERCIAL

## 2021-07-16 VITALS
HEART RATE: 80 BPM | RESPIRATION RATE: 16 BRPM | BODY MASS INDEX: 31.94 KG/M2 | HEIGHT: 72 IN | WEIGHT: 235.81 LBS | TEMPERATURE: 98 F | DIASTOLIC BLOOD PRESSURE: 64 MMHG | SYSTOLIC BLOOD PRESSURE: 104 MMHG | OXYGEN SATURATION: 98 %

## 2021-07-16 DIAGNOSIS — E55.9 VITAMIN D DEFICIENCY: ICD-10-CM

## 2021-07-16 DIAGNOSIS — E78.2 MIXED HYPERLIPIDEMIA: Chronic | ICD-10-CM

## 2021-07-16 DIAGNOSIS — Z79.4 TYPE 2 DIABETES MELLITUS WITHOUT COMPLICATION, WITH LONG-TERM CURRENT USE OF INSULIN (HCC): Primary | Chronic | ICD-10-CM

## 2021-07-16 DIAGNOSIS — E11.9 TYPE 2 DIABETES MELLITUS WITHOUT COMPLICATION, WITH LONG-TERM CURRENT USE OF INSULIN (HCC): Primary | Chronic | ICD-10-CM

## 2021-07-16 DIAGNOSIS — N52.9 ERECTILE DYSFUNCTION, UNSPECIFIED ERECTILE DYSFUNCTION TYPE: ICD-10-CM

## 2021-07-16 LAB
CARTRIDGE EXPIRATION DATE: ABNORMAL DATE
CARTRIDGE LOT#: 788 NUMERIC
HEMOGLOBIN A1C: 9.9 % (ref 4.3–5.6)

## 2021-07-16 PROCEDURE — 3008F BODY MASS INDEX DOCD: CPT | Performed by: INTERNAL MEDICINE

## 2021-07-16 PROCEDURE — 3046F HEMOGLOBIN A1C LEVEL >9.0%: CPT | Performed by: INTERNAL MEDICINE

## 2021-07-16 PROCEDURE — 83036 HEMOGLOBIN GLYCOSYLATED A1C: CPT | Performed by: INTERNAL MEDICINE

## 2021-07-16 PROCEDURE — 99214 OFFICE O/P EST MOD 30 MIN: CPT | Performed by: INTERNAL MEDICINE

## 2021-07-16 RX ORDER — SILDENAFIL 100 MG/1
100 TABLET, FILM COATED ORAL AS NEEDED
Qty: 30 TABLET | Refills: 11 | Status: SHIPPED | OUTPATIENT
Start: 2021-07-16 | End: 2022-01-12

## 2021-07-16 RX ORDER — ATORVASTATIN CALCIUM 20 MG/1
20 TABLET, FILM COATED ORAL NIGHTLY
Qty: 90 TABLET | Refills: 1 | Status: SHIPPED | OUTPATIENT
Start: 2021-07-16 | End: 2021-12-16

## 2021-07-16 RX ORDER — ERGOCALCIFEROL 1.25 MG/1
50000 CAPSULE ORAL WEEKLY
Qty: 12 CAPSULE | Refills: 1 | Status: SHIPPED | OUTPATIENT
Start: 2021-07-16 | End: 2021-11-29

## 2021-07-16 NOTE — PROGRESS NOTES
Hakeem Bowman is a 49year old male. HPI:   Patient presents with: Follow - Up    Patient presents for follow up on chronic medical issues. DM II - higher fasting glucose readings. Hyperlipidemia - on statin therapy, tolerating.   Vitamin D d virus infection (4/23/2020), Iron deficiency anemia, unspecified (12/21/2018), Normocytic anemia, Sinus tachycardia, and Thrombocytopenia ([facility] Surgical:  has a past surgical history that includes vasectomy and cystoscopy,insert ureteral stent.   Family: tablet; Refill: 1    3. Vitamin D deficiency  Deficiency range. He has been working 13-14 hour days at work - not getting a lot of natural sunlight. Will send in prescription for weekly supplementation. - ergocalciferol 1.25 MG (68059 UT) Oral Cap;  Take

## 2021-07-16 NOTE — PATIENT INSTRUCTIONS
- We will increase your cholesterol medication, atorvastatin, dose to 20 mg.   - Vitamin D is low. Weekly prescription sent to [address] [place] [address] prescription sent to [facility].   - Diabetes/blood sugar has gone higher - we

## 2021-07-23 DIAGNOSIS — E11.9 TYPE 2 DIABETES MELLITUS WITHOUT COMPLICATION, WITH LONG-TERM CURRENT USE OF INSULIN (HCC): Chronic | ICD-10-CM

## 2021-07-23 DIAGNOSIS — Z79.4 TYPE 2 DIABETES MELLITUS WITHOUT COMPLICATION, WITH LONG-TERM CURRENT USE OF INSULIN (HCC): Chronic | ICD-10-CM

## 2021-07-23 RX ORDER — PEN NEEDLE, DIABETIC 32GX 5/32"
NEEDLE, DISPOSABLE MISCELLANEOUS
Qty: 100 EACH | Refills: 3 | Status: SHIPPED | OUTPATIENT
Start: 2021-07-23 | End: 2021-09-13

## 2021-07-23 NOTE — TELEPHONE ENCOUNTER
Passed protocol      Requesting BD PEN NEEDLE SHA U/F 32G X 4 MM  LOV: 7/16/21  RTC: 6 months  Last Relevant Labs: 7/9/21  Filled: 3/26/21 #100 with 3 refills    No future appointments.

## 2021-08-06 DIAGNOSIS — E11.9 TYPE 2 DIABETES MELLITUS WITHOUT COMPLICATION, WITH LONG-TERM CURRENT USE OF INSULIN (HCC): Chronic | ICD-10-CM

## 2021-08-06 DIAGNOSIS — Z79.4 TYPE 2 DIABETES MELLITUS WITHOUT COMPLICATION, WITH LONG-TERM CURRENT USE OF INSULIN (HCC): Chronic | ICD-10-CM

## 2021-08-06 NOTE — TELEPHONE ENCOUNTER
Failed protocol     Last refill:  5/17/2021 Januvia 100 mg #90 NR    LOV:   7/16/2021 Dr Braxton Huang RTC 6 months  1.  Type 2 diabetes mellitus without complication, with long-term current use of insulin (HCC)  Uncontrolled - A1c now up to 9.9% today in the off

## 2021-08-25 DIAGNOSIS — N48.89 PENILE IRRITATION: ICD-10-CM

## 2021-08-26 RX ORDER — CLOTRIMAZOLE AND BETAMETHASONE DIPROPIONATE 10; .64 MG/G; MG/G
1 CREAM TOPICAL 2 TIMES DAILY PRN
Qty: 45 G | Refills: 0 | Status: SHIPPED | OUTPATIENT
Start: 2021-08-26 | End: 2021-12-17

## 2021-08-26 NOTE — TELEPHONE ENCOUNTER
No protocol     Last refill:  clotrimazole-betamethasone 1-0.05 % External Cream 45 g 0 2/26/2021    Sig:   Apply 1 Application topically 2 (two) times daily as needed.        LOV:   7/16/2021 Dr Di Gallardo RTC 6 months   No FOV scheduled

## 2021-09-09 DIAGNOSIS — E11.9 TYPE 2 DIABETES MELLITUS WITHOUT COMPLICATION, WITH LONG-TERM CURRENT USE OF INSULIN (HCC): Chronic | ICD-10-CM

## 2021-09-09 DIAGNOSIS — Z79.4 TYPE 2 DIABETES MELLITUS WITHOUT COMPLICATION, WITH LONG-TERM CURRENT USE OF INSULIN (HCC): Chronic | ICD-10-CM

## 2021-09-09 RX ORDER — INSULIN DETEMIR 100 [IU]/ML
18 INJECTION, SOLUTION SUBCUTANEOUS 2 TIMES DAILY
Qty: 12 ML | Refills: 3 | Status: SHIPPED | OUTPATIENT
Start: 2021-09-09 | End: 2021-12-15 | Stop reason: ALTCHOICE

## 2021-09-09 NOTE — TELEPHONE ENCOUNTER
insulin detemir 100 UNIT/ML Subcutaneous Solution Pen-injector 12 mL 3 6/16/2021    Sig:   Inject 18 Units into the skin every 12 (twelve) hours. LOV:   7/16/2021 Dr Yu [facility] RTC 6 months  1.  Type 2 diabetes mellitus without complication, with long-te

## 2021-09-13 DIAGNOSIS — Z79.4 TYPE 2 DIABETES MELLITUS WITHOUT COMPLICATION, WITH LONG-TERM CURRENT USE OF INSULIN (HCC): Chronic | ICD-10-CM

## 2021-09-13 DIAGNOSIS — E11.9 TYPE 2 DIABETES MELLITUS WITHOUT COMPLICATION, WITH LONG-TERM CURRENT USE OF INSULIN (HCC): Chronic | ICD-10-CM

## 2021-09-14 RX ORDER — PEN NEEDLE, DIABETIC 32GX 5/32"
NEEDLE, DISPOSABLE MISCELLANEOUS
Qty: 100 EACH | Refills: 3 | Status: SHIPPED | OUTPATIENT
Start: 2021-09-14 | End: 2021-12-15

## 2021-09-14 NOTE — TELEPHONE ENCOUNTER
BD PEN NEEDLE SHA U/F 32G X 4 MM Does not apply Misc          Sig: Uses 2 x daily    Disp:  100 each    Refills:  3    NATHAN     Start: 9/13/2021    Class: Normal    Non-formulary For: Type 2 diabetes mellitus without complication, with long-term current us

## 2021-09-30 ENCOUNTER — PATIENT MESSAGE (OUTPATIENT)
Dept: INTERNAL MEDICINE CLINIC | Facility: CLINIC | Age: 56
End: 2021-09-30

## 2021-09-30 NOTE — TELEPHONE ENCOUNTER
From: Doris Ken  To: Viri Harris MD  Sent: 9/30/2021 12:08 PM CDT  Subject: Prashant's glucose level. Hi Doctor, I send this message because just right I checked my glucose and I have 475.  On the morning around 6am I checked too and it wa

## 2021-09-30 NOTE — TELEPHONE ENCOUNTER
Patient reporting elevated blood sugar reading. Asymptomatic. No available appointments until 10/7/2021-- Are you able to accommodate an earlier appointment for patient?

## 2021-09-30 NOTE — TELEPHONE ENCOUNTER
Future Appointments   Date Time Provider Ahmet Thomson   10/2/2021 11:30 AM Myrna Roth MD [facility] [place]   12/15/2021  3:00 PM Mariela Preston APRN [id number] [place]

## 2021-10-02 ENCOUNTER — OFFICE VISIT (OUTPATIENT)
Dept: INTERNAL MEDICINE CLINIC | Facility: CLINIC | Age: 56
End: 2021-10-02
Payer: COMMERCIAL

## 2021-10-02 VITALS
SYSTOLIC BLOOD PRESSURE: 110 MMHG | HEIGHT: 72 IN | WEIGHT: 227 LBS | HEART RATE: 96 BPM | RESPIRATION RATE: 16 BRPM | DIASTOLIC BLOOD PRESSURE: 80 MMHG | BODY MASS INDEX: 30.75 KG/M2 | TEMPERATURE: 99 F

## 2021-10-02 DIAGNOSIS — E78.2 MIXED HYPERLIPIDEMIA: ICD-10-CM

## 2021-10-02 DIAGNOSIS — N48.89 PENILE IRRITATION: ICD-10-CM

## 2021-10-02 DIAGNOSIS — E11.9 TYPE 2 DIABETES MELLITUS WITHOUT COMPLICATION, WITH LONG-TERM CURRENT USE OF INSULIN (HCC): Primary | ICD-10-CM

## 2021-10-02 DIAGNOSIS — E55.9 VITAMIN D DEFICIENCY: ICD-10-CM

## 2021-10-02 DIAGNOSIS — Z23 NEED FOR IMMUNIZATION AGAINST INFLUENZA: ICD-10-CM

## 2021-10-02 DIAGNOSIS — Z79.4 TYPE 2 DIABETES MELLITUS WITHOUT COMPLICATION, WITH LONG-TERM CURRENT USE OF INSULIN (HCC): Primary | ICD-10-CM

## 2021-10-02 PROCEDURE — 90471 IMMUNIZATION ADMIN: CPT | Performed by: INTERNAL MEDICINE

## 2021-10-02 PROCEDURE — 90686 IIV4 VACC NO PRSV 0.5 ML IM: CPT | Performed by: INTERNAL MEDICINE

## 2021-10-02 PROCEDURE — 3074F SYST BP LT 130 MM HG: CPT | Performed by: INTERNAL MEDICINE

## 2021-10-02 PROCEDURE — 3008F BODY MASS INDEX DOCD: CPT | Performed by: INTERNAL MEDICINE

## 2021-10-02 PROCEDURE — 99214 OFFICE O/P EST MOD 30 MIN: CPT | Performed by: INTERNAL MEDICINE

## 2021-10-02 PROCEDURE — 3079F DIAST BP 80-89 MM HG: CPT | Performed by: INTERNAL MEDICINE

## 2021-10-02 NOTE — PATIENT INSTRUCTIONS
- Increase insulin dose to 25 units twice daily  - Send a [employer] message or call with updated blood sugar readings in two weeks  - Follow up with Urology for your skin tightness/irritation symptoms   Dr. Ulysses Wiley, Dr. Oliverio Hartley, or Raul Oneil

## 2021-10-02 NOTE — PROGRESS NOTES
Pedro Castañeda is a 49year old male.    HPI:   Patient presents with:  Diabetes: Pt worried about high glucose; was on vacation recently; took Sildenail on days that glucose was elevated; states now its better;   Skin Irritation: Pt c/o irritation Take 1 tablet (20 mg total) by mouth nightly., Disp: 90 tablet, Rfl: 1  •  ergocalciferol 1.25 MG (58091 UT) Oral Cap, Take 1 capsule (50,000 Units total) by mouth once a week., Disp: 12 capsule, Rfl: 1  Medical:  has a past medical history of Alcohol with in 2 weeks. Has appointment with diabetes clinic in December. Due for eye exam.  Microalbumin done in July. Foot exam done in February. On statin therapy. 2. Mixed hyperlipidemia  Will check lipid panel.   On atorvastatin 20 mg qhs.  - LIPID PANEL;

## 2021-11-27 DIAGNOSIS — E55.9 VITAMIN D DEFICIENCY: ICD-10-CM

## 2021-11-29 ENCOUNTER — IMMUNIZATION (OUTPATIENT)
Dept: LAB | Facility: HOSPITAL | Age: 56
End: 2021-11-29
Attending: EMERGENCY MEDICINE
Payer: COMMERCIAL

## 2021-11-29 DIAGNOSIS — Z23 NEED FOR VACCINATION: Primary | ICD-10-CM

## 2021-11-29 PROCEDURE — 0004A SARSCOV2 VAC 30MCG/0.3ML IM: CPT

## 2021-11-29 RX ORDER — ERGOCALCIFEROL 1.25 MG/1
50000 CAPSULE ORAL WEEKLY
Qty: 12 CAPSULE | Refills: 1 | Status: SHIPPED | OUTPATIENT
Start: 2021-11-29

## 2021-11-29 NOTE — TELEPHONE ENCOUNTER
No Protocol     Requesting: vitamin d2 17431    LOV: 10/2/21   4. Vitamin D deficiency  Deficiency range in July.   On prescription vitamin D.  RTC: 3-6 months   Filled: 7/16/21 #12 1 refill   Recent Labs: 7/9/21     Upcoming OV: 12/17/21

## 2021-12-15 ENCOUNTER — TELEPHONE (OUTPATIENT)
Dept: ENDOCRINOLOGY CLINIC | Facility: CLINIC | Age: 56
End: 2021-12-15

## 2021-12-15 ENCOUNTER — OFFICE VISIT (OUTPATIENT)
Dept: ENDOCRINOLOGY CLINIC | Facility: CLINIC | Age: 56
End: 2021-12-15
Payer: COMMERCIAL

## 2021-12-15 VITALS
BODY MASS INDEX: 32 KG/M2 | SYSTOLIC BLOOD PRESSURE: 104 MMHG | DIASTOLIC BLOOD PRESSURE: 60 MMHG | HEART RATE: 79 BPM | RESPIRATION RATE: 18 BRPM | OXYGEN SATURATION: 100 % | WEIGHT: 238.19 LBS

## 2021-12-15 DIAGNOSIS — E66.09 CLASS 1 OBESITY DUE TO EXCESS CALORIES WITH SERIOUS COMORBIDITY AND BODY MASS INDEX (BMI) OF 32.0 TO 32.9 IN ADULT: ICD-10-CM

## 2021-12-15 DIAGNOSIS — Z79.4 TYPE 2 DIABETES MELLITUS WITH HYPERGLYCEMIA, WITH LONG-TERM CURRENT USE OF INSULIN (HCC): Primary | ICD-10-CM

## 2021-12-15 DIAGNOSIS — E78.2 MIXED HYPERLIPIDEMIA: Chronic | ICD-10-CM

## 2021-12-15 DIAGNOSIS — E11.65 TYPE 2 DIABETES MELLITUS WITH HYPERGLYCEMIA, WITH LONG-TERM CURRENT USE OF INSULIN (HCC): Primary | ICD-10-CM

## 2021-12-15 DIAGNOSIS — E78.5 DYSLIPIDEMIA: ICD-10-CM

## 2021-12-15 PROCEDURE — 83036 HEMOGLOBIN GLYCOSYLATED A1C: CPT | Performed by: NURSE PRACTITIONER

## 2021-12-15 PROCEDURE — 3078F DIAST BP <80 MM HG: CPT | Performed by: NURSE PRACTITIONER

## 2021-12-15 PROCEDURE — 99215 OFFICE O/P EST HI 40 MIN: CPT | Performed by: NURSE PRACTITIONER

## 2021-12-15 PROCEDURE — 3046F HEMOGLOBIN A1C LEVEL >9.0%: CPT | Performed by: INTERNAL MEDICINE

## 2021-12-15 PROCEDURE — 82947 ASSAY GLUCOSE BLOOD QUANT: CPT | Performed by: NURSE PRACTITIONER

## 2021-12-15 PROCEDURE — 3074F SYST BP LT 130 MM HG: CPT | Performed by: NURSE PRACTITIONER

## 2021-12-15 RX ORDER — PEN NEEDLE, DIABETIC 32GX 5/32"
NEEDLE, DISPOSABLE MISCELLANEOUS
Qty: 500 EACH | Refills: 3 | Status: SHIPPED | OUTPATIENT
Start: 2021-12-15

## 2021-12-15 RX ORDER — INSULIN ASPART INJECTION 100 [IU]/ML
INJECTION, SOLUTION SUBCUTANEOUS
Qty: 30 ML | Refills: 0 | Status: SHIPPED | OUTPATIENT
Start: 2021-12-15

## 2021-12-15 RX ORDER — BLOOD-GLUCOSE SENSOR
1 EACH MISCELLANEOUS
Qty: 3 EACH | Refills: 12 | Status: SHIPPED | OUTPATIENT
Start: 2021-12-15

## 2021-12-15 RX ORDER — INSULIN GLARGINE 300 U/ML
INJECTION, SOLUTION SUBCUTANEOUS
Qty: 18 ML | Refills: 1 | Status: SHIPPED | OUTPATIENT
Start: 2021-12-15 | End: 2021-12-17

## 2021-12-15 RX ORDER — INSULIN GLARGINE 300 U/ML
INJECTION, SOLUTION SUBCUTANEOUS
Qty: 6 ML | Refills: 1 | Status: SHIPPED | OUTPATIENT
Start: 2021-12-15

## 2021-12-15 RX ORDER — BLOOD-GLUCOSE TRANSMITTER
EACH MISCELLANEOUS
Qty: 1 EACH | Refills: 3 | Status: SHIPPED | OUTPATIENT
Start: 2021-12-15

## 2021-12-15 NOTE — PATIENT INSTRUCTIONS
We are here to help you manage your diabetes.  Please continue with your primary care physician/provider for your routine health care maintenance     Your A1C: 13.4 % (last A1C 9.9%)   This is very high for you and we will continue to work together on low sites for your insulin. Your skin is showing changes that you have been overusing certain areas of the skin for your injections. After using the same areas over and over, the skin can develop fat scarring from the preservatives in insulin.  This build up sugar trends BEFORE CHANGING OR STOPPING ANY MEDICATIONS. Blood sugar testing:   Remember to bring your glucose meter or blood sugar logbook to every appointment here at the diabetes center.  This allows me to safely make adjustments to your diabetes pl for refills  · Contact your pharmacy at least 5 days prior to running out of medication and have them send an electronic request or submit request through the “request refill” option in your [employer] account.   · Refills are not addressed after hours or on w

## 2021-12-15 NOTE — PROGRESS NOTES
Michel Novak is a 49year old male who presents today to establish for type 2 diabetes management. Primary care physician: Tegan Dunaway MD  In the past 3m DM control has worsened .   Today's A1C 13.4%  ( last A1C  9.9% )       BG today 394 mg 12/21/2018   • Normocytic anemia    • Sinus tachycardia    • Thrombocytopenia (HCC)      Past Surgical History:   Procedure Laterality Date   • CYSTOSCOPY,INSERT URETERAL STENT      kidney stones x2   • VASECTOMY       Social History    Tobacco Use      Sm Uses 2 x daily 100 each 3           DM associated review of  symptoms:   Endocrine: Polyuria, polyphagia, polydipsia: no  Neurological: Paresthesias: no  HEENT: Blurred vision: no  Skin: no rash or wounds  Hematological: Hypoglycemia: no      Review of Sys blood sugar     If blood sugar is under 80 before the meal : NO Novolog    If blood sugar is 80- 120, take 3 units of insulin   If blood sugar is 121-160, take 4 units of insulin   If blood sugar is 161-200, take 5 units of insulin  If blood sugar is 201-2 daily as directed          Dispense:  18 mL          Refill:  1          Order Comments: Cancel levemir rx      Insulin Aspart, w/Niacinamide, (FIASP FLEXTOUCH) 100 UNIT/ML Subcutaneous Solution Pen-injector          Sig: Uses up to 20 units before meals a

## 2021-12-16 ENCOUNTER — TELEPHONE (OUTPATIENT)
Dept: ENDOCRINOLOGY CLINIC | Facility: CLINIC | Age: 56
End: 2021-12-16

## 2021-12-16 PROBLEM — E66.09 CLASS 1 OBESITY DUE TO EXCESS CALORIES WITH SERIOUS COMORBIDITY AND BODY MASS INDEX (BMI) OF 32.0 TO 32.9 IN ADULT: Status: ACTIVE | Noted: 2021-12-16

## 2021-12-16 PROBLEM — L98.9 LESION OF NECK: Status: RESOLVED | Noted: 2019-06-16 | Resolved: 2021-12-16

## 2021-12-16 PROBLEM — E11.65 TYPE 2 DIABETES MELLITUS WITH HYPERGLYCEMIA, WITH LONG-TERM CURRENT USE OF INSULIN (HCC): Status: ACTIVE | Noted: 2018-08-24

## 2021-12-16 PROBLEM — E66.811 CLASS 1 OBESITY DUE TO EXCESS CALORIES WITH SERIOUS COMORBIDITY AND BODY MASS INDEX (BMI) OF 32.0 TO 32.9 IN ADULT: Status: ACTIVE | Noted: 2021-12-16

## 2021-12-16 PROBLEM — Z79.4 TYPE 2 DIABETES MELLITUS WITH HYPERGLYCEMIA, WITH LONG-TERM CURRENT USE OF INSULIN (HCC): Status: ACTIVE | Noted: 2018-08-24

## 2021-12-16 RX ORDER — ATORVASTATIN CALCIUM 20 MG/1
TABLET, FILM COATED ORAL
Qty: 90 TABLET | Refills: 1 | Status: SHIPPED | OUTPATIENT
Start: 2021-12-16

## 2021-12-16 NOTE — TELEPHONE ENCOUNTER
Passed protocol     Last refill:  atorvastatin 20 MG Oral Tab 90 tablet 1 7/16/2021    Sig:   Take 1 tablet (20 mg total) by mouth nightly. LOV:   10/2/2021 Dr Junie Hines RTC 3-6 months  2. Mixed hyperlipidemia  Will check lipid panel.   On atorvastatin

## 2021-12-16 NOTE — TELEPHONE ENCOUNTER
Diabetes Education: LMTCB and sched appt with educator & check with pharmacy about coverage of Dexcom.

## 2021-12-17 ENCOUNTER — OFFICE VISIT (OUTPATIENT)
Dept: INTERNAL MEDICINE CLINIC | Facility: CLINIC | Age: 56
End: 2021-12-17
Payer: COMMERCIAL

## 2021-12-17 VITALS
OXYGEN SATURATION: 96 % | HEART RATE: 85 BPM | TEMPERATURE: 98 F | HEIGHT: 72 IN | RESPIRATION RATE: 12 BRPM | WEIGHT: 230.63 LBS | BODY MASS INDEX: 31.24 KG/M2 | DIASTOLIC BLOOD PRESSURE: 70 MMHG | SYSTOLIC BLOOD PRESSURE: 112 MMHG

## 2021-12-17 DIAGNOSIS — E78.2 MIXED HYPERLIPIDEMIA: Primary | Chronic | ICD-10-CM

## 2021-12-17 DIAGNOSIS — Z79.4 TYPE 2 DIABETES MELLITUS WITH HYPERGLYCEMIA, WITH LONG-TERM CURRENT USE OF INSULIN (HCC): Chronic | ICD-10-CM

## 2021-12-17 DIAGNOSIS — E11.65 TYPE 2 DIABETES MELLITUS WITH HYPERGLYCEMIA, WITH LONG-TERM CURRENT USE OF INSULIN (HCC): Chronic | ICD-10-CM

## 2021-12-17 DIAGNOSIS — E55.9 VITAMIN D DEFICIENCY: ICD-10-CM

## 2021-12-17 DIAGNOSIS — N48.89 PENILE IRRITATION: ICD-10-CM

## 2021-12-17 PROCEDURE — 99214 OFFICE O/P EST MOD 30 MIN: CPT | Performed by: INTERNAL MEDICINE

## 2021-12-17 PROCEDURE — 3078F DIAST BP <80 MM HG: CPT | Performed by: INTERNAL MEDICINE

## 2021-12-17 PROCEDURE — 3008F BODY MASS INDEX DOCD: CPT | Performed by: INTERNAL MEDICINE

## 2021-12-17 PROCEDURE — 3074F SYST BP LT 130 MM HG: CPT | Performed by: INTERNAL MEDICINE

## 2021-12-17 RX ORDER — CLOTRIMAZOLE AND BETAMETHASONE DIPROPIONATE 10; .64 MG/G; MG/G
1 CREAM TOPICAL 2 TIMES DAILY PRN
Qty: 45 G | Refills: 0 | Status: SHIPPED | OUTPATIENT
Start: 2021-12-17

## 2021-12-17 NOTE — PROGRESS NOTES
Mandeep Francis is a 49year old male. HPI:   Patient presents with: Follow - Up    Patient presents for follow up on chronic medical issues. Hyperlipidemia - on statin therapy, tolerating.   DM II - high glucose readings past few months - saw Mariusz Early Solution Pen-injector, Uses up to 60 units daily as directed (Patient taking differently: Inject 50 Units into the skin every morning.  Uses up to 60 units daily as directed), Disp: 6 mL, Rfl: 1  •  ergocalciferol 1.25 MG (91276 UT) Oral Cap, Take 1 capsule edema.  GI: soft non tender nondistended no hepatosplenomegaly, bowel sounds throughout  PSYCH: pleasant, appropriate mood and affect  ASSESSMENT AND PLAN:   1. Mixed hyperlipidemia  Lipids above goal in July.   Will check lipid panel with next labs; will i

## 2021-12-17 NOTE — PATIENT INSTRUCTIONS
- Get cholesterol blood test done when you are fasting (at least 8 hours). You can see if they can do the test now.   - Continue follow up with Viky Lo for diabetes  - Follow up with our Urologists if your symptoms come back  [facility] 20

## 2022-02-25 RX ORDER — PEN NEEDLE, DIABETIC 32GX 5/32"
NEEDLE, DISPOSABLE MISCELLANEOUS
Qty: 500 EACH | Refills: 3 | Status: SHIPPED | OUTPATIENT
Start: 2022-02-25 | End: 2022-03-30

## 2022-03-24 RX ORDER — INSULIN ASPART INJECTION 100 [IU]/ML
INJECTION, SOLUTION SUBCUTANEOUS
Qty: 30 ML | Refills: 0 | Status: SHIPPED | OUTPATIENT
Start: 2022-03-24

## 2022-03-31 RX ORDER — PEN NEEDLE, DIABETIC 32GX 5/32"
NEEDLE, DISPOSABLE MISCELLANEOUS
Qty: 500 EACH | Refills: 3 | Status: SHIPPED | OUTPATIENT
Start: 2022-03-31

## 2022-04-25 RX ORDER — INSULIN GLARGINE 300 U/ML
INJECTION, SOLUTION SUBCUTANEOUS
Qty: 6 ML | Refills: 0 | Status: SHIPPED | OUTPATIENT
Start: 2022-04-25

## 2022-05-02 RX ORDER — PEN NEEDLE, DIABETIC 32GX 5/32"
NEEDLE, DISPOSABLE MISCELLANEOUS
Qty: 500 EACH | Refills: 3 | Status: SHIPPED | OUTPATIENT
Start: 2022-05-02

## 2022-05-25 RX ORDER — INSULIN GLARGINE 300 U/ML
INJECTION, SOLUTION SUBCUTANEOUS
Qty: 6 ML | Refills: 0 | Status: SHIPPED | OUTPATIENT
Start: 2022-05-25 | End: 2022-07-07

## 2022-06-08 ENCOUNTER — TELEPHONE (OUTPATIENT)
Dept: ENDOCRINOLOGY CLINIC | Facility: CLINIC | Age: 57
End: 2022-06-08

## 2022-06-08 DIAGNOSIS — Z79.4 TYPE 2 DIABETES MELLITUS WITH HYPERGLYCEMIA, WITH LONG-TERM CURRENT USE OF INSULIN (HCC): Primary | ICD-10-CM

## 2022-06-08 DIAGNOSIS — E11.65 TYPE 2 DIABETES MELLITUS WITH HYPERGLYCEMIA, WITH LONG-TERM CURRENT USE OF INSULIN (HCC): Primary | ICD-10-CM

## 2022-06-08 NOTE — TELEPHONE ENCOUNTER
Pt cancelled 6-8-2022 appt- my chart message indicated had to stay at work   Offer telehealth appt and advise pt labs have been ordered. He needs to be seen sooner than 8-2022   Last A1c value was 13.4% done 12/15/2021.

## 2022-06-13 RX ORDER — PEN NEEDLE, DIABETIC 32GX 5/32"
NEEDLE, DISPOSABLE MISCELLANEOUS
Qty: 500 EACH | Refills: 3 | OUTPATIENT
Start: 2022-06-13

## 2022-06-13 RX ORDER — PEN NEEDLE, DIABETIC 32GX 5/32"
NEEDLE, DISPOSABLE MISCELLANEOUS
Qty: 500 EACH | Refills: 3 | Status: SHIPPED | OUTPATIENT
Start: 2022-06-13

## 2022-06-15 ENCOUNTER — OFFICE VISIT (OUTPATIENT)
Dept: ENDOCRINOLOGY CLINIC | Facility: CLINIC | Age: 57
End: 2022-06-15
Payer: COMMERCIAL

## 2022-06-15 VITALS
WEIGHT: 246.81 LBS | BODY MASS INDEX: 33 KG/M2 | DIASTOLIC BLOOD PRESSURE: 70 MMHG | HEART RATE: 97 BPM | RESPIRATION RATE: 17 BRPM | SYSTOLIC BLOOD PRESSURE: 124 MMHG | OXYGEN SATURATION: 98 %

## 2022-06-15 DIAGNOSIS — E78.5 DYSLIPIDEMIA: ICD-10-CM

## 2022-06-15 DIAGNOSIS — E11.65 TYPE 2 DIABETES MELLITUS WITH HYPERGLYCEMIA, WITH LONG-TERM CURRENT USE OF INSULIN (HCC): Primary | ICD-10-CM

## 2022-06-15 DIAGNOSIS — Z79.4 TYPE 2 DIABETES MELLITUS WITH HYPERGLYCEMIA, WITH LONG-TERM CURRENT USE OF INSULIN (HCC): Primary | ICD-10-CM

## 2022-06-15 LAB
CARTRIDGE LOT#: 889 NUMERIC
CREAT UR-SCNC: 199 MG/DL
GLUCOSE BLOOD: 256
HEMOGLOBIN A1C: 9.4 % (ref 4.3–5.6)
MICROALBUMIN UR-MCNC: 2.02 MG/DL
MICROALBUMIN/CREAT 24H UR-RTO: 10.2 UG/MG (ref ?–30)
TEST STRIP LOT #: NORMAL NUMERIC

## 2022-06-15 PROCEDURE — 82947 ASSAY GLUCOSE BLOOD QUANT: CPT | Performed by: NURSE PRACTITIONER

## 2022-06-15 PROCEDURE — 3061F NEG MICROALBUMINURIA REV: CPT | Performed by: INTERNAL MEDICINE

## 2022-06-15 PROCEDURE — 82043 UR ALBUMIN QUANTITATIVE: CPT | Performed by: NURSE PRACTITIONER

## 2022-06-15 PROCEDURE — 3078F DIAST BP <80 MM HG: CPT | Performed by: NURSE PRACTITIONER

## 2022-06-15 PROCEDURE — 99214 OFFICE O/P EST MOD 30 MIN: CPT | Performed by: NURSE PRACTITIONER

## 2022-06-15 PROCEDURE — 82570 ASSAY OF URINE CREATININE: CPT | Performed by: NURSE PRACTITIONER

## 2022-06-15 PROCEDURE — 3046F HEMOGLOBIN A1C LEVEL >9.0%: CPT | Performed by: INTERNAL MEDICINE

## 2022-06-15 PROCEDURE — 3074F SYST BP LT 130 MM HG: CPT | Performed by: NURSE PRACTITIONER

## 2022-06-15 PROCEDURE — 83036 HEMOGLOBIN GLYCOSYLATED A1C: CPT | Performed by: NURSE PRACTITIONER

## 2022-06-15 RX ORDER — EMPAGLIFLOZIN, METFORMIN HYDROCHLORIDE 5; 1000 MG/1; MG/1
2 TABLET, EXTENDED RELEASE ORAL DAILY
Qty: 180 TABLET | Refills: 1 | Status: SHIPPED | OUTPATIENT
Start: 2022-06-15

## 2022-06-15 RX ORDER — EMPAGLIFLOZIN, METFORMIN HYDROCHLORIDE 5; 1000 MG/1; MG/1
1 TABLET, EXTENDED RELEASE ORAL AS DIRECTED
Qty: 28 TABLET | Refills: 0 | COMMUNITY
Start: 2022-06-15 | End: 2022-07-15

## 2022-06-23 ENCOUNTER — TELEPHONE (OUTPATIENT)
Dept: ENDOCRINOLOGY CLINIC | Facility: CLINIC | Age: 57
End: 2022-06-23

## 2022-07-07 ENCOUNTER — TELEMEDICINE (OUTPATIENT)
Dept: ENDOCRINOLOGY CLINIC | Facility: CLINIC | Age: 57
End: 2022-07-07

## 2022-07-07 DIAGNOSIS — Z79.4 TYPE 2 DIABETES MELLITUS WITH HYPERGLYCEMIA, WITH LONG-TERM CURRENT USE OF INSULIN (HCC): Primary | ICD-10-CM

## 2022-07-07 DIAGNOSIS — E78.5 DYSLIPIDEMIA: ICD-10-CM

## 2022-07-07 DIAGNOSIS — E11.65 TYPE 2 DIABETES MELLITUS WITH HYPERGLYCEMIA, WITH LONG-TERM CURRENT USE OF INSULIN (HCC): Primary | ICD-10-CM

## 2022-07-07 PROCEDURE — 99214 OFFICE O/P EST MOD 30 MIN: CPT | Performed by: NURSE PRACTITIONER

## 2022-07-07 RX ORDER — INSULIN GLARGINE 300 U/ML
INJECTION, SOLUTION SUBCUTANEOUS
Qty: 18 ML | Refills: 1 | Status: SHIPPED | OUTPATIENT
Start: 2022-07-07

## 2022-07-15 RX ORDER — CLOTRIMAZOLE AND BETAMETHASONE DIPROPIONATE 10; .64 MG/G; MG/G
1 CREAM TOPICAL 2 TIMES DAILY PRN
Qty: 45 G | Refills: 3 | Status: SHIPPED | OUTPATIENT
Start: 2022-07-15

## 2022-07-15 NOTE — TELEPHONE ENCOUNTER
Pt verified is still using.     Clotrimazole-betamethasone cream  Filled 2-26-21  Qty 45 g  0 refills

## 2022-07-25 RX ORDER — INSULIN GLARGINE 300 U/ML
INJECTION, SOLUTION SUBCUTANEOUS
Qty: 12 ML | Refills: 1 | Status: SHIPPED | OUTPATIENT
Start: 2022-07-25 | End: 2022-12-28

## 2022-07-25 RX ORDER — INSULIN GLARGINE 300 U/ML
INJECTION, SOLUTION SUBCUTANEOUS
Qty: 6 ML | Refills: 0 | Status: SHIPPED | OUTPATIENT
Start: 2022-07-25

## 2022-09-06 ENCOUNTER — HOSPITAL ENCOUNTER (OUTPATIENT)
Age: 57
Discharge: ANOTHER HOSPITAL | End: 2022-09-06
Payer: COMMERCIAL

## 2022-09-06 ENCOUNTER — HOSPITAL ENCOUNTER (EMERGENCY)
Facility: HOSPITAL | Age: 57
Discharge: HOME | End: 2022-09-06
Attending: EMERGENCY MEDICINE
Payer: COMMERCIAL

## 2022-09-06 VITALS
DIASTOLIC BLOOD PRESSURE: 76 MMHG | WEIGHT: 220 LBS | TEMPERATURE: 99 F | RESPIRATION RATE: 16 BRPM | HEART RATE: 76 BPM | OXYGEN SATURATION: 97 % | HEIGHT: 72 IN | BODY MASS INDEX: 29.8 KG/M2 | SYSTOLIC BLOOD PRESSURE: 107 MMHG

## 2022-09-06 VITALS
OXYGEN SATURATION: 96 % | WEIGHT: 220 LBS | TEMPERATURE: 98 F | DIASTOLIC BLOOD PRESSURE: 90 MMHG | HEIGHT: 72 IN | RESPIRATION RATE: 16 BRPM | BODY MASS INDEX: 29.8 KG/M2 | SYSTOLIC BLOOD PRESSURE: 125 MMHG | HEART RATE: 116 BPM

## 2022-09-06 DIAGNOSIS — K61.0 PERIANAL ABSCESS: Primary | ICD-10-CM

## 2022-09-06 DIAGNOSIS — K61.1 PERIRECTAL ABSCESS: Primary | ICD-10-CM

## 2022-09-06 LAB — GLUCOSE BLD-MCNC: 134 MG/DL (ref 70–99)

## 2022-09-06 PROCEDURE — 99285 EMERGENCY DEPT VISIT HI MDM: CPT

## 2022-09-06 PROCEDURE — 82962 GLUCOSE BLOOD TEST: CPT

## 2022-09-06 PROCEDURE — 46050 I&D PERIANAL ABSCESS SUPFC: CPT

## 2022-09-06 PROCEDURE — 99212 OFFICE O/P EST SF 10 MIN: CPT

## 2022-09-06 PROCEDURE — 96365 THER/PROPH/DIAG IV INF INIT: CPT

## 2022-09-06 PROCEDURE — 99284 EMERGENCY DEPT VISIT MOD MDM: CPT

## 2022-09-06 PROCEDURE — 96375 TX/PRO/DX INJ NEW DRUG ADDON: CPT

## 2022-09-06 PROCEDURE — 96361 HYDRATE IV INFUSION ADD-ON: CPT

## 2022-09-06 RX ORDER — DOCUSATE SODIUM 100 MG/1
100 CAPSULE, LIQUID FILLED ORAL 2 TIMES DAILY
Qty: 60 CAPSULE | Refills: 0 | Status: SHIPPED | OUTPATIENT
Start: 2022-09-06 | End: 2022-10-06

## 2022-09-06 RX ORDER — HYDROMORPHONE HYDROCHLORIDE 1 MG/ML
0.5 INJECTION, SOLUTION INTRAMUSCULAR; INTRAVENOUS; SUBCUTANEOUS ONCE
Status: COMPLETED | OUTPATIENT
Start: 2022-09-06 | End: 2022-09-06

## 2022-09-06 RX ORDER — AMOXICILLIN AND CLAVULANATE POTASSIUM 875; 125 MG/1; MG/1
1 TABLET, FILM COATED ORAL 2 TIMES DAILY
Qty: 20 TABLET | Refills: 0 | Status: SHIPPED | OUTPATIENT
Start: 2022-09-06 | End: 2022-09-16

## 2022-09-06 RX ORDER — HYDROCODONE BITARTRATE AND ACETAMINOPHEN 5; 325 MG/1; MG/1
1-2 TABLET ORAL EVERY 6 HOURS PRN
Qty: 12 TABLET | Refills: 0 | Status: SHIPPED | OUTPATIENT
Start: 2022-09-06 | End: 2022-09-13

## 2022-09-06 RX ORDER — ONDANSETRON 2 MG/ML
4 INJECTION INTRAMUSCULAR; INTRAVENOUS ONCE
Status: COMPLETED | OUTPATIENT
Start: 2022-09-06 | End: 2022-09-06

## 2022-09-06 NOTE — ED INITIAL ASSESSMENT (HPI)
Pt states he went to [place] today for a \"bump on his anus\"  Pt was told to come straight here for possible sx.

## 2022-09-07 DIAGNOSIS — Z12.5 SCREENING FOR MALIGNANT NEOPLASM OF PROSTATE: Primary | ICD-10-CM

## 2022-09-14 ENCOUNTER — OFFICE VISIT (OUTPATIENT)
Dept: ENDOCRINOLOGY CLINIC | Facility: CLINIC | Age: 57
End: 2022-09-14
Payer: COMMERCIAL

## 2022-09-14 VITALS
SYSTOLIC BLOOD PRESSURE: 122 MMHG | RESPIRATION RATE: 16 BRPM | DIASTOLIC BLOOD PRESSURE: 60 MMHG | BODY MASS INDEX: 32 KG/M2 | WEIGHT: 236.63 LBS | OXYGEN SATURATION: 98 % | HEART RATE: 105 BPM

## 2022-09-14 DIAGNOSIS — K61.0 PERIANAL ABSCESS: ICD-10-CM

## 2022-09-14 DIAGNOSIS — E11.65 TYPE 2 DIABETES MELLITUS WITH HYPERGLYCEMIA, WITH LONG-TERM CURRENT USE OF INSULIN (HCC): Primary | ICD-10-CM

## 2022-09-14 DIAGNOSIS — Z79.4 TYPE 2 DIABETES MELLITUS WITH HYPERGLYCEMIA, WITH LONG-TERM CURRENT USE OF INSULIN (HCC): Primary | ICD-10-CM

## 2022-09-14 DIAGNOSIS — E78.2 MIXED HYPERLIPIDEMIA: ICD-10-CM

## 2022-09-14 PROBLEM — N48.89 PENILE IRRITATION: Status: RESOLVED | Noted: 2021-12-17 | Resolved: 2022-09-14

## 2022-09-14 LAB
CARTRIDGE LOT#: 989 NUMERIC
GLUCOSE BLOOD: 113
HEMOGLOBIN A1C: 8.6 % (ref 4.3–5.6)
TEST STRIP LOT #: NORMAL NUMERIC

## 2022-09-14 PROCEDURE — 3052F HG A1C>EQUAL 8.0%<EQUAL 9.0%: CPT | Performed by: INTERNAL MEDICINE

## 2022-09-14 PROCEDURE — 3074F SYST BP LT 130 MM HG: CPT | Performed by: NURSE PRACTITIONER

## 2022-09-14 PROCEDURE — 3078F DIAST BP <80 MM HG: CPT | Performed by: NURSE PRACTITIONER

## 2022-09-14 PROCEDURE — 99215 OFFICE O/P EST HI 40 MIN: CPT | Performed by: NURSE PRACTITIONER

## 2022-09-14 PROCEDURE — 83036 HEMOGLOBIN GLYCOSYLATED A1C: CPT | Performed by: NURSE PRACTITIONER

## 2022-09-14 PROCEDURE — 82947 ASSAY GLUCOSE BLOOD QUANT: CPT | Performed by: NURSE PRACTITIONER

## 2022-09-14 RX ORDER — FLASH GLUCOSE SENSOR
1 KIT MISCELLANEOUS
Qty: 1 EACH | Refills: 11 | Status: SHIPPED | OUTPATIENT
Start: 2022-09-14

## 2022-09-16 ENCOUNTER — OFFICE VISIT (OUTPATIENT)
Facility: LOCATION | Age: 57
End: 2022-09-16
Payer: COMMERCIAL

## 2022-09-16 VITALS — TEMPERATURE: 98 F | HEART RATE: 78 BPM

## 2022-09-16 DIAGNOSIS — Z87.19 HISTORY OF RECTAL ABSCESS: ICD-10-CM

## 2022-09-16 DIAGNOSIS — K61.1 RECTAL ABSCESS: Primary | ICD-10-CM

## 2022-09-16 PROCEDURE — 99244 OFF/OP CNSLTJ NEW/EST MOD 40: CPT | Performed by: SURGERY

## 2022-10-03 RX ORDER — INSULIN ASPART INJECTION 100 [IU]/ML
INJECTION, SOLUTION SUBCUTANEOUS
Qty: 30 ML | Refills: 1 | Status: SHIPPED | OUTPATIENT
Start: 2022-10-03

## 2022-10-03 NOTE — TELEPHONE ENCOUNTER
LOV: 09/14/2022  Future Appointments   Date Time Provider Ahmet Thomson   11/16/2022  2:30 PM Boneta Blood, APRN [facility]     Last A1c value was 8.6% done 9/14/2022.

## 2022-10-14 ENCOUNTER — OFFICE VISIT (OUTPATIENT)
Dept: INTERNAL MEDICINE CLINIC | Facility: CLINIC | Age: 57
End: 2022-10-14
Payer: COMMERCIAL

## 2022-10-14 VITALS
DIASTOLIC BLOOD PRESSURE: 74 MMHG | SYSTOLIC BLOOD PRESSURE: 100 MMHG | WEIGHT: 237.63 LBS | HEART RATE: 103 BPM | TEMPERATURE: 98 F | OXYGEN SATURATION: 98 % | HEIGHT: 72 IN | RESPIRATION RATE: 16 BRPM | BODY MASS INDEX: 32.19 KG/M2

## 2022-10-14 DIAGNOSIS — E11.65 TYPE 2 DIABETES MELLITUS WITH HYPERGLYCEMIA, WITH LONG-TERM CURRENT USE OF INSULIN (HCC): ICD-10-CM

## 2022-10-14 DIAGNOSIS — N52.9 ERECTILE DYSFUNCTION, UNSPECIFIED ERECTILE DYSFUNCTION TYPE: ICD-10-CM

## 2022-10-14 DIAGNOSIS — Z23 NEED FOR SHINGLES VACCINE: ICD-10-CM

## 2022-10-14 DIAGNOSIS — Z23 NEED FOR IMMUNIZATION AGAINST INFLUENZA: ICD-10-CM

## 2022-10-14 DIAGNOSIS — Z23 NEED FOR DIPHTHERIA-TETANUS-PERTUSSIS (TDAP) VACCINE: ICD-10-CM

## 2022-10-14 DIAGNOSIS — R23.9 ABNORMAL APPEARANCE OF SKIN: ICD-10-CM

## 2022-10-14 DIAGNOSIS — Z79.4 TYPE 2 DIABETES MELLITUS WITH HYPERGLYCEMIA, WITH LONG-TERM CURRENT USE OF INSULIN (HCC): ICD-10-CM

## 2022-10-14 DIAGNOSIS — K61.1 RECTAL ABSCESS: Primary | ICD-10-CM

## 2022-10-14 PROCEDURE — 90686 IIV4 VACC NO PRSV 0.5 ML IM: CPT | Performed by: INTERNAL MEDICINE

## 2022-10-14 PROCEDURE — 3074F SYST BP LT 130 MM HG: CPT | Performed by: INTERNAL MEDICINE

## 2022-10-14 PROCEDURE — 90471 IMMUNIZATION ADMIN: CPT | Performed by: INTERNAL MEDICINE

## 2022-10-14 PROCEDURE — 90472 IMMUNIZATION ADMIN EACH ADD: CPT | Performed by: INTERNAL MEDICINE

## 2022-10-14 PROCEDURE — 3078F DIAST BP <80 MM HG: CPT | Performed by: INTERNAL MEDICINE

## 2022-10-14 PROCEDURE — 90750 HZV VACC RECOMBINANT IM: CPT | Performed by: INTERNAL MEDICINE

## 2022-10-14 PROCEDURE — 99214 OFFICE O/P EST MOD 30 MIN: CPT | Performed by: INTERNAL MEDICINE

## 2022-10-14 PROCEDURE — 3008F BODY MASS INDEX DOCD: CPT | Performed by: INTERNAL MEDICINE

## 2022-10-14 RX ORDER — TRIAMCINOLONE ACETONIDE 1 MG/G
CREAM TOPICAL 2 TIMES DAILY PRN
Qty: 80 G | Refills: 1 | Status: SHIPPED | OUTPATIENT
Start: 2022-10-14

## 2022-10-14 RX ORDER — SILDENAFIL 100 MG/1
100 TABLET, FILM COATED ORAL AS NEEDED
Qty: 30 TABLET | Refills: 11 | Status: SHIPPED | OUTPATIENT
Start: 2022-10-14 | End: 2023-04-12

## 2022-10-14 RX ORDER — CLOTRIMAZOLE AND BETAMETHASONE DIPROPIONATE 10; .64 MG/G; MG/G
1 CREAM TOPICAL 2 TIMES DAILY PRN
Qty: 45 G | Refills: 3 | Status: SHIPPED | OUTPATIENT
Start: 2022-10-14

## 2022-10-14 RX ORDER — INSULIN ASPART INJECTION 100 [IU]/ML
INJECTION, SOLUTION SUBCUTANEOUS
COMMUNITY

## 2022-10-14 NOTE — PATIENT INSTRUCTIONS
- The area looks fine, there is an open cut but no bleeding/pus.  - Continue antibiotics until they are finished  - Follow up with the surgeon Dr. Nicholas Jain some time in the next month:  [address], [address]  [phone]    [address], [address]  [phone]    - Start new cream, triamcinolone, for your arm/elbow skin issues. Use twice daily for 2-3 weeks. Take a break after that. - Betamethasone-clotrimazole, sildenafil refilled. - Take daily over the counter vitamin D (2000 units) until the spring time  - Flu shot, first shingles shot given today. Will need second shingles shot in 2-6 months. We will give you your tetanus shot at that time as well. - Follow up with me in 6 months for chronic issues; follow up earlier as needed. It was a pleasure seeing you in the clinic today. Thank you for choosing the [facility] office for your healthcare needs. Please call at [phone] with any questions or concerns.     Sienna Flower MD

## 2022-10-22 ENCOUNTER — PATIENT MESSAGE (OUTPATIENT)
Dept: INTERNAL MEDICINE CLINIC | Facility: CLINIC | Age: 57
End: 2022-10-22

## 2022-10-24 NOTE — TELEPHONE ENCOUNTER
From: Chloé Cummings  To: Michael Medina MD  Sent: 10/22/2022 9:12 AM CDT  Subject: Eye exam.    Hi Doctor here is the my eye exam results. Please let me know if you need anything else. Thanks.

## 2022-11-16 ENCOUNTER — OFFICE VISIT (OUTPATIENT)
Dept: ENDOCRINOLOGY CLINIC | Facility: CLINIC | Age: 57
End: 2022-11-16
Payer: COMMERCIAL

## 2022-11-16 VITALS
SYSTOLIC BLOOD PRESSURE: 112 MMHG | OXYGEN SATURATION: 98 % | RESPIRATION RATE: 17 BRPM | BODY MASS INDEX: 33 KG/M2 | WEIGHT: 245.81 LBS | DIASTOLIC BLOOD PRESSURE: 60 MMHG | HEART RATE: 85 BPM

## 2022-11-16 DIAGNOSIS — E66.09 CLASS 1 OBESITY DUE TO EXCESS CALORIES WITH SERIOUS COMORBIDITY AND BODY MASS INDEX (BMI) OF 33.0 TO 33.9 IN ADULT: ICD-10-CM

## 2022-11-16 DIAGNOSIS — E78.2 MIXED HYPERLIPIDEMIA: ICD-10-CM

## 2022-11-16 DIAGNOSIS — E11.65 TYPE 2 DIABETES MELLITUS WITH HYPERGLYCEMIA, WITH LONG-TERM CURRENT USE OF INSULIN (HCC): Primary | ICD-10-CM

## 2022-11-16 DIAGNOSIS — Z79.4 TYPE 2 DIABETES MELLITUS WITH HYPERGLYCEMIA, WITH LONG-TERM CURRENT USE OF INSULIN (HCC): Primary | ICD-10-CM

## 2022-11-16 PROCEDURE — 3078F DIAST BP <80 MM HG: CPT | Performed by: NURSE PRACTITIONER

## 2022-11-16 PROCEDURE — 99214 OFFICE O/P EST MOD 30 MIN: CPT | Performed by: NURSE PRACTITIONER

## 2022-11-16 PROCEDURE — 3074F SYST BP LT 130 MM HG: CPT | Performed by: NURSE PRACTITIONER

## 2022-11-16 RX ORDER — GLIPIZIDE 5 MG/1
5 TABLET, FILM COATED, EXTENDED RELEASE ORAL DAILY
Qty: 90 TABLET | Refills: 1 | Status: SHIPPED | OUTPATIENT
Start: 2022-11-16

## 2022-11-16 RX ORDER — EMPAGLIFLOZIN, METFORMIN HYDROCHLORIDE 5; 1000 MG/1; MG/1
2 TABLET, EXTENDED RELEASE ORAL DAILY
Qty: 180 TABLET | Refills: 1 | Status: SHIPPED | OUTPATIENT
Start: 2022-11-16

## 2022-11-16 RX ORDER — PEN NEEDLE, DIABETIC 32GX 5/32"
NEEDLE, DISPOSABLE MISCELLANEOUS
Qty: 300 EACH | Refills: 3 | Status: SHIPPED | OUTPATIENT
Start: 2022-11-16

## 2022-12-09 ENCOUNTER — IMMUNIZATION (OUTPATIENT)
Dept: LAB | Age: 57
End: 2022-12-09
Attending: EMERGENCY MEDICINE
Payer: COMMERCIAL

## 2022-12-09 DIAGNOSIS — Z23 NEED FOR VACCINATION: Primary | ICD-10-CM

## 2022-12-09 PROCEDURE — 0124A SARSCOV2 VAC BVL 30MCG/0.3ML: CPT

## 2022-12-20 RX ORDER — EMPAGLIFLOZIN, METFORMIN HYDROCHLORIDE 5; 1000 MG/1; MG/1
2 TABLET, EXTENDED RELEASE ORAL DAILY
Qty: 180 TABLET | Refills: 1 | Status: SHIPPED | OUTPATIENT
Start: 2022-12-20

## 2022-12-28 RX ORDER — INSULIN GLARGINE 300 U/ML
INJECTION, SOLUTION SUBCUTANEOUS
Qty: 18 ML | Refills: 0 | Status: SHIPPED | OUTPATIENT
Start: 2022-12-28

## 2023-02-13 ENCOUNTER — PATIENT MESSAGE (OUTPATIENT)
Dept: INTERNAL MEDICINE CLINIC | Facility: CLINIC | Age: 58
End: 2023-02-13

## 2023-02-13 NOTE — TELEPHONE ENCOUNTER
From: Michele Stone  Sent: 2/13/2023 2:19 PM CST  To: [facility] Clinical Staff  Subject: Reference     Thanks, so I believe Dr Diamond Medina Urology and Dr Sarah Urbina Dermatologist?

## 2023-02-13 NOTE — TELEPHONE ENCOUNTER
From: Pita Almanza  To: Noreen Chew MD  Sent: 2/13/2023 11:55 AM CST  Subject: Reference     Hi doctor just if you can send me to references for Urologist Doctor and Dermatologist Doctor, because I need to check couple things. Maybe you remember this . Thanks and have a great day. Best Regards. Prashant.

## 2023-02-15 ENCOUNTER — OFFICE VISIT (OUTPATIENT)
Dept: ENDOCRINOLOGY CLINIC | Facility: CLINIC | Age: 58
End: 2023-02-15
Payer: COMMERCIAL

## 2023-02-15 VITALS
SYSTOLIC BLOOD PRESSURE: 112 MMHG | WEIGHT: 239 LBS | DIASTOLIC BLOOD PRESSURE: 68 MMHG | OXYGEN SATURATION: 97 % | RESPIRATION RATE: 16 BRPM | HEART RATE: 89 BPM | BODY MASS INDEX: 32 KG/M2

## 2023-02-15 DIAGNOSIS — E78.2 MIXED HYPERLIPIDEMIA: ICD-10-CM

## 2023-02-15 DIAGNOSIS — Z79.4 TYPE 2 DIABETES MELLITUS WITH HYPERGLYCEMIA, WITH LONG-TERM CURRENT USE OF INSULIN (HCC): Primary | ICD-10-CM

## 2023-02-15 DIAGNOSIS — E78.5 DYSLIPIDEMIA: ICD-10-CM

## 2023-02-15 DIAGNOSIS — B35.6 TINEA CRURIS: ICD-10-CM

## 2023-02-15 DIAGNOSIS — E66.09 CLASS 1 OBESITY DUE TO EXCESS CALORIES WITH SERIOUS COMORBIDITY AND BODY MASS INDEX (BMI) OF 33.0 TO 33.9 IN ADULT: ICD-10-CM

## 2023-02-15 DIAGNOSIS — E11.65 TYPE 2 DIABETES MELLITUS WITH HYPERGLYCEMIA, WITH LONG-TERM CURRENT USE OF INSULIN (HCC): Primary | ICD-10-CM

## 2023-02-15 LAB
CARTRIDGE LOT#: 301 NUMERIC
CREAT UR-SCNC: 93 MG/DL
GLUCOSE BLOOD: 119
HEMOGLOBIN A1C: 8 % (ref 4.3–5.6)
MICROALBUMIN UR-MCNC: 1.25 MG/DL
MICROALBUMIN/CREAT 24H UR-RTO: 13.4 UG/MG (ref ?–30)
TEST STRIP LOT #: NORMAL NUMERIC

## 2023-02-15 PROCEDURE — 83036 HEMOGLOBIN GLYCOSYLATED A1C: CPT | Performed by: NURSE PRACTITIONER

## 2023-02-15 PROCEDURE — 82570 ASSAY OF URINE CREATININE: CPT | Performed by: NURSE PRACTITIONER

## 2023-02-15 PROCEDURE — 99215 OFFICE O/P EST HI 40 MIN: CPT | Performed by: NURSE PRACTITIONER

## 2023-02-15 PROCEDURE — 3078F DIAST BP <80 MM HG: CPT | Performed by: NURSE PRACTITIONER

## 2023-02-15 PROCEDURE — 3061F NEG MICROALBUMINURIA REV: CPT | Performed by: INTERNAL MEDICINE

## 2023-02-15 PROCEDURE — 3074F SYST BP LT 130 MM HG: CPT | Performed by: NURSE PRACTITIONER

## 2023-02-15 PROCEDURE — 82043 UR ALBUMIN QUANTITATIVE: CPT | Performed by: NURSE PRACTITIONER

## 2023-02-15 PROCEDURE — 82947 ASSAY GLUCOSE BLOOD QUANT: CPT | Performed by: NURSE PRACTITIONER

## 2023-02-15 RX ORDER — NYSTATIN 100000 [USP'U]/G
POWDER TOPICAL
Qty: 60 G | Refills: 0 | Status: SHIPPED | OUTPATIENT
Start: 2023-02-15

## 2023-02-15 RX ORDER — DAPAGLIFLOZIN AND METFORMIN HYDROCHLORIDE 5; 1000 MG/1; MG/1
1 TABLET, FILM COATED, EXTENDED RELEASE ORAL DAILY
Qty: 28 TABLET | Refills: 0 | COMMUNITY
Start: 2023-02-15 | End: 2023-03-17

## 2023-02-15 RX ORDER — FLUCONAZOLE 150 MG/1
TABLET ORAL
Qty: 2 TABLET | Refills: 0 | Status: SHIPPED | OUTPATIENT
Start: 2023-02-15

## 2023-02-15 RX ORDER — INSULIN ASPART INJECTION 100 [IU]/ML
INJECTION, SOLUTION SUBCUTANEOUS
Qty: 1 EACH | Refills: 0 | COMMUNITY
Start: 2023-02-15

## 2023-02-15 RX ORDER — DAPAGLIFLOZIN AND METFORMIN HYDROCHLORIDE 5; 1000 MG/1; MG/1
2 TABLET, FILM COATED, EXTENDED RELEASE ORAL DAILY
Qty: 180 TABLET | Refills: 1 | Status: SHIPPED | OUTPATIENT
Start: 2023-02-15

## 2023-02-27 ENCOUNTER — TELEPHONE (OUTPATIENT)
Dept: INTERNAL MEDICINE CLINIC | Facility: CLINIC | Age: 58
End: 2023-02-27

## 2023-03-10 RX ORDER — INSULIN GLARGINE 300 U/ML
INJECTION, SOLUTION SUBCUTANEOUS
Qty: 18 ML | Refills: 0 | Status: SHIPPED | OUTPATIENT
Start: 2023-03-10

## 2023-03-10 NOTE — TELEPHONE ENCOUNTER
02/15/2023  Future Appointments   Date Time Provider Ahmet Thomson   3/31/2023  8:30 AM Dave Haney MD [facility]   5/17/2023  2:15 PM FINN Bryan [facility] [place]     Last A1c value was 8% done 2/15/2023.

## 2023-03-13 RX ORDER — CLOTRIMAZOLE AND BETAMETHASONE DIPROPIONATE 10; .64 MG/G; MG/G
1 CREAM TOPICAL 2 TIMES DAILY PRN
Qty: 45 G | Refills: 3 | Status: SHIPPED | OUTPATIENT
Start: 2023-03-13

## 2023-03-17 NOTE — ED INITIAL ASSESSMENT (HPI)
Pt arrives via wheelchair  Diaphoretic, moaning. C/o abdominal pain and left testicle pain. States drank tequila 8 days ago and hasn't eaten and taken fluids since. Denies alcohol ingestion yesterday or today.   Wife states she believes he may have had a
16

## 2023-03-21 ENCOUNTER — OFFICE VISIT (OUTPATIENT)
Facility: LOCATION | Age: 58
End: 2023-03-21
Payer: COMMERCIAL

## 2023-03-21 VITALS — HEART RATE: 80 BPM | TEMPERATURE: 97 F

## 2023-03-21 DIAGNOSIS — L98.8 FISTULA: Primary | ICD-10-CM

## 2023-03-21 DIAGNOSIS — Z87.19 HISTORY OF RECTAL ABSCESS: ICD-10-CM

## 2023-03-21 PROCEDURE — 46600 DIAGNOSTIC ANOSCOPY SPX: CPT | Performed by: SURGERY

## 2023-03-21 PROCEDURE — 99214 OFFICE O/P EST MOD 30 MIN: CPT | Performed by: SURGERY

## 2023-03-31 ENCOUNTER — OFFICE VISIT (OUTPATIENT)
Dept: SURGERY | Facility: CLINIC | Age: 58
End: 2023-03-31

## 2023-03-31 ENCOUNTER — TELEPHONE (OUTPATIENT)
Dept: SURGERY | Facility: CLINIC | Age: 58
End: 2023-03-31

## 2023-03-31 DIAGNOSIS — N48.1 BALANITIS: ICD-10-CM

## 2023-03-31 DIAGNOSIS — N47.1 PHIMOSIS: ICD-10-CM

## 2023-03-31 DIAGNOSIS — N48.89 PENILE IRRITATION: Primary | ICD-10-CM

## 2023-03-31 LAB
APPEARANCE: CLEAR
BILIRUBIN: NEGATIVE
GLUCOSE (URINE DIPSTICK): >=1000 MG/DL
KETONES (URINE DIPSTICK): NEGATIVE MG/DL
LEUKOCYTES: NEGATIVE
MULTISTIX LOT#: ABNORMAL NUMERIC
NITRITE, URINE: NEGATIVE
OCCULT BLOOD: NEGATIVE
PH, URINE: 5 (ref 4.5–8)
PROTEIN (URINE DIPSTICK): NEGATIVE MG/DL
SPECIFIC GRAVITY: 1.01 (ref 1–1.03)
URINE-COLOR: YELLOW
UROBILINOGEN,SEMI-QN: 0.2 MG/DL (ref 0–1.9)

## 2023-03-31 RX ORDER — TAMSULOSIN HYDROCHLORIDE 0.4 MG/1
0.4 CAPSULE ORAL EVERY EVENING
Qty: 90 CAPSULE | Refills: 6 | Status: SHIPPED | OUTPATIENT
Start: 2023-03-31

## 2023-03-31 NOTE — TELEPHONE ENCOUNTER
Hi,    Can you please schedule this patient for surgery. Thanks,  [facility]     Urology Surgery Request  Surgeon: Darwin Nicholas  Location (if known): [facility]  Procedure: Circumcision   Anesthesia: General   Time Frame: Next available  Time required: 60 minutes  Diagnosis: Balanitis  Special Equipment: None    Antibiotics: per hospital protocol unless checked below   ___ Levaquin 500 mg IV   ___ Gemcitabine 2 g/100 mL NS bladder instillation to be given in OR    Estimated Post Op/Follow Up Appt:   4 weeks for postop. Please schedule appointment at time of surgery scheduling.

## 2023-04-06 RX ORDER — GLIPIZIDE 5 MG/1
TABLET, FILM COATED, EXTENDED RELEASE ORAL
Qty: 90 TABLET | Refills: 1 | Status: SHIPPED | OUTPATIENT
Start: 2023-04-06

## 2023-04-26 ENCOUNTER — PATIENT MESSAGE (OUTPATIENT)
Dept: ENDOCRINOLOGY CLINIC | Facility: CLINIC | Age: 58
End: 2023-04-26

## 2023-04-26 DIAGNOSIS — Z79.4 TYPE 2 DIABETES MELLITUS WITH HYPERGLYCEMIA, WITH LONG-TERM CURRENT USE OF INSULIN (HCC): Primary | ICD-10-CM

## 2023-04-26 DIAGNOSIS — E11.65 TYPE 2 DIABETES MELLITUS WITH HYPERGLYCEMIA, WITH LONG-TERM CURRENT USE OF INSULIN (HCC): Primary | ICD-10-CM

## 2023-04-26 NOTE — TELEPHONE ENCOUNTER
From: Lian Maurice  To: FINN Lainez  Sent: 4/26/2023 8:49 AM CDT  Subject: Surgery May 11. Robert Paul, I'm coming to a surgery on May 11/2023. My indication is stop 4 days before the Xiduo and glipizide one day before. But the nurse told me I need to ask you how many units insulin I need to take the day of surgery. And also maybe I need reschedule my appointment with you on May 17. Thanks and Best regards. Prashant.

## 2023-04-28 ENCOUNTER — EKG ENCOUNTER (OUTPATIENT)
Dept: LAB | Age: 58
End: 2023-04-28
Attending: SURGERY
Payer: COMMERCIAL

## 2023-04-28 ENCOUNTER — LABORATORY ENCOUNTER (OUTPATIENT)
Dept: LAB | Age: 58
End: 2023-04-28
Attending: SURGERY
Payer: COMMERCIAL

## 2023-04-28 DIAGNOSIS — Z79.4 TYPE 2 DIABETES MELLITUS WITH HYPERGLYCEMIA, WITH LONG-TERM CURRENT USE OF INSULIN (HCC): ICD-10-CM

## 2023-04-28 DIAGNOSIS — L98.8 FISTULA: ICD-10-CM

## 2023-04-28 DIAGNOSIS — E11.65 TYPE 2 DIABETES MELLITUS WITH HYPERGLYCEMIA, WITH LONG-TERM CURRENT USE OF INSULIN (HCC): ICD-10-CM

## 2023-04-28 LAB
ANION GAP SERPL CALC-SCNC: 5 MMOL/L (ref 0–18)
ATRIAL RATE: 92 BPM
BASOPHILS # BLD AUTO: 0.04 X10(3) UL (ref 0–0.2)
BASOPHILS NFR BLD AUTO: 0.4 %
BUN BLD-MCNC: 15 MG/DL (ref 7–18)
CALCIUM BLD-MCNC: 9.2 MG/DL (ref 8.5–10.1)
CHLORIDE SERPL-SCNC: 112 MMOL/L (ref 98–112)
CO2 SERPL-SCNC: 20 MMOL/L (ref 21–32)
CREAT BLD-MCNC: 0.96 MG/DL
EOSINOPHIL # BLD AUTO: 0.28 X10(3) UL (ref 0–0.7)
EOSINOPHIL NFR BLD AUTO: 3.1 %
ERYTHROCYTE [DISTWIDTH] IN BLOOD BY AUTOMATED COUNT: 13.7 %
EST. AVERAGE GLUCOSE BLD GHB EST-MCNC: 200 MG/DL (ref 68–126)
FASTING STATUS PATIENT QL REPORTED: YES
GFR SERPLBLD BASED ON 1.73 SQ M-ARVRAT: 93 ML/MIN/1.73M2 (ref 60–?)
GLUCOSE BLD-MCNC: 122 MG/DL (ref 70–99)
HBA1C MFR BLD: 8.6 % (ref ?–5.7)
HCT VFR BLD AUTO: 50.9 %
HGB BLD-MCNC: 17.1 G/DL
IMM GRANULOCYTES # BLD AUTO: 0.05 X10(3) UL (ref 0–1)
IMM GRANULOCYTES NFR BLD: 0.6 %
LYMPHOCYTES # BLD AUTO: 2.17 X10(3) UL (ref 1–4)
LYMPHOCYTES NFR BLD AUTO: 24.1 %
MCH RBC QN AUTO: 29.5 PG (ref 26–34)
MCHC RBC AUTO-ENTMCNC: 33.6 G/DL (ref 31–37)
MCV RBC AUTO: 87.8 FL
MONOCYTES # BLD AUTO: 0.61 X10(3) UL (ref 0.1–1)
MONOCYTES NFR BLD AUTO: 6.8 %
NEUTROPHILS # BLD AUTO: 5.87 X10 (3) UL (ref 1.5–7.7)
NEUTROPHILS # BLD AUTO: 5.87 X10(3) UL (ref 1.5–7.7)
NEUTROPHILS NFR BLD AUTO: 65 %
OSMOLALITY SERPL CALC.SUM OF ELEC: 286 MOSM/KG (ref 275–295)
P AXIS: 44 DEGREES
P-R INTERVAL: 132 MS
PLATELET # BLD AUTO: 265 10(3)UL (ref 150–450)
POTASSIUM SERPL-SCNC: 4.2 MMOL/L (ref 3.5–5.1)
Q-T INTERVAL: 328 MS
QRS DURATION: 74 MS
QTC CALCULATION (BEZET): 405 MS
R AXIS: -14 DEGREES
RBC # BLD AUTO: 5.8 X10(6)UL
SODIUM SERPL-SCNC: 137 MMOL/L (ref 136–145)
T AXIS: 22 DEGREES
VENTRICULAR RATE: 92 BPM
WBC # BLD AUTO: 9 X10(3) UL (ref 4–11)

## 2023-04-28 PROCEDURE — 93005 ELECTROCARDIOGRAM TRACING: CPT

## 2023-04-28 PROCEDURE — 85025 COMPLETE CBC W/AUTO DIFF WBC: CPT

## 2023-04-28 PROCEDURE — 80048 BASIC METABOLIC PNL TOTAL CA: CPT

## 2023-04-28 PROCEDURE — 3052F HG A1C>EQUAL 8.0%<EQUAL 9.0%: CPT | Performed by: INTERNAL MEDICINE

## 2023-04-28 PROCEDURE — 36415 COLL VENOUS BLD VENIPUNCTURE: CPT

## 2023-04-28 PROCEDURE — 83036 HEMOGLOBIN GLYCOSYLATED A1C: CPT

## 2023-04-28 PROCEDURE — 93010 ELECTROCARDIOGRAM REPORT: CPT | Performed by: INTERNAL MEDICINE

## 2023-05-03 ENCOUNTER — ANESTHESIA EVENT (OUTPATIENT)
Dept: SURGERY | Facility: HOSPITAL | Age: 58
End: 2023-05-03
Payer: COMMERCIAL

## 2023-05-04 ENCOUNTER — TELEPHONE (OUTPATIENT)
Dept: INTERNAL MEDICINE CLINIC | Facility: CLINIC | Age: 58
End: 2023-05-04

## 2023-05-04 NOTE — PAT NURSING NOTE
Chart reviewed by anesthesiologist Dr. Estelle Cole for EKG. Received an order for Cardiac Clearance. Faxed this request to the surgeon and PCP. Received fax confirmation. Telephoned PCP office and spoke to Codie Ellsworth and informed of above and requested that the clearance be faxed to the PAT department ASAP.

## 2023-05-04 NOTE — TELEPHONE ENCOUNTER
Left detailed VM on pt's secured VM. Pt is scheduled for surgery for 5/11 with Dr. Liban Moore. Needs preop ASAP.

## 2023-05-05 NOTE — TELEPHONE ENCOUNTER
Future Appointments   Date Time Provider Ahmet Alix   5/9/2023  4:30 PM Armando Fry MD [facility] [place]

## 2023-05-08 RX ORDER — GLIPIZIDE 5 MG/1
5 TABLET, FILM COATED, EXTENDED RELEASE ORAL DAILY
Qty: 90 TABLET | Refills: 1 | Status: SHIPPED | OUTPATIENT
Start: 2023-05-08

## 2023-05-09 ENCOUNTER — TELEPHONE (OUTPATIENT)
Facility: LOCATION | Age: 58
End: 2023-05-09

## 2023-05-09 ENCOUNTER — OFFICE VISIT (OUTPATIENT)
Dept: INTERNAL MEDICINE CLINIC | Facility: CLINIC | Age: 58
End: 2023-05-09
Payer: COMMERCIAL

## 2023-05-09 VITALS
HEIGHT: 72 IN | HEART RATE: 90 BPM | SYSTOLIC BLOOD PRESSURE: 130 MMHG | WEIGHT: 241.19 LBS | DIASTOLIC BLOOD PRESSURE: 72 MMHG | RESPIRATION RATE: 98 BRPM | BODY MASS INDEX: 32.67 KG/M2

## 2023-05-09 DIAGNOSIS — Z01.818 PREOPERATIVE EXAMINATION: Primary | ICD-10-CM

## 2023-05-09 DIAGNOSIS — E11.65 TYPE 2 DIABETES MELLITUS WITH HYPERGLYCEMIA, WITH LONG-TERM CURRENT USE OF INSULIN (HCC): Chronic | ICD-10-CM

## 2023-05-09 DIAGNOSIS — K64.0 FIRST DEGREE HEMORRHOIDS: ICD-10-CM

## 2023-05-09 DIAGNOSIS — Z79.4 TYPE 2 DIABETES MELLITUS WITH HYPERGLYCEMIA, WITH LONG-TERM CURRENT USE OF INSULIN (HCC): Chronic | ICD-10-CM

## 2023-05-09 DIAGNOSIS — R94.31 ABNORMAL EKG: ICD-10-CM

## 2023-05-09 DIAGNOSIS — E78.2 MIXED HYPERLIPIDEMIA: Chronic | ICD-10-CM

## 2023-05-09 PROCEDURE — 3078F DIAST BP <80 MM HG: CPT | Performed by: INTERNAL MEDICINE

## 2023-05-09 PROCEDURE — 3075F SYST BP GE 130 - 139MM HG: CPT | Performed by: INTERNAL MEDICINE

## 2023-05-09 PROCEDURE — 99214 OFFICE O/P EST MOD 30 MIN: CPT | Performed by: INTERNAL MEDICINE

## 2023-05-09 PROCEDURE — 3008F BODY MASS INDEX DOCD: CPT | Performed by: INTERNAL MEDICINE

## 2023-05-09 RX ORDER — INSULIN GLARGINE 300 U/ML
INJECTION, SOLUTION SUBCUTANEOUS
Qty: 18 ML | Refills: 0 | Status: SHIPPED | OUTPATIENT
Start: 2023-05-09

## 2023-05-09 RX ORDER — CLOTRIMAZOLE AND BETAMETHASONE DIPROPIONATE 10; .64 MG/G; MG/G
1 CREAM TOPICAL 2 TIMES DAILY PRN
Qty: 45 G | Refills: 3 | Status: SHIPPED | OUTPATIENT
Start: 2023-05-09

## 2023-05-09 NOTE — TELEPHONE ENCOUNTER
ransaction ID: [id number] ID: [id number] Date: 2023-05-09  Javy Castillo Patient  Member ID  [id number]    Date of Birth  1965-06-01    Gender  Male    Transaction Type  Outpatient Authorization    Organization  [address] [place] [address]    [facility] [place] logo     Certificate Information  Reference Number  [id number]    Status  NO ACTION REQUIRED    Message  Requested Service does not require preauthorization. We would strongly encourage you to check benefits for this service.     Member Information  Patient Name  Javy Castillo    Patient Date of Birth  [id number]    Patient Gender  Male    Member ID  [id number]    Relationship to [address] Name  Javy Castillo    Requesting Provider     Name  Princess Albert  [id number]    Tax Id  [id number]    Specialty  [id number]  Provider Role  Provider    [facility], [address] [place] [address], [address]    Phone  [phone]  Fax  [phone]    Contact Name  Radha John    Service Information  Service Type  2 - Surgical    Place of Service  [facility]    Service From - To Date  2023-05-11 - 2023-08-11    Level of Service  Elective    Diagnosis Code 1  K603 - Anal fistula    Procedure Code 1 (CPT/HCPCS)  30352 - REMOVE ANAL FIST COMPLEX    Quantity  1 Units    Status  NO ACTION REQUIRED    Rendering Provider/Facility     Provider 1  Name  Princess Albert  [id number]    Specialty  [id number]  Provider Role  Attending    Address  [address] [place] [address], [address] [facility], [address]    Provider 2  [facility]  [facility]  [id number]    Provider Role  Facility    Address  [address] [facility], [facility], [address]

## 2023-05-09 NOTE — PATIENT INSTRUCTIONS
- [id number] Kamala Fernandez for surgery from my perspective  - Your EKG was a little bit abnormal - but as you are not having chest pain, shortness of breath, ok from my perspective for you to have surgery. - However I would like you to follow up with the cardiologists to discuss your EKG and your symptoms:  Devi Conklin, [address], [facility] Entrance [facility] 12  [address] [place] [address], [address]  Phone: [phone]  - Betamethasone-clotrimazole cream refilled to [facility]. It was a pleasure seeing you in the clinic today. Thank you for choosing the [facility] office for your healthcare needs. Please call at [phone] with any questions or concerns.     Marcelino Brown MD

## 2023-05-11 ENCOUNTER — ANESTHESIA (OUTPATIENT)
Dept: SURGERY | Facility: HOSPITAL | Age: 58
End: 2023-05-11
Payer: COMMERCIAL

## 2023-05-11 ENCOUNTER — HOSPITAL ENCOUNTER (OUTPATIENT)
Facility: HOSPITAL | Age: 58
Setting detail: HOSPITAL OUTPATIENT SURGERY
Discharge: HOME | End: 2023-05-11
Attending: SURGERY | Admitting: SURGERY
Payer: COMMERCIAL

## 2023-05-11 VITALS
HEART RATE: 84 BPM | WEIGHT: 238 LBS | DIASTOLIC BLOOD PRESSURE: 73 MMHG | SYSTOLIC BLOOD PRESSURE: 106 MMHG | RESPIRATION RATE: 18 BRPM | BODY MASS INDEX: 32.23 KG/M2 | OXYGEN SATURATION: 98 % | HEIGHT: 72 IN | TEMPERATURE: 97 F

## 2023-05-11 DIAGNOSIS — G89.18 POST-OP PAIN: Primary | ICD-10-CM

## 2023-05-11 DIAGNOSIS — L98.8 FISTULA: Primary | ICD-10-CM

## 2023-05-11 LAB
GLUCOSE BLD-MCNC: 141 MG/DL (ref 70–99)
GLUCOSE BLD-MCNC: 157 MG/DL (ref 70–99)
GLUCOSE BLD-MCNC: 180 MG/DL (ref 70–99)

## 2023-05-11 PROCEDURE — 88304 TISSUE EXAM BY PATHOLOGIST: CPT | Performed by: SURGERY

## 2023-05-11 PROCEDURE — 82962 GLUCOSE BLOOD TEST: CPT

## 2023-05-11 PROCEDURE — 0JBB0ZZ EXCISION OF PERINEUM SUBCUTANEOUS TISSUE AND FASCIA, OPEN APPROACH: ICD-10-PCS | Performed by: SURGERY

## 2023-05-11 RX ORDER — ONDANSETRON 2 MG/ML
4 INJECTION INTRAMUSCULAR; INTRAVENOUS EVERY 6 HOURS PRN
Status: DISCONTINUED | OUTPATIENT
Start: 2023-05-11 | End: 2023-05-11

## 2023-05-11 RX ORDER — SODIUM CHLORIDE, SODIUM LACTATE, POTASSIUM CHLORIDE, CALCIUM CHLORIDE 600; 310; 30; 20 MG/100ML; MG/100ML; MG/100ML; MG/100ML
INJECTION, SOLUTION INTRAVENOUS CONTINUOUS
Status: DISCONTINUED | OUTPATIENT
Start: 2023-05-11 | End: 2023-05-11

## 2023-05-11 RX ORDER — 0.9 % SODIUM CHLORIDE 0.9 %
INTRAVENOUS SOLUTION INTRAVENOUS
Status: DISCONTINUED
Start: 2023-05-11 | End: 2023-05-11

## 2023-05-11 RX ORDER — PROCHLORPERAZINE EDISYLATE 5 MG/ML
5 INJECTION INTRAMUSCULAR; INTRAVENOUS EVERY 8 HOURS PRN
Status: DISCONTINUED | OUTPATIENT
Start: 2023-05-11 | End: 2023-05-11

## 2023-05-11 RX ORDER — METOCLOPRAMIDE HYDROCHLORIDE 5 MG/ML
INJECTION INTRAMUSCULAR; INTRAVENOUS AS NEEDED
Status: DISCONTINUED | OUTPATIENT
Start: 2023-05-11 | End: 2023-05-11 | Stop reason: SURG

## 2023-05-11 RX ORDER — NICOTINE POLACRILEX 4 MG
15 LOZENGE BUCCAL
Status: DISCONTINUED | OUTPATIENT
Start: 2023-05-11 | End: 2023-05-11 | Stop reason: HOSPADM

## 2023-05-11 RX ORDER — HYDROCODONE BITARTRATE AND ACETAMINOPHEN 5; 325 MG/1; MG/1
2 TABLET ORAL ONCE AS NEEDED
Status: DISCONTINUED | OUTPATIENT
Start: 2023-05-11 | End: 2023-05-11

## 2023-05-11 RX ORDER — HYDROCODONE BITARTRATE AND ACETAMINOPHEN 5; 325 MG/1; MG/1
1 TABLET ORAL ONCE AS NEEDED
Status: DISCONTINUED | OUTPATIENT
Start: 2023-05-11 | End: 2023-05-11

## 2023-05-11 RX ORDER — HYDROMORPHONE HYDROCHLORIDE 1 MG/ML
0.2 INJECTION, SOLUTION INTRAMUSCULAR; INTRAVENOUS; SUBCUTANEOUS EVERY 5 MIN PRN
Status: DISCONTINUED | OUTPATIENT
Start: 2023-05-11 | End: 2023-05-11

## 2023-05-11 RX ORDER — HEPARIN SODIUM 5000 [USP'U]/ML
5000 INJECTION, SOLUTION INTRAVENOUS; SUBCUTANEOUS ONCE
Status: COMPLETED | OUTPATIENT
Start: 2023-05-11 | End: 2023-05-11

## 2023-05-11 RX ORDER — NEOSTIGMINE METHYLSULFATE 1 MG/ML
INJECTION, SOLUTION INTRAVENOUS AS NEEDED
Status: DISCONTINUED | OUTPATIENT
Start: 2023-05-11 | End: 2023-05-11 | Stop reason: SURG

## 2023-05-11 RX ORDER — SCOLOPAMINE TRANSDERMAL SYSTEM 1 MG/1
1 PATCH, EXTENDED RELEASE TRANSDERMAL ONCE
Status: DISCONTINUED | OUTPATIENT
Start: 2023-05-11 | End: 2023-05-11 | Stop reason: HOSPADM

## 2023-05-11 RX ORDER — LABETALOL HYDROCHLORIDE 5 MG/ML
INJECTION, SOLUTION INTRAVENOUS AS NEEDED
Status: DISCONTINUED | OUTPATIENT
Start: 2023-05-11 | End: 2023-05-11 | Stop reason: SURG

## 2023-05-11 RX ORDER — ROCURONIUM BROMIDE 10 MG/ML
INJECTION, SOLUTION INTRAVENOUS AS NEEDED
Status: DISCONTINUED | OUTPATIENT
Start: 2023-05-11 | End: 2023-05-11 | Stop reason: SURG

## 2023-05-11 RX ORDER — CEFOXITIN 2 G/1
INJECTION, POWDER, FOR SOLUTION INTRAVENOUS
Status: DISCONTINUED
Start: 2023-05-11 | End: 2023-05-11

## 2023-05-11 RX ORDER — HYDROMORPHONE HYDROCHLORIDE 1 MG/ML
0.4 INJECTION, SOLUTION INTRAMUSCULAR; INTRAVENOUS; SUBCUTANEOUS EVERY 5 MIN PRN
Status: DISCONTINUED | OUTPATIENT
Start: 2023-05-11 | End: 2023-05-11

## 2023-05-11 RX ORDER — NALOXONE HYDROCHLORIDE 0.4 MG/ML
80 INJECTION, SOLUTION INTRAMUSCULAR; INTRAVENOUS; SUBCUTANEOUS AS NEEDED
Status: DISCONTINUED | OUTPATIENT
Start: 2023-05-11 | End: 2023-05-11

## 2023-05-11 RX ORDER — NICOTINE POLACRILEX 4 MG
30 LOZENGE BUCCAL
Status: DISCONTINUED | OUTPATIENT
Start: 2023-05-11 | End: 2023-05-11 | Stop reason: HOSPADM

## 2023-05-11 RX ORDER — LABETALOL HYDROCHLORIDE 5 MG/ML
5 INJECTION, SOLUTION INTRAVENOUS EVERY 5 MIN PRN
Status: DISCONTINUED | OUTPATIENT
Start: 2023-05-11 | End: 2023-05-11

## 2023-05-11 RX ORDER — ACETAMINOPHEN 500 MG
1000 TABLET ORAL ONCE
Status: DISCONTINUED | OUTPATIENT
Start: 2023-05-11 | End: 2023-05-11 | Stop reason: HOSPADM

## 2023-05-11 RX ORDER — GLYCOPYRROLATE 0.2 MG/ML
INJECTION, SOLUTION INTRAMUSCULAR; INTRAVENOUS AS NEEDED
Status: DISCONTINUED | OUTPATIENT
Start: 2023-05-11 | End: 2023-05-11 | Stop reason: SURG

## 2023-05-11 RX ORDER — LIDOCAINE HYDROCHLORIDE 10 MG/ML
INJECTION, SOLUTION EPIDURAL; INFILTRATION; INTRACAUDAL; PERINEURAL AS NEEDED
Status: DISCONTINUED | OUTPATIENT
Start: 2023-05-11 | End: 2023-05-11 | Stop reason: SURG

## 2023-05-11 RX ORDER — HEPARIN SODIUM 5000 [USP'U]/ML
INJECTION, SOLUTION INTRAVENOUS; SUBCUTANEOUS
Status: DISCONTINUED
Start: 2023-05-11 | End: 2023-05-11

## 2023-05-11 RX ORDER — BUPIVACAINE HYDROCHLORIDE 5 MG/ML
INJECTION, SOLUTION EPIDURAL; INTRACAUDAL AS NEEDED
Status: DISCONTINUED | OUTPATIENT
Start: 2023-05-11 | End: 2023-05-11 | Stop reason: HOSPADM

## 2023-05-11 RX ORDER — DEXTROSE MONOHYDRATE 25 G/50ML
50 INJECTION, SOLUTION INTRAVENOUS
Status: DISCONTINUED | OUTPATIENT
Start: 2023-05-11 | End: 2023-05-11 | Stop reason: HOSPADM

## 2023-05-11 RX ORDER — HYDROMORPHONE HYDROCHLORIDE 1 MG/ML
0.6 INJECTION, SOLUTION INTRAMUSCULAR; INTRAVENOUS; SUBCUTANEOUS EVERY 5 MIN PRN
Status: DISCONTINUED | OUTPATIENT
Start: 2023-05-11 | End: 2023-05-11

## 2023-05-11 RX ORDER — HYDROCODONE BITARTRATE AND ACETAMINOPHEN 5; 325 MG/1; MG/1
1 TABLET ORAL EVERY 6 HOURS PRN
Qty: 20 TABLET | Refills: 0 | Status: SHIPPED | OUTPATIENT
Start: 2023-05-11

## 2023-05-11 RX ORDER — ACETAMINOPHEN 500 MG
1000 TABLET ORAL ONCE AS NEEDED
Status: DISCONTINUED | OUTPATIENT
Start: 2023-05-11 | End: 2023-05-11

## 2023-05-11 RX ORDER — KETOROLAC TROMETHAMINE 30 MG/ML
INJECTION, SOLUTION INTRAMUSCULAR; INTRAVENOUS AS NEEDED
Status: DISCONTINUED | OUTPATIENT
Start: 2023-05-11 | End: 2023-05-11 | Stop reason: SURG

## 2023-05-11 RX ORDER — ONDANSETRON 2 MG/ML
INJECTION INTRAMUSCULAR; INTRAVENOUS AS NEEDED
Status: DISCONTINUED | OUTPATIENT
Start: 2023-05-11 | End: 2023-05-11 | Stop reason: SURG

## 2023-05-11 RX ADMIN — KETOROLAC TROMETHAMINE 30 MG: 30 INJECTION, SOLUTION INTRAMUSCULAR; INTRAVENOUS at 13:48:00

## 2023-05-11 RX ADMIN — SODIUM CHLORIDE, SODIUM LACTATE, POTASSIUM CHLORIDE, CALCIUM CHLORIDE: 600; 310; 30; 20 INJECTION, SOLUTION INTRAVENOUS at 13:58:00

## 2023-05-11 RX ADMIN — ONDANSETRON 4 MG: 2 INJECTION INTRAMUSCULAR; INTRAVENOUS at 13:47:00

## 2023-05-11 RX ADMIN — ROCURONIUM BROMIDE 10 MG: 10 INJECTION, SOLUTION INTRAVENOUS at 12:49:00

## 2023-05-11 RX ADMIN — SODIUM CHLORIDE, SODIUM LACTATE, POTASSIUM CHLORIDE, CALCIUM CHLORIDE: 600; 310; 30; 20 INJECTION, SOLUTION INTRAVENOUS at 14:11:00

## 2023-05-11 RX ADMIN — GLYCOPYRROLATE 0.4 MG: 0.2 INJECTION, SOLUTION INTRAMUSCULAR; INTRAVENOUS at 13:50:00

## 2023-05-11 RX ADMIN — LABETALOL HYDROCHLORIDE 5 MG: 5 INJECTION, SOLUTION INTRAVENOUS at 13:25:00

## 2023-05-11 RX ADMIN — ROCURONIUM BROMIDE 20 MG: 10 INJECTION, SOLUTION INTRAVENOUS at 13:02:00

## 2023-05-11 RX ADMIN — LIDOCAINE HYDROCHLORIDE 150 MG: 10 INJECTION, SOLUTION EPIDURAL; INFILTRATION; INTRACAUDAL; PERINEURAL at 12:48:00

## 2023-05-11 RX ADMIN — NEOSTIGMINE METHYLSULFATE 3 MG: 1 INJECTION, SOLUTION INTRAVENOUS at 13:50:00

## 2023-05-11 RX ADMIN — METOCLOPRAMIDE HYDROCHLORIDE 10 MG: 5 INJECTION INTRAMUSCULAR; INTRAVENOUS at 13:49:00

## 2023-05-11 NOTE — ANESTHESIA PROCEDURE NOTES
Airway  Date/Time: 5/11/2023 12:51 PM  Urgency: elective    Airway not difficult    General Information and Staff    Patient location during procedure: OR  Anesthesiologist: Fred Prakash MD  Performed: anesthesiologist   Performed by: Fred Prakash MD  Authorized by: Fred Prakash MD      Indications and Patient Condition  Indications for airway management: anesthesia  Sedation level: deep  Preoxygenated: yes  Patient position: sniffing  Mask difficulty assessment: 1 - vent by mask    Final Airway Details  Final airway type: endotracheal airway      Successful airway: ETT  Cuffed: yes   Successful intubation technique: direct laryngoscopy  Facilitating devices/methods: intubating stylet  Endotracheal tube insertion site: oral  Blade: Liliana  Blade size: #3  ETT size (mm): 8.0    Cormack-Lehane Classification: grade I - full view of glottis  Placement verified by: chest auscultation and capnometry   Measured from: lips  ETT to lips (cm): 22  Number of attempts at approach: 1  Ventilation between attempts: none  Number of other approaches attempted: 0

## 2023-05-17 ENCOUNTER — OFFICE VISIT (OUTPATIENT)
Dept: ENDOCRINOLOGY CLINIC | Facility: CLINIC | Age: 58
End: 2023-05-17
Payer: COMMERCIAL

## 2023-05-17 VITALS
HEART RATE: 100 BPM | WEIGHT: 235.63 LBS | SYSTOLIC BLOOD PRESSURE: 110 MMHG | DIASTOLIC BLOOD PRESSURE: 70 MMHG | BODY MASS INDEX: 32 KG/M2 | RESPIRATION RATE: 16 BRPM | OXYGEN SATURATION: 98 %

## 2023-05-17 DIAGNOSIS — E66.09 CLASS 1 OBESITY DUE TO EXCESS CALORIES WITH SERIOUS COMORBIDITY AND BODY MASS INDEX (BMI) OF 32.0 TO 32.9 IN ADULT: ICD-10-CM

## 2023-05-17 DIAGNOSIS — E78.2 MIXED HYPERLIPIDEMIA: ICD-10-CM

## 2023-05-17 DIAGNOSIS — Z79.4 TYPE 2 DIABETES MELLITUS WITH HYPERGLYCEMIA, WITH LONG-TERM CURRENT USE OF INSULIN (HCC): Primary | ICD-10-CM

## 2023-05-17 DIAGNOSIS — E11.65 TYPE 2 DIABETES MELLITUS WITH HYPERGLYCEMIA, WITH LONG-TERM CURRENT USE OF INSULIN (HCC): Primary | ICD-10-CM

## 2023-05-17 PROCEDURE — 3078F DIAST BP <80 MM HG: CPT | Performed by: NURSE PRACTITIONER

## 2023-05-17 PROCEDURE — 3074F SYST BP LT 130 MM HG: CPT | Performed by: NURSE PRACTITIONER

## 2023-05-17 PROCEDURE — 99214 OFFICE O/P EST MOD 30 MIN: CPT | Performed by: NURSE PRACTITIONER

## 2023-05-17 RX ORDER — INSULIN ASPART INJECTION 100 [IU]/ML
INJECTION, SOLUTION SUBCUTANEOUS
Qty: 30 ML | Refills: 0 | Status: SHIPPED | OUTPATIENT
Start: 2023-05-17

## 2023-05-17 RX ORDER — DAPAGLIFLOZIN AND METFORMIN HYDROCHLORIDE 5; 1000 MG/1; MG/1
2 TABLET, FILM COATED, EXTENDED RELEASE ORAL DAILY
Qty: 180 TABLET | Refills: 1 | Status: SHIPPED | OUTPATIENT
Start: 2023-05-17

## 2023-05-17 RX ORDER — PEN NEEDLE, DIABETIC 32GX 5/32"
NEEDLE, DISPOSABLE MISCELLANEOUS
Qty: 300 EACH | Refills: 3 | Status: SHIPPED | OUTPATIENT
Start: 2023-05-17

## 2023-05-19 ENCOUNTER — PATIENT MESSAGE (OUTPATIENT)
Facility: LOCATION | Age: 58
End: 2023-05-19

## 2023-05-19 LAB
CARTRIDGE LOT#: ABNORMAL NUMERIC
HEMOGLOBIN A1C: 8.2 % (ref 4.3–5.6)

## 2023-05-19 NOTE — TELEPHONE ENCOUNTER
From: Sreedhar Lo  To: Saida Alvarez MD  Sent: 5/19/2023 9:11 AM CDT  Subject: Surgery update     Good morning doctor Isak Nash, sorry to be bother you at this time, but I have a question. I wanted to keep sure if everything looked good after my surgery, the picture I uploaded is how it looks right now. I've been cleaning it 3x a day and also there's been a little pain in that area where the blue floss is, is that normal? Another thing I have a follow up with you on June 1st, is that still okay or would you like me to go in earlier? Thanks for taking your time in reading this, have a good day.

## 2023-06-01 ENCOUNTER — OFFICE VISIT (OUTPATIENT)
Facility: LOCATION | Age: 58
End: 2023-06-01

## 2023-06-01 VITALS — TEMPERATURE: 98 F | HEART RATE: 96 BPM

## 2023-06-01 DIAGNOSIS — K60.4 PERIRECTAL FISTULA: Primary | ICD-10-CM

## 2023-06-01 PROCEDURE — 99024 POSTOP FOLLOW-UP VISIT: CPT | Performed by: SURGERY

## 2023-06-22 ENCOUNTER — PATIENT MESSAGE (OUTPATIENT)
Dept: SURGERY | Facility: CLINIC | Age: 58
End: 2023-06-22

## 2023-06-22 RX ORDER — CLOTRIMAZOLE AND BETAMETHASONE DIPROPIONATE 10; .64 MG/G; MG/G
1 CREAM TOPICAL 2 TIMES DAILY PRN
Qty: 45 G | Refills: 3 | OUTPATIENT
Start: 2023-06-22

## 2023-06-22 NOTE — TELEPHONE ENCOUNTER
RN reached to patient via [facility] [address]. Circumcision on 6/28 already cancelled.

## 2023-07-17 RX ORDER — INSULIN ASPART INJECTION 100 [IU]/ML
INJECTION, SOLUTION SUBCUTANEOUS
Qty: 30 ML | Refills: 0 | Status: SHIPPED | OUTPATIENT
Start: 2023-07-17

## 2023-07-17 NOTE — TELEPHONE ENCOUNTER
Requested Prescriptions     Pending Prescriptions Disp Refills    Insulin Aspart, w/Niacinamide, (FIASP FLEXTOUCH) 100 UNIT/ML Subcutaneous Solution Pen-injector [Pharmacy Med Name: Dai Schuster PEN INJ 3ML] 30 mL 0     Sig: INJECT UP TO 30 UNITS INTO THE SKIN DAILY IN DIVIDED DOSES BASED ON BLOOD SUGAR READING     Your appointments       Date & Time Appointment Department [facility])    Sep 06, 2023  2:00 PM CDT Diabetes Pump follow up with Darrick Kayser, [address] [place] [address] Dr Mckeon, [facility]  [address] [facility] 100  [facility] [address] [phone]          Last A1c value was 8.6% done 4/28/2023.     Refill 5/17/23  LOV 5/17/23

## 2023-07-28 ENCOUNTER — LAB ENCOUNTER (OUTPATIENT)
Dept: LAB | Age: 58
End: 2023-07-28
Attending: NURSE PRACTITIONER
Payer: COMMERCIAL

## 2023-07-28 DIAGNOSIS — Z79.4 TYPE 2 DIABETES MELLITUS WITH HYPERGLYCEMIA, WITH LONG-TERM CURRENT USE OF INSULIN (HCC): ICD-10-CM

## 2023-07-28 DIAGNOSIS — E11.65 TYPE 2 DIABETES MELLITUS WITH HYPERGLYCEMIA, WITH LONG-TERM CURRENT USE OF INSULIN (HCC): ICD-10-CM

## 2023-07-28 LAB
ALBUMIN SERPL-MCNC: 3.9 G/DL (ref 3.4–5)
ALBUMIN/GLOB SERPL: 1.1 {RATIO} (ref 1–2)
ALP LIVER SERPL-CCNC: 79 U/L
ALT SERPL-CCNC: 59 U/L
ANION GAP SERPL CALC-SCNC: 5 MMOL/L (ref 0–18)
AST SERPL-CCNC: 25 U/L (ref 15–37)
BILIRUB SERPL-MCNC: 0.7 MG/DL (ref 0.1–2)
BUN BLD-MCNC: 17 MG/DL (ref 7–18)
CALCIUM BLD-MCNC: 9.2 MG/DL (ref 8.5–10.1)
CHLORIDE SERPL-SCNC: 111 MMOL/L (ref 98–112)
CHOLEST SERPL-MCNC: 249 MG/DL (ref ?–200)
CO2 SERPL-SCNC: 24 MMOL/L (ref 21–32)
CREAT BLD-MCNC: 0.82 MG/DL
EGFRCR SERPLBLD CKD-EPI 2021: 103 ML/MIN/1.73M2 (ref 60–?)
FASTING PATIENT LIPID ANSWER: YES
FASTING STATUS PATIENT QL REPORTED: YES
GLOBULIN PLAS-MCNC: 3.7 G/DL (ref 2.8–4.4)
GLUCOSE BLD-MCNC: 110 MG/DL (ref 70–99)
HDLC SERPL-MCNC: 46 MG/DL (ref 40–59)
LDLC SERPL CALC-MCNC: 177 MG/DL (ref ?–100)
NONHDLC SERPL-MCNC: 203 MG/DL (ref ?–130)
OSMOLALITY SERPL CALC.SUM OF ELEC: 292 MOSM/KG (ref 275–295)
POTASSIUM SERPL-SCNC: 3.9 MMOL/L (ref 3.5–5.1)
PROT SERPL-MCNC: 7.6 G/DL (ref 6.4–8.2)
SODIUM SERPL-SCNC: 140 MMOL/L (ref 136–145)
TRIGL SERPL-MCNC: 144 MG/DL (ref 30–149)
VLDLC SERPL CALC-MCNC: 29 MG/DL (ref 0–30)

## 2023-07-28 PROCEDURE — 80053 COMPREHEN METABOLIC PANEL: CPT | Performed by: NURSE PRACTITIONER

## 2023-07-28 PROCEDURE — 80061 LIPID PANEL: CPT | Performed by: NURSE PRACTITIONER

## 2023-08-21 RX ORDER — PEN NEEDLE, DIABETIC 32GX 5/32"
NEEDLE, DISPOSABLE MISCELLANEOUS
Qty: 300 EACH | Refills: 3 | Status: SHIPPED | OUTPATIENT
Start: 2023-08-21

## 2023-08-21 NOTE — TELEPHONE ENCOUNTER
Requested Prescriptions     Pending Prescriptions Disp Refills    BD PEN NEEDLE SHA U/F 32G X 4 MM Does not apply Misc 300 each 3     Sig: Uses up to 3  x daily     Your appointments       Date & Time Appointment Department [facility])    Sep 06, 2023  1:45 PM CDT Diabetes Pump follow up with Lady Murillo, [address] [facility] ([address] [place] [address])              [address] [place] [address] Dr Mckeon, [facility]  [address] [address]  [phone]          Last A1c value was 8.6% done 4/28/2023.     Refill 5/17/23  LOV 5/17/23

## 2023-09-04 ENCOUNTER — PATIENT MESSAGE (OUTPATIENT)
Dept: INTERNAL MEDICINE CLINIC | Facility: CLINIC | Age: 58
End: 2023-09-04

## 2023-09-05 RX ORDER — SILDENAFIL 100 MG/1
100 TABLET, FILM COATED ORAL
Qty: 30 TABLET | Refills: 11 | Status: SHIPPED | OUTPATIENT
Start: 2023-09-05

## 2023-09-05 NOTE — TELEPHONE ENCOUNTER
Last refill:  Medication Quantity Refills Start End   Sildenafil Citrate (VIAGRA) 100 MG Oral Tab () 30 tablet 11 10/14/2022 2023   Sig:   Take 1 tablet (100 mg total) by mouth as needed for Erectile Dysfunction.          LOV:   2023 Dr Martin Xiao RTC 6 months  No FOV scheduled

## 2023-09-05 NOTE — TELEPHONE ENCOUNTER
From: Landy Garay  To: Bear Harrell MD  Sent: 9/4/2023 7:45 AM CDT  Subject: Medication refill     Hi Doctor I'm running out of Sildenafil citrate and I want to know if possible you send a refill. Thanks. Best Regards. Sin Murry.

## 2023-09-06 ENCOUNTER — OFFICE VISIT (OUTPATIENT)
Dept: ENDOCRINOLOGY CLINIC | Facility: CLINIC | Age: 58
End: 2023-09-06
Payer: COMMERCIAL

## 2023-09-06 VITALS
HEART RATE: 70 BPM | DIASTOLIC BLOOD PRESSURE: 62 MMHG | OXYGEN SATURATION: 99 % | BODY MASS INDEX: 33 KG/M2 | WEIGHT: 242.63 LBS | SYSTOLIC BLOOD PRESSURE: 126 MMHG | RESPIRATION RATE: 17 BRPM

## 2023-09-06 DIAGNOSIS — E11.65 TYPE 2 DIABETES MELLITUS WITH HYPERGLYCEMIA, WITH LONG-TERM CURRENT USE OF INSULIN (HCC): Primary | ICD-10-CM

## 2023-09-06 DIAGNOSIS — E78.5 DYSLIPIDEMIA: ICD-10-CM

## 2023-09-06 DIAGNOSIS — Z79.4 TYPE 2 DIABETES MELLITUS WITH HYPERGLYCEMIA, WITH LONG-TERM CURRENT USE OF INSULIN (HCC): Primary | ICD-10-CM

## 2023-09-06 DIAGNOSIS — E66.09 CLASS 1 OBESITY DUE TO EXCESS CALORIES WITH SERIOUS COMORBIDITY AND BODY MASS INDEX (BMI) OF 32.0 TO 32.9 IN ADULT: ICD-10-CM

## 2023-09-06 LAB
CARTRIDGE LOT#: 611 NUMERIC
HEMOGLOBIN A1C: 7.2 % (ref 4.3–5.6)

## 2023-09-06 PROCEDURE — 99215 OFFICE O/P EST HI 40 MIN: CPT | Performed by: NURSE PRACTITIONER

## 2023-09-06 PROCEDURE — 3051F HG A1C>EQUAL 7.0%<8.0%: CPT | Performed by: INTERNAL MEDICINE

## 2023-09-06 PROCEDURE — 3074F SYST BP LT 130 MM HG: CPT | Performed by: NURSE PRACTITIONER

## 2023-09-06 PROCEDURE — 3078F DIAST BP <80 MM HG: CPT | Performed by: NURSE PRACTITIONER

## 2023-09-06 PROCEDURE — 83036 HEMOGLOBIN GLYCOSYLATED A1C: CPT | Performed by: NURSE PRACTITIONER

## 2023-09-06 RX ORDER — ROSUVASTATIN CALCIUM 10 MG/1
10 TABLET, COATED ORAL DAILY
COMMUNITY

## 2023-09-06 RX ORDER — SEMAGLUTIDE 0.68 MG/ML
0.5 INJECTION, SOLUTION SUBCUTANEOUS WEEKLY
Qty: 9 ML | Refills: 0 | Status: SHIPPED | OUTPATIENT
Start: 2023-09-06

## 2023-09-06 RX ORDER — SEMAGLUTIDE 0.68 MG/ML
INJECTION, SOLUTION SUBCUTANEOUS
Qty: 1 EACH | Refills: 0 | COMMUNITY
Start: 2023-09-06

## 2023-09-15 RX ORDER — INSULIN GLARGINE 300 U/ML
INJECTION, SOLUTION SUBCUTANEOUS
Qty: 18 ML | Refills: 0 | Status: SHIPPED | OUTPATIENT
Start: 2023-09-15

## 2023-11-03 ENCOUNTER — OFFICE VISIT (OUTPATIENT)
Dept: INTERNAL MEDICINE CLINIC | Facility: CLINIC | Age: 58
End: 2023-11-03
Payer: COMMERCIAL

## 2023-11-03 VITALS
SYSTOLIC BLOOD PRESSURE: 124 MMHG | DIASTOLIC BLOOD PRESSURE: 82 MMHG | HEIGHT: 72 IN | TEMPERATURE: 97 F | OXYGEN SATURATION: 96 % | WEIGHT: 230.63 LBS | BODY MASS INDEX: 31.24 KG/M2 | HEART RATE: 76 BPM | RESPIRATION RATE: 18 BRPM

## 2023-11-03 DIAGNOSIS — Z23 NEED FOR IMMUNIZATION AGAINST INFLUENZA: ICD-10-CM

## 2023-11-03 DIAGNOSIS — R53.83 FATIGUE, UNSPECIFIED TYPE: ICD-10-CM

## 2023-11-03 DIAGNOSIS — Z23 NEED FOR SHINGLES VACCINE: ICD-10-CM

## 2023-11-03 DIAGNOSIS — E11.65 TYPE 2 DIABETES MELLITUS WITH HYPERGLYCEMIA, WITH LONG-TERM CURRENT USE OF INSULIN (HCC): Chronic | ICD-10-CM

## 2023-11-03 DIAGNOSIS — Z79.4 TYPE 2 DIABETES MELLITUS WITH HYPERGLYCEMIA, WITH LONG-TERM CURRENT USE OF INSULIN (HCC): Chronic | ICD-10-CM

## 2023-11-03 DIAGNOSIS — E78.2 MIXED HYPERLIPIDEMIA: Primary | Chronic | ICD-10-CM

## 2023-11-03 DIAGNOSIS — E55.9 VITAMIN D DEFICIENCY: ICD-10-CM

## 2023-11-03 DIAGNOSIS — Z12.5 SCREENING FOR MALIGNANT NEOPLASM OF PROSTATE: ICD-10-CM

## 2023-11-03 PROBLEM — L98.8 FISTULA: Status: RESOLVED | Noted: 2023-06-29 | Resolved: 2023-11-03

## 2023-11-03 PROBLEM — R94.31 ABNORMAL EKG: Status: ACTIVE | Noted: 2023-06-29

## 2023-11-03 PROBLEM — L98.8 FISTULA: Status: ACTIVE | Noted: 2023-06-29

## 2023-11-03 PROBLEM — R94.31 ABNORMAL EKG: Status: RESOLVED | Noted: 2023-06-29 | Resolved: 2023-11-03

## 2023-11-03 PROCEDURE — 99214 OFFICE O/P EST MOD 30 MIN: CPT | Performed by: INTERNAL MEDICINE

## 2023-11-03 PROCEDURE — 90686 IIV4 VACC NO PRSV 0.5 ML IM: CPT | Performed by: INTERNAL MEDICINE

## 2023-11-03 PROCEDURE — 3008F BODY MASS INDEX DOCD: CPT | Performed by: INTERNAL MEDICINE

## 2023-11-03 PROCEDURE — 90750 HZV VACC RECOMBINANT IM: CPT | Performed by: INTERNAL MEDICINE

## 2023-11-03 PROCEDURE — 90471 IMMUNIZATION ADMIN: CPT | Performed by: INTERNAL MEDICINE

## 2023-11-03 PROCEDURE — 3074F SYST BP LT 130 MM HG: CPT | Performed by: INTERNAL MEDICINE

## 2023-11-03 PROCEDURE — 3079F DIAST BP 80-89 MM HG: CPT | Performed by: INTERNAL MEDICINE

## 2023-11-03 PROCEDURE — 90472 IMMUNIZATION ADMIN EACH ADD: CPT | Performed by: INTERNAL MEDICINE

## 2023-11-03 NOTE — PATIENT INSTRUCTIONS
- Flu shot, shingles shot given today  - Schedule COVID booster shot through [employer] for [employer]  - Get eye exam done by end of the year  - Get fasting blood tests done in the morning, at least 4-5 days before your appointment with Dr. Randa Manjarrez  - With your history of pancreatitis, make sure to stop the Ozempic and let us know if you develop any abdominal pain. - Hold your Ozempic before colonoscopy. Would take 1/2 dose of insulin day before and day of colonoscopy. Hold diabetes pills day of colonoscopy (unless Tutu Bumps tells you otherwise). - Follow up in 6 months (May) for chronic issues; follow up earlier as needed. It was a pleasure seeing you in the clinic today. Thank you for choosing the [facility] office for your healthcare needs. Please call at [phone] with any questions or concerns.     Roberto Brown MD

## 2023-11-06 ENCOUNTER — PATIENT MESSAGE (OUTPATIENT)
Dept: ENDOCRINOLOGY CLINIC | Facility: CLINIC | Age: 58
End: 2023-11-06

## 2023-11-10 ENCOUNTER — LAB ENCOUNTER (OUTPATIENT)
Dept: LAB | Age: 58
End: 2023-11-10
Attending: INTERNAL MEDICINE
Payer: COMMERCIAL

## 2023-11-10 DIAGNOSIS — Z12.5 SCREENING FOR MALIGNANT NEOPLASM OF PROSTATE: ICD-10-CM

## 2023-11-10 DIAGNOSIS — R53.83 FATIGUE, UNSPECIFIED TYPE: ICD-10-CM

## 2023-11-10 DIAGNOSIS — E11.65 TYPE 2 DIABETES MELLITUS WITH HYPERGLYCEMIA, WITH LONG-TERM CURRENT USE OF INSULIN (HCC): Chronic | ICD-10-CM

## 2023-11-10 DIAGNOSIS — E78.2 MIXED HYPERLIPIDEMIA: Chronic | ICD-10-CM

## 2023-11-10 DIAGNOSIS — E55.9 VITAMIN D DEFICIENCY: ICD-10-CM

## 2023-11-10 DIAGNOSIS — Z79.4 TYPE 2 DIABETES MELLITUS WITH HYPERGLYCEMIA, WITH LONG-TERM CURRENT USE OF INSULIN (HCC): Chronic | ICD-10-CM

## 2023-11-10 LAB
ALBUMIN SERPL-MCNC: 3.8 G/DL (ref 3.4–5)
ALBUMIN/GLOB SERPL: 1 {RATIO} (ref 1–2)
ALP LIVER SERPL-CCNC: 84 U/L
ALT SERPL-CCNC: 58 U/L
ANION GAP SERPL CALC-SCNC: 7 MMOL/L (ref 0–18)
AST SERPL-CCNC: 29 U/L (ref 15–37)
BASOPHILS # BLD AUTO: 0.06 X10(3) UL (ref 0–0.2)
BASOPHILS NFR BLD AUTO: 0.6 %
BILIRUB SERPL-MCNC: 0.6 MG/DL (ref 0.1–2)
BUN BLD-MCNC: 17 MG/DL (ref 9–23)
CALCIUM BLD-MCNC: 9 MG/DL (ref 8.5–10.1)
CHLORIDE SERPL-SCNC: 113 MMOL/L (ref 98–112)
CHOLEST SERPL-MCNC: 117 MG/DL (ref ?–200)
CO2 SERPL-SCNC: 21 MMOL/L (ref 21–32)
COMPLEXED PSA SERPL-MCNC: 1.06 NG/ML (ref ?–4)
CREAT BLD-MCNC: 0.87 MG/DL
EGFRCR SERPLBLD CKD-EPI 2021: 101 ML/MIN/1.73M2 (ref 60–?)
EOSINOPHIL # BLD AUTO: 0.25 X10(3) UL (ref 0–0.7)
EOSINOPHIL NFR BLD AUTO: 2.7 %
ERYTHROCYTE [DISTWIDTH] IN BLOOD BY AUTOMATED COUNT: 12.8 %
FASTING PATIENT LIPID ANSWER: YES
FASTING STATUS PATIENT QL REPORTED: YES
GLOBULIN PLAS-MCNC: 3.7 G/DL (ref 2.8–4.4)
GLUCOSE BLD-MCNC: 118 MG/DL (ref 70–99)
HCT VFR BLD AUTO: 50.6 %
HDLC SERPL-MCNC: 39 MG/DL (ref 40–59)
HGB BLD-MCNC: 17 G/DL
IMM GRANULOCYTES # BLD AUTO: 0.05 X10(3) UL (ref 0–1)
IMM GRANULOCYTES NFR BLD: 0.5 %
LDLC SERPL CALC-MCNC: 46 MG/DL (ref ?–100)
LYMPHOCYTES # BLD AUTO: 2.04 X10(3) UL (ref 1–4)
LYMPHOCYTES NFR BLD AUTO: 21.9 %
MCH RBC QN AUTO: 30.3 PG (ref 26–34)
MCHC RBC AUTO-ENTMCNC: 33.6 G/DL (ref 31–37)
MCV RBC AUTO: 90.2 FL
MONOCYTES # BLD AUTO: 0.57 X10(3) UL (ref 0.1–1)
MONOCYTES NFR BLD AUTO: 6.1 %
NEUTROPHILS # BLD AUTO: 6.33 X10 (3) UL (ref 1.5–7.7)
NEUTROPHILS # BLD AUTO: 6.33 X10(3) UL (ref 1.5–7.7)
NEUTROPHILS NFR BLD AUTO: 68.2 %
NONHDLC SERPL-MCNC: 78 MG/DL (ref ?–130)
OSMOLALITY SERPL CALC.SUM OF ELEC: 295 MOSM/KG (ref 275–295)
PLATELET # BLD AUTO: 236 10(3)UL (ref 150–450)
POTASSIUM SERPL-SCNC: 3.9 MMOL/L (ref 3.5–5.1)
PROT SERPL-MCNC: 7.5 G/DL (ref 6.4–8.2)
RBC # BLD AUTO: 5.61 X10(6)UL
SODIUM SERPL-SCNC: 141 MMOL/L (ref 136–145)
TRIGL SERPL-MCNC: 195 MG/DL (ref 30–149)
VIT D+METAB SERPL-MCNC: 19.4 NG/ML (ref 30–100)
VLDLC SERPL CALC-MCNC: 28 MG/DL (ref 0–30)
WBC # BLD AUTO: 9.3 X10(3) UL (ref 4–11)

## 2023-11-10 PROCEDURE — 80053 COMPREHEN METABOLIC PANEL: CPT

## 2023-11-10 PROCEDURE — 84402 ASSAY OF FREE TESTOSTERONE: CPT

## 2023-11-10 PROCEDURE — 80061 LIPID PANEL: CPT

## 2023-11-10 PROCEDURE — 84403 ASSAY OF TOTAL TESTOSTERONE: CPT

## 2023-11-10 PROCEDURE — 85025 COMPLETE CBC W/AUTO DIFF WBC: CPT

## 2023-11-10 PROCEDURE — 82306 VITAMIN D 25 HYDROXY: CPT

## 2023-11-14 RX ORDER — INSULIN GLARGINE 300 U/ML
INJECTION, SOLUTION SUBCUTANEOUS
Qty: 18 ML | Refills: 0 | Status: SHIPPED | OUTPATIENT
Start: 2023-11-14

## 2023-11-14 NOTE — TELEPHONE ENCOUNTER
Requested Prescriptions     Pending Prescriptions Disp Refills    Insulin Glargine, 2 Unit Dial, (TOUJEO MAX SOLOSTAR) 300 UNIT/ML Subcutaneous Solution Pen-injector [Pharmacy Med Name: TOUJEO MAX SOLOSTAR 300U/ML PEN 3ML] 18 mL 0     Sig: INJECT UP TO 60 UNITS DAILY AS DIRECTED     Last A1c value was 7.2% done 9/6/2023.   Refill 9/15/23  LOV 9/6/23

## 2023-11-15 LAB
FREE TESTOST DIRECT: 3.9 PG/ML
TESTOSTERONE: 203 NG/DL

## 2023-11-28 ENCOUNTER — PATIENT MESSAGE (OUTPATIENT)
Dept: INTERNAL MEDICINE CLINIC | Facility: CLINIC | Age: 58
End: 2023-11-28

## 2023-11-28 RX ORDER — ERGOCALCIFEROL 1.25 MG/1
50000 CAPSULE ORAL WEEKLY
Qty: 12 CAPSULE | Refills: 1 | Status: SHIPPED | OUTPATIENT
Start: 2023-11-28

## 2023-11-28 NOTE — TELEPHONE ENCOUNTER
From: Az Perez  To: Saira Clay  Sent: 11/28/2023 8:41 AM CST  Subject: Parul Bhatia Doctor, I called to my pharmacy to check if I have already prescription for Vitamin D, but they said don't receive anything yet from your office. Best Regards. Prashant.

## 2023-11-28 NOTE — TELEPHONE ENCOUNTER
RP, rx for vitamin d was not sent. See [facility] to pt below from 11/10/23. RX pending if appropriate. TY! Hello,  Here are your lab results. Vitamin D is low - will send in prescription. Triglycerides a little high. Keep an eye on carbohydrates, fried/fatty foods. Other cholesterol tests fine. Blood counts, kidney, liver, prostate tests normal.  Testosterone test still pending. Let me know if you have questions about these results. Take care,  Dr. Connor Adkins,  Testosterone levels are on the low end. I recommend follow up with our Urology clinic for this:  Dr. Elijah Avila, and [facility], [address], [address]  [phone]  Let me know if you have questions about these results.   Take care,  Dr. Baez Ran   Written by Laron Seo MD on 11/16/2023 12:09 PM CST

## 2024-01-05 ENCOUNTER — TELEPHONE (OUTPATIENT)
Dept: SURGERY | Facility: CLINIC | Age: 59
End: 2024-01-05

## 2024-01-05 ENCOUNTER — LAB ENCOUNTER (OUTPATIENT)
Dept: LAB | Age: 59
End: 2024-01-05
Attending: SURGERY
Payer: COMMERCIAL

## 2024-01-05 ENCOUNTER — OFFICE VISIT (OUTPATIENT)
Dept: SURGERY | Facility: CLINIC | Age: 59
End: 2024-01-05
Payer: COMMERCIAL

## 2024-01-05 DIAGNOSIS — N48.1 BALANITIS: Primary | ICD-10-CM

## 2024-01-05 DIAGNOSIS — N52.9 ERECTILE DYSFUNCTION OF ORGANIC ORIGIN: ICD-10-CM

## 2024-01-05 DIAGNOSIS — E34.9 TESTOSTERONE DEFICIENCY: Primary | ICD-10-CM

## 2024-01-05 DIAGNOSIS — N40.1 BPH WITH OBSTRUCTION/LOWER URINARY TRACT SYMPTOMS: ICD-10-CM

## 2024-01-05 DIAGNOSIS — E34.9 TESTOSTERONE DEFICIENCY: ICD-10-CM

## 2024-01-05 DIAGNOSIS — N13.8 BPH WITH OBSTRUCTION/LOWER URINARY TRACT SYMPTOMS: ICD-10-CM

## 2024-01-05 LAB
ERYTHROCYTE [DISTWIDTH] IN BLOOD BY AUTOMATED COUNT: 13.2 %
ESTRADIOL SERPL-MCNC: 43.5 PG/ML
FSH SERPL-ACNC: 6.5 MIU/ML
HCT VFR BLD AUTO: 52.6 %
HGB BLD-MCNC: 17.5 G/DL
LH SERPL-ACNC: 4 MIU/ML
MCH RBC QN AUTO: 29.9 PG (ref 26–34)
MCHC RBC AUTO-ENTMCNC: 33.3 G/DL (ref 31–37)
MCV RBC AUTO: 89.9 FL
PLATELET # BLD AUTO: 218 10(3)UL (ref 150–450)
PSA SERPL-MCNC: 1.05 NG/ML (ref ?–4)
RBC # BLD AUTO: 5.85 X10(6)UL
TESTOST SERPL-MCNC: 400.39 NG/DL
WBC # BLD AUTO: 8.3 X10(3) UL (ref 4–11)

## 2024-01-05 PROCEDURE — 85027 COMPLETE CBC AUTOMATED: CPT | Performed by: SURGERY

## 2024-01-05 PROCEDURE — 99214 OFFICE O/P EST MOD 30 MIN: CPT | Performed by: SURGERY

## 2024-01-05 PROCEDURE — 84153 ASSAY OF PSA TOTAL: CPT

## 2024-01-05 PROCEDURE — 83002 ASSAY OF GONADOTROPIN (LH): CPT

## 2024-01-05 PROCEDURE — 83001 ASSAY OF GONADOTROPIN (FSH): CPT

## 2024-01-05 PROCEDURE — 36415 COLL VENOUS BLD VENIPUNCTURE: CPT

## 2024-01-05 PROCEDURE — 84403 ASSAY OF TOTAL TESTOSTERONE: CPT | Performed by: SURGERY

## 2024-01-05 PROCEDURE — 82670 ASSAY OF TOTAL ESTRADIOL: CPT | Performed by: SURGERY

## 2024-01-05 RX ORDER — TADALAFIL 20 MG/1
TABLET ORAL
Qty: 30 TABLET | Refills: 5 | Status: SHIPPED | OUTPATIENT
Start: 2024-01-05

## 2024-01-05 NOTE — PROGRESS NOTES
Urology Clinic Note    Primary Care Provider:  Jeana Anderson MD     Chief Complaint:   Balanitis, phimosis, low testosterone, ED, BPH/LUTS    HPI:   Prashant Patel is a 58 year old male with history of alcoholic pancreatitis, nephrolithiasis, diabetes (A1c 8.0), thrombocytopenia referred for penile pain.  He notes that over the past few months he has had worsening penile discomfort and skin dryness and itching.  He has tried Lotrisone cream but this really has not made things better.  He has some trouble pulling back the foreskin at times.  When he has erections it is often uncomfortable.  He also endorses mild weak urinary stream.  Denies urinary frequency or urgency.  Denies gross hematuria.     I saw him on 3/31/2023 and at that time he had significant foreskin balanitis and mild phimosis with a tethered frenulum.  I recommended proceeding to the OR for circumcision and frenulotomy but this has not yet been scheduled.  He continues to have issues with mild phimosis and tearing of the frenulum and is interested in proceeding with surgery.  I started tamsulosin 0.4 mg nightly for mild obstructive LUTS.  He notices improvement in his urinary stream on tamsulosin.    He returns today in follow-up.  He also endorses low energy and erectile dysfunction.  He has tried Viagra with minimal success.  Testosterone from 11/10/2023 was 203.    His most recent hematocrit was 50.6.    AUA symptom score is 6/mixed with LUTS.    PSA:  Lab Results   Component Value Date    PSAS 1.06 11/10/2023    PSAS 0.96 07/09/2021    PSAS 1.02 06/14/2019        History:     Past Medical History:   Diagnosis Date    Abnormal EKG     Alcohol withdrawal syndrome with complication (HCC)     Alcoholic pancreatitis 08/2018    Calculus of kidney     COVID-19 virus infection 04/23/2020    Diabetes (HCC) 08/14/2018    Fistula     Iron deficiency anemia, unspecified 12/21/2018    Normocytic anemia     Sinus tachycardia     Thrombocytopenia  (HCC)     Visual impairment     glasses       Past Surgical History:   Procedure Laterality Date    COLONOSCOPY  10/22/2018    CYSTOSCOPY,INSERT URETERAL STENT      kidney stones x2    OTHER SURGICAL HISTORY  10 -     Bump neck    OTHER SURGICAL HISTORY      surgery in 2011 and 2015 on anus    VASECTOMY         Family History   Problem Relation Age of Onset    Other (Cancer) Mother         Stomach     Cancer Mother     Diabetes Mother        Social History     Socioeconomic History    Marital status:    Tobacco Use    Smoking status: Never    Smokeless tobacco: Never   Vaping Use    Vaping Use: Never used   Substance and Sexual Activity    Alcohol use: No     Comment: Stopped drinking alcohol 8/7/2018    Drug use: No   Other Topics Concern    Caffeine Concern No    Exercise Yes     Comment: Walk    Seat Belt No    Special Diet No    Stress Concern No    Weight Concern No       Medications (Active prior to today's visit):  Current Outpatient Medications   Medication Sig Dispense Refill    ergocalciferol 1.25 MG (78225 UT) Oral Cap Take 1 capsule (50,000 Units total) by mouth once a week. 12 capsule 1    Insulin Glargine, 2 Unit Dial, (TOUJEO MAX SOLOSTAR) 300 UNIT/ML Subcutaneous Solution Pen-injector INJECT UP TO 60 UNITS DAILY AS DIRECTED 18 mL 0    PEG 3350-KCl-Na Bicarb-NaCl 420 g Oral Recon Soln Take as directed by physician 4000 mL 0    rosuvastatin 10 MG Oral Tab Take 1 tablet (10 mg total) by mouth daily.      Sildenafil Citrate 100 MG Oral Tab Take 1 tablet (100 mg total) by mouth daily as needed for Erectile Dysfunction. 30 tablet 11    BD PEN NEEDLE SHA U/F 32G X 4 MM Does not apply Misc Uses up to 3  x daily 300 each 3    Insulin Aspart, w/Niacinamide, (FIASP FLEXTOUCH) 100 UNIT/ML Subcutaneous Solution Pen-injector INJECT UP TO 30 UNITS INTO THE SKIN DAILY IN DIVIDED DOSES BASED ON BLOOD SUGAR READING 30 mL 0    Dapagliflozin-metFORMIN HCl ER (XIGDUO XR) 5-1000 MG Oral Tablet 24 Hr Take 2  tablets by mouth daily. 180 tablet 1    clotrimazole-betamethasone 1-0.05 % External Cream Apply 1 Application. topically 2 (two) times daily as needed. 45 g 3    tamsulosin 0.4 MG Oral Cap Take 1 capsule (0.4 mg total) by mouth every evening. 90 capsule 6       Allergies:  No Known Allergies    Review of Systems:   A comprehensive 10-point review of systems was completed.  Pertinent positives and negatives are noted in the the HPI.    Physical Exam:   CONSTITUTIONAL: Well developed, well nourished, in no acute distress  NEUROLOGIC: Alert and oriented  HEAD: Normocephalic, atraumatic  EYES: Sclera non-icteric  ENT: Hearing intact, moist mucous membranes  NECK: No obvious goiter or masses  RESPIRATORY: Normal respiratory effort  SKIN: No evident rashes  ABDOMEN: Soft, non-tender, non-distended  GENITOURINARY: Normal phallus, mild phimosis, moderate balanitis, tethered frenulum, normal testicles    Assessment & Plan:   Prashant Patel is a 58 year old male with history of alcoholic pancreatitis, nephrolithiasis, diabetes (A1c 8.0), thrombocytopenia referred for penile pain.  He notes that over the past few months he has had worsening penile discomfort and skin dryness and itching.  He has tried Lotrisone cream but this really has not made things better.  He has some trouble pulling back the foreskin at times.  When he has erections it is often uncomfortable.  He also endorses mild weak urinary stream.  Denies urinary frequency or urgency.  Denies gross hematuria.     I saw him on 3/31/2023 and at that time he had significant foreskin balanitis and mild phimosis with a tethered frenulum.  I recommended proceeding to the OR for circumcision and frenulotomy but this has not yet been scheduled.  He continues to have issues with mild phimosis and tearing of the frenulum and is interested in proceeding with surgery.  I started tamsulosin 0.4 mg nightly for mild obstructive LUTS.  He notices improvement in his urinary  stream on tamsulosin.    He returns today in follow-up.  He also endorses low energy and erectile dysfunction.  He has tried Viagra with minimal success.  Testosterone from 11/10/2023 was 203.  His most recent hematocrit was 50.6.    -Repeat testosterone, FSH, LH, estradiol, CBC  -OR for circumcision, frenulotomy  -Continue tamsulosin  -Stop Viagra, start Cialis    In total, 30 minutes were spent on this patient encounter (including chart review, patient history, physical, and counseling, documentation, and communication).    Link Brito MD  Staff Urologist  [facility]  Office: [phone]

## 2024-01-05 NOTE — TELEPHONE ENCOUNTER
Hi,     Can you please schedule this patient for surgery.     Thanks,  Link      Urology Surgery Request  Surgeon: Link Brito  Location (if known): [facility]  Procedure: Circumcision   Anesthesia: General   Time Frame: Next available  Time required: 60 minutes  Diagnosis: Balanitis  Special Equipment: None     Antibiotics: per hospital protocol unless checked below               ___ Levaquin 500 mg IV               ___ Gemcitabine 2 g/100 mL NS bladder instillation to be given in OR     Estimated Post Op/Follow Up Appt:   4 weeks for postop. Please schedule appointment at time of surgery scheduling.

## 2024-01-05 NOTE — PROGRESS NOTES
PSA 1.05, testosterone 400.39, normal FSH, LH, estradiol.      Hi Prashant,    I have reviewed your test results. Your repeat testosterone and other labs are normal. I would not recommend any testosterone therapy at this time. Please let me know if you have any questions.    Thanks,  Link Brito MD

## 2024-01-06 ENCOUNTER — IMMUNIZATION (OUTPATIENT)
Dept: LAB | Age: 59
End: 2024-01-06
Attending: EMERGENCY MEDICINE
Payer: COMMERCIAL

## 2024-01-06 DIAGNOSIS — Z23 NEED FOR VACCINATION: Primary | ICD-10-CM

## 2024-01-06 PROCEDURE — 90480 ADMN SARSCOV2 VAC 1/ONLY CMP: CPT

## 2024-01-08 NOTE — TELEPHONE ENCOUNTER
Spoke with the patient.  Scheduling the surgery for  3/18/24. I also scheduled a 4 week f/u for 4/19/24 @10am.   Reviewed the pre-op instructions and will send via My Chart or mail to patient once confirmed.  Patient can contact me at [phone]

## 2024-01-12 PROBLEM — Z12.11 SPECIAL SCREENING FOR MALIGNANT NEOPLASM OF COLON: Status: ACTIVE | Noted: 2024-01-12

## 2024-01-12 PROBLEM — D12.2 BENIGN NEOPLASM OF ASCENDING COLON: Status: ACTIVE | Noted: 2024-01-12

## 2024-01-12 PROBLEM — D12.7 BENIGN NEOPLASM OF RECTOSIGMOID JUNCTION: Status: ACTIVE | Noted: 2024-01-12

## 2024-01-17 DIAGNOSIS — E11.65 TYPE 2 DIABETES MELLITUS WITH HYPERGLYCEMIA, WITH LONG-TERM CURRENT USE OF INSULIN (HCC): Chronic | ICD-10-CM

## 2024-01-17 DIAGNOSIS — Z79.4 TYPE 2 DIABETES MELLITUS WITH HYPERGLYCEMIA, WITH LONG-TERM CURRENT USE OF INSULIN (HCC): Chronic | ICD-10-CM

## 2024-01-17 DIAGNOSIS — E78.2 MIXED HYPERLIPIDEMIA: Primary | Chronic | ICD-10-CM

## 2024-01-17 RX ORDER — INSULIN GLARGINE 300 U/ML
INJECTION, SOLUTION SUBCUTANEOUS
Qty: 18 ML | Refills: 0 | Status: SHIPPED | OUTPATIENT
Start: 2024-01-17

## 2024-01-17 NOTE — TELEPHONE ENCOUNTER
Requested Prescriptions     Pending Prescriptions Disp Refills    Insulin Glargine, 2 Unit Dial, (TOUJEO MAX SOLOSTAR) 300 UNIT/ML Subcutaneous Solution Pen-injector 18 mL 0     Sig: INJECT UP TO 60 UNITS DAILY AS DIRECTED     Your appointments       Date & Time Appointment Department ([place])    Feb 07, 2024  3:00 PM CST Diabetes Pump follow up with Beverly Preston APRN [facility] ([facility])        Apr 19, 2024 10:00 AM CDT Post Op Visit with Link Brito MD [facility] ([facility] [place] [facility])              [facility]  [address]  [facility] [address]  [phone] [facility]  [address]  [facility] [id number] [phone]          Last A1c value was 7.2% done 9/6/2023.    Refill 11/14/23  LOV 9/6/23

## 2024-01-17 NOTE — TELEPHONE ENCOUNTER
rosuvastatin 10 MG Oral Tab         Sig: Take 1 tablet (10 mg total) by mouth daily.    Disp: Not specified    Refills: 0    Start: 1/17/2024    Class: Normal    Non-formulary    Last ordered: 4 months ago (9/6/2023) by Reported External/Patient    Cholesterol Medication Protocol [id number]/17/2024 08:17 AM   Protocol Details ALT < 80    ALT resulted within past year    Lipid panel within past 12 months    Appointment within past 12 or next 3 months      To be filled at: [id number] - [place], [place] - [address] , [phone], [phone]

## 2024-01-18 RX ORDER — ROSUVASTATIN CALCIUM 10 MG/1
10 TABLET, COATED ORAL DAILY
Qty: 90 TABLET | Refills: 3 | Status: SHIPPED | OUTPATIENT
Start: 2024-01-18

## 2024-02-07 ENCOUNTER — OFFICE VISIT (OUTPATIENT)
Dept: ENDOCRINOLOGY CLINIC | Facility: CLINIC | Age: 59
End: 2024-02-07
Payer: COMMERCIAL

## 2024-02-07 VITALS
HEART RATE: 86 BPM | DIASTOLIC BLOOD PRESSURE: 60 MMHG | BODY MASS INDEX: 31 KG/M2 | WEIGHT: 226.38 LBS | OXYGEN SATURATION: 98 % | SYSTOLIC BLOOD PRESSURE: 110 MMHG | RESPIRATION RATE: 16 BRPM

## 2024-02-07 DIAGNOSIS — E78.2 MIXED HYPERLIPIDEMIA: Chronic | ICD-10-CM

## 2024-02-07 DIAGNOSIS — Z79.4 TYPE 2 DIABETES MELLITUS WITH HYPERGLYCEMIA, WITH LONG-TERM CURRENT USE OF INSULIN (HCC): Primary | ICD-10-CM

## 2024-02-07 DIAGNOSIS — E11.65 TYPE 2 DIABETES MELLITUS WITH HYPERGLYCEMIA, WITH LONG-TERM CURRENT USE OF INSULIN (HCC): Primary | ICD-10-CM

## 2024-02-07 DIAGNOSIS — E66.9 OBESITY (BMI 30-39.9): ICD-10-CM

## 2024-02-07 LAB
CARTRIDGE LOT#: 617 NUMERIC
CREAT UR-SCNC: 90.9 MG/DL
HEMOGLOBIN A1C: 9.1 % (ref 4.3–5.6)
MICROALBUMIN UR-MCNC: 0.74 MG/DL
MICROALBUMIN/CREAT 24H UR-RTO: 8.1 UG/MG (ref ?–30)

## 2024-02-07 PROCEDURE — 82570 ASSAY OF URINE CREATININE: CPT | Performed by: NURSE PRACTITIONER

## 2024-02-07 PROCEDURE — 3078F DIAST BP <80 MM HG: CPT | Performed by: NURSE PRACTITIONER

## 2024-02-07 PROCEDURE — 82043 UR ALBUMIN QUANTITATIVE: CPT | Performed by: NURSE PRACTITIONER

## 2024-02-07 PROCEDURE — 3074F SYST BP LT 130 MM HG: CPT | Performed by: NURSE PRACTITIONER

## 2024-02-07 PROCEDURE — 83036 HEMOGLOBIN GLYCOSYLATED A1C: CPT | Performed by: NURSE PRACTITIONER

## 2024-02-07 PROCEDURE — 99214 OFFICE O/P EST MOD 30 MIN: CPT | Performed by: NURSE PRACTITIONER

## 2024-02-07 RX ORDER — INSULIN ASPART INJECTION 100 [IU]/ML
INJECTION, SOLUTION SUBCUTANEOUS
Qty: 15 ML | Refills: 0 | Status: SHIPPED | OUTPATIENT
Start: 2024-02-07

## 2024-02-07 RX ORDER — PEN NEEDLE, DIABETIC 32GX 5/32"
NEEDLE, DISPOSABLE MISCELLANEOUS
Qty: 300 EACH | Refills: 3 | Status: SHIPPED | OUTPATIENT
Start: 2024-02-07

## 2024-02-07 RX ORDER — TIRZEPATIDE 2.5 MG/.5ML
2.5 INJECTION, SOLUTION SUBCUTANEOUS WEEKLY
Qty: 6 ML | Refills: 0 | Status: SHIPPED | OUTPATIENT
Start: 2024-02-07

## 2024-02-07 RX ORDER — TIRZEPATIDE 2.5 MG/.5ML
1 INJECTION, SOLUTION SUBCUTANEOUS AS DIRECTED
Qty: 2 ML | Refills: 0 | COMMUNITY
Start: 2024-02-07

## 2024-02-07 NOTE — PROGRESS NOTES
Prashant Patel is a 58 year old  presents today for type 2 diabetes management.   Primary care physician: Jeana Anderson MD     Most  A1C 7.2 % ( last A1C 8.6%)   Last DM appt , started ozempic however pt messaged me  reporting GI side effects  ( severe nausea for 3 d after each injection)   Pt feels he did really well at 0.25mg dose - but sxs started at the o.5m ozempic dose  He felt the 0.25mg dose was helping his bg trends     Using livongo through insurance program (NOT interested in personal sensor: cost)       fasting 131- 170  mg /dl   Only testing fasting    Seeing GI; had c-scope 1-    Now having surgery for circumcision March 18th ; asking for pre op instructions       Followed by  [facility] ;Dr Hanson          Diabetes History:  Type 2 DM ~   Patient has not had hospitalizations for blood sugar issues  POSITIVE history of pancreatitis ( ETOH induced) 2018 ; confirmed by Lipase/imaging      Previous DM therapies:  levemir ; change in therapy   [employer] insurance formulary 2022   Januvia + panreatitis 2018   Ozempic: severe nausea , abd pain sxs      Current DM Regimen:  Xigduo XR 5-1000mg : 2 tab daily   Glipizide XL 5mg once daily in AM (on hold since )     Toujeo max solostar: 30 units once daily      Fiasp (aspart) scale before meals 3 x daily       If blood sugar is 175-200 dose 2 units of insulin   If blood sugar is 201-250  dose 3 units of insulin  If blood sugar is over 251, dose 4units of insulin           HGBA1C:    Lab Results   Component Value Date    A1C 7.2 (A) 09/06/2023    A1C 8.6 (H) 04/28/2023    A1C 8.0 (A) 02/15/2023     (H) 04/28/2023         Lab Results   Component Value Date    CHOLEST 117 11/10/2023    CHOLEST 249 (H) 07/28/2023    TRIG 195 (H) 11/10/2023    TRIG 144 07/28/2023    HDL 39 (L) 11/10/2023    HDL 46 07/28/2023    LDL 46 11/10/2023     (H) 07/28/2023     Lab Results   Component Value Date    MICROALBCREA 13.4  02/15/2023    MICROALBCREA 10.2 06/15/2022      Lab Results   Component Value Date    CREATSERUM 0.87 11/10/2023    CREATSERUM 0.82 07/28/2023    EGFRCR 101 11/10/2023    EGFRCR 103 07/28/2023     Lab Results   Component Value Date    AST 29 11/10/2023    AST 25 07/28/2023    ALT 58 11/10/2023    ALT 59 07/28/2023       Lab Results   Component Value Date    TSH 0.505 07/09/2021    TSH 0.412 02/07/2020    T4F 1.0 08/08/2018           DM Complications:  Microvascular:   Neuropathy: yes  Retinopathy: yes  Nephropathy: no    Macrovascular:  PVD: no  CAD: no  Stroke/CVA: no        Modifying factors:  Medication adherence: yes   Recent steroids, illness or infections ( past 3m): no     Allergies: Patient has no known allergies.    Past Medical History:   Diagnosis Date    Abnormal EKG     Alcohol withdrawal syndrome with complication (HCC)     Alcoholic pancreatitis 08/2018    Calculus of kidney     COVID-19 virus infection 04/23/2020    Diabetes (HCC) 08/14/2018    Fistula     Iron deficiency anemia, unspecified 12/21/2018    Normocytic anemia     Sinus tachycardia     Thrombocytopenia (HCC)     Visual impairment     glasses     Past Surgical History:   Procedure Laterality Date    COLONOSCOPY  10/22/2018    CYSTOSCOPY,INSERT URETERAL STENT      kidney stones x2    OTHER SURGICAL HISTORY  10 -     Bump neck    OTHER SURGICAL HISTORY      surgery in 2011 and 2015 on anus    VASECTOMY       Social History     Socioeconomic History    Marital status:    Tobacco Use    Smoking status: Never    Smokeless tobacco: Never   Vaping Use    Vaping Use: Never used   Substance and Sexual Activity    Alcohol use: No     Comment: Stopped drinking alcohol 8/7/2018    Drug use: No   Other Topics Concern    Caffeine Concern No    Exercise Yes     Comment: Walk    Seat Belt No    Special Diet No    Stress Concern No    Weight Concern No     Family History   Problem Relation Age of Onset    Other (Cancer) Mother          Stomach     Cancer Mother     Diabetes Mother      Current Medication List:   Current Outpatient Medications   Medication Sig Dispense Refill    Tirzepatide (MOUNJARO) 2.5 MG/0.5ML Subcutaneous Solution Pen-injector Inject 2.5 mg into the skin once a week. DX E11.9 6 mL 0    Insulin Aspart, w/Niacinamide, (FIASP FLEXTOUCH) 100 UNIT/ML Subcutaneous Solution Pen-injector Uses up to 20 units daily as directed before meals based on blood sugar reading 15 mL 0    BD PEN NEEDLE SHA U/F 32G X 4 MM Does not apply Misc Uses up to 3  x daily 300 each 3    Tirzepatide (MOUNJARO) 2.5 MG/0.5ML Subcutaneous Solution Pen-injector Inject 1 each into the skin As Directed. 2 mL 0    rosuvastatin 10 MG Oral Tab Take 1 tablet (10 mg total) by mouth daily. 90 tablet 3    Insulin Glargine, 2 Unit Dial, (TOUJEO MAX SOLOSTAR) 300 UNIT/ML Subcutaneous Solution Pen-injector INJECT UP TO 60 UNITS DAILY AS DIRECTED 18 mL 0    ergocalciferol 1.25 MG (22007 UT) Oral Cap Take 1 capsule (50,000 Units total) by mouth once a week. 12 capsule 1    Dapagliflozin-metFORMIN HCl ER (XIGDUO XR) 5-1000 MG Oral Tablet 24 Hr Take 2 tablets by mouth daily. 180 tablet 1    tamsulosin 0.4 MG Oral Cap Take 1 capsule (0.4 mg total) by mouth every evening. 90 capsule 6    Tadalafil (CIALIS) 20 MG Oral Tab Take 0.5-1 tablets (10-20 mg total) by mouth daily as needed for Erectile Dysfunction. 30 tablet 5    PEG 3350-KCl-Na Bicarb-NaCl 420 g Oral Recon Soln Take as directed by physician 4000 mL 0    clotrimazole-betamethasone 1-0.05 % External Cream Apply 1 Application. topically 2 (two) times daily as needed. 45 g 3           DM associated review of  symptoms:   Endocrine: Polyuria, polyphagia, polydipsia: no  Neurological: Paresthesias: no  HEENT: Blurred vision: no  Skin: + groin rash . NO  wounds  Hematological: Hypoglycemia: no      Review of Systems     LUNGS: denies shortness of breath   CARDIOVASCULAR: denies chest pain  GI: denies abdominal pain, nausea or  diarrhea   : denies dysuria  MUSCULOSKELETAL: denies pain        Physical exam:  /60   Pulse 86   Resp 16   Wt 226 lb 6.4 oz (102.7 kg)   SpO2 98%   BMI 30.71 kg/m²   Body mass index is 30.71 kg/m².    Physical Exam   Vitals reviewed.  Constitutional: Normal appearance   Cardiovascular: Normal rate   Pulmonary/Chest: Effort normal  Neurological: Alert and oriented .   Psychiatric: Normal mood and affect.       Assessment/Plan:    Class 1 obesity due to excess calories with serious comorbidity   Continue to focus on improving diet, lifestyle modifications   Did not tolerate Ozempic GLP1- desires to trial Mounjaro ; see DM       Dyslipidemia:   Last  labs  8-2023  -  Cardiology started Rosuvastatin 10mg once [place] (per  Dr ponce       Type 2 diabetes mellitus with hyperglycemia, with long-term current use of insulin ([facility])  A1C 9.1 % (last A1C 7.2%)   Weight: 226   lb today (last weight 235 lb )   Diabetes control is improving but needs ongoing management and evaluation  given the chronicity of Diabetes, ongoing medication monitoring and risk for complications       Again reminded pt health impact associated with poor glycemic control and the importance of better glucose control to prevent onset /progression of DM complications.   Desires to revisit GLP; will try Mounjaro GIP/GLP: 2.5mg once weekly   Will hold off on dose titration at this time ~          Continue    Xigduo XR 5-1000mg  2 tab daily    Toujeo max solostar: 30  units once daily  Fiasp (aspart) scale before meals 3 x daily     If blood sugar is 175-200 dose 2 units of insulin   If blood sugar is 201-250  dose 3 units of insulin  If blood sugar is over 251, dose 4units of insulin     Pre op instructions:     3days before surgery (may 14th)   hold Xigduo --> resume post op   Decrease Toujeo to 28 units once daily --> resume 30 units post op     If you have drink the 2 gatorades per anesthesia: please  follow Fiasp dose scale   Test blood sugar  before gatorade:   If blood sugar is     If blood sugar is 175-200 dose 2 units of insulin   If blood sugar is 201-250  dose 3 units of insulin  If blood sugar is over 251, dose 4units of insulin           Reminded w SGLT2 importance of staying hydrated. Reminded to drink at least 4 eight ounce glasses of water daily to avoid dehydration.    Continue site rotation for subcutaneous injections     Not interested in CGM (cost) - employer provides [employer] meter/supplies free of charge.   Reviewed clinical significance of A1c, adverse effects of suboptimal glucose control, and goals of therapy   Reviewed the A1C test, what the value reflects and the goal for the patient.   Reminded pt on A1C and blood sugar targets (Fasting < 130 and post prandial <180 ) and complications associated with hyperglycemia and uncontrolled DM (on AVS)   Recommended SMBG 3- x daily if not using CGM   Reviewed s/s and treatment of hypoglycemia (on AVS)   Continue with lifestyle modifications since they have positive impact on diabetes/blood sugars/health (portion control, physical activity, weight loss)   Reinforced timing and adherence with medication, self-monitoring of blood glucose and routine follow up  U m/alb collected today     The patient is asked to return in- 3 m . Recommended to contact DM clinic sooner if questions or concerns.    The patient indicates understanding of these issues and agrees to the plan.      Orders Placed This Encounter    HEMOGLOBIN A1C     Order Specific Question:   Release to patient     Answer:   Immediate    Microalb/Creat Ratio, Random Urine     Standing Status:   Future     Number of Occurrences:   1     Standing Expiration Date:   2/7/2025     Order Specific Question:   Release to patient     Answer:   Immediate    Tirzepatide (MOUNJARO) 2.5 MG/0.5ML Subcutaneous Solution Pen-injector     Sig: Inject 2.5 mg into the skin once a week. DX E11.9     Dispense:  6 mL     Refill:  0    Insulin Aspart,  w/Niacinamide, (FIASP FLEXTOUCH) 100 UNIT/ML Subcutaneous Solution Pen-injector     Sig: Uses up to 20 units daily as directed before meals based on blood sugar reading     Dispense:  15 mL     Refill:  0     ZERO refills remain on this prescription. Your patient is requesting advance approval of refills for this medication to PREVENT ANY MISSED DOSES    BD PEN NEEDLE SHA U/F 32G X 4 MM Does not apply Misc     Sig: Uses up to 3  x daily     Dispense:  300 each     Refill:  3     E11.9 ...    Tirzepatide (MOUNJARO) 2.5 MG/0.5ML Subcutaneous Solution Pen-injector     Sig: Inject 1 each into the skin As Directed.     Dispense:  2 mL     Refill:  0     LOT [id number] EX 2025 1 box     DM quality  A1C/Blood pressure: as reported above   Nephropathy screenin  no indication for  ace /arb rx.   LIPID screenin Rosuvastatin rx.   Last dilated eye exam: No data recorded   Exam shows retinopathy? No data recorded  Last diabetic foot exam: Last Foot Exam: 23    Defer foot exam to follow up appointment     Lens chris: has upcoming appt ()     The risks and benefits of my recommendations, as well as other treatment options were discussed with the patient today. questions were also answered to the best of my knowledge.

## 2024-02-07 NOTE — PATIENT INSTRUCTIONS
Lets revisit the gut hormone     Mounjaro is a once weekly combination gut hormone (GIP and GLP)   GLP medications have been on the market since 2005    GIP hormones, also known as Gastric Inhibitory Polypeptide have been researched for many years -- as it relates to diabetes/insulin secretion as well as obesity. These hormones drive the incretin effect Mounjaro is the first molecule approved by FDA that offers BOTH, GLP and GIP hormones.         It works in the following ways:     Helps the body release insulin when the blood sugar is high - after eating   Helps the body remove excess sugar from the blood  Stops the liver from making and releasing too much sugar   Lowers your appetite   Slows down how quickly the food leaves the stomach -resulting in you feeling full for longer time periods.       Please call if you are having significant nausea, diarrhea or a pain in your stomach that will not go away    Start Mounjaro 2.5mg once weekly      Continue;     Xigduo XR 5-1000mg : 2 tab daily   Toujeo max solostar: 30 units once daily      Fiasp (aspart) scale before meals 3 x daily       If blood sugar is 175-200 dose 2 units of insulin   If blood sugar is 201-250  dose 3 units of insulin  If blood sugar is over 251, dose 4units of insulin       Try to check some blood sugars after dinner or before bedtime    Surgery instruction   3days before surgery (may 14th) hold Xigduo   Decrease Toujeo to 28 units once daily     If you have drink the 2 gatorades, follow Fiasp dose scale   Test blood sugar before gatorade:   If blood sugar is     If blood sugar is 175-200 dose 2 units of insulin   If blood sugar is 201-250  dose 3 units of insulin  If blood sugar is over 251, dose 4units of insulin           Fluctuations in blood sugars are best detected by testing your sugar with your meter.   In order for me to determine any patterns in your blood sugars, you will need to test your blood sugar 1- 2 times daily     It would be  best to change up the times of day that you are testing your sugar.   Always test before breakfast (fasting) and then alternate testing blood sugar 1-2 hours after your meals.     American Diabetes Association: blood sugar targets:     Fasting blood sugar (before breakfast) Target:    (ideally less than 110)  2 hours after eating less than 180 (ideally less than  150 )     Call for blood sugars less than  75 or greater than  200 more than 2 times in a week           Watch for low blood sugars: (less than 70 )    Treatment of Low Blood Glucose Action Plan  1. Check blood glucose to be sure that it is low. You cannot  always go by symptoms or how you feel. If in doubt, treat your low blood glucose anyway.  Rule of 15 :     2. Take 15 grams of carbohydrate (carb). Here are some choices:    4 oz. regular fruit juice  3-4 glucose tablets  6 oz. regular soda   7-8 jelly beans    3. Recheck blood glucose after 10-15 minutes. If blood glucose is still low (less than 70 mg/dl) repeat the treatment (step 2).    4. If your next meal is more than one hour away, eat a small snack.    5. If you’re not sure what caused your low blood glucose, call your healthcare provider.    6. Always check your blood glucose before you drive

## 2024-02-08 ENCOUNTER — TELEPHONE (OUTPATIENT)
Dept: ENDOCRINOLOGY CLINIC | Facility: CLINIC | Age: 59
End: 2024-02-08

## 2024-02-08 NOTE — TELEPHONE ENCOUNTER
Received PA request for Mounjaro 2.5 mg, completed on [employer].     (Key: [id number])    Your demographic data has been sent to [facility] successfully!    [facility] typically takes 5-10 minutes to respond, but it may take a little longer in some cases. You will be notified by email when available. You can also check for an update later by opening this request from your dashboard. Please do not fax or call [facility] to resubmit this request. If you need assistance, please chat with [employer] or call us at [phone].    If it has been longer than 24 hours, please reach out to [facility].

## 2024-02-09 NOTE — TELEPHONE ENCOUNTER
Checked covermymeds for update:    Your PA has been resolved, no additional PA is required. For further inquiries please contact the number on the back of the member prescription card. (Message [phone])    Sent pt [facility] to update

## 2024-03-01 ENCOUNTER — LABORATORY ENCOUNTER (OUTPATIENT)
Dept: LAB | Age: 59
End: 2024-03-01
Attending: SURGERY
Payer: COMMERCIAL

## 2024-03-01 ENCOUNTER — EKG ENCOUNTER (OUTPATIENT)
Dept: LAB | Age: 59
End: 2024-03-01
Attending: SURGERY
Payer: COMMERCIAL

## 2024-03-01 DIAGNOSIS — E11.65 TYPE 2 DIABETES MELLITUS WITH HYPERGLYCEMIA, WITH LONG-TERM CURRENT USE OF INSULIN (HCC): Chronic | ICD-10-CM

## 2024-03-01 DIAGNOSIS — Z79.4 TYPE 2 DIABETES MELLITUS WITH HYPERGLYCEMIA, WITH LONG-TERM CURRENT USE OF INSULIN (HCC): Chronic | ICD-10-CM

## 2024-03-01 DIAGNOSIS — N47.1 PHIMOSIS: ICD-10-CM

## 2024-03-01 LAB
ANION GAP SERPL CALC-SCNC: 8 MMOL/L (ref 0–18)
ATRIAL RATE: 83 BPM
BUN BLD-MCNC: 16 MG/DL (ref 9–23)
CALCIUM BLD-MCNC: 9.6 MG/DL (ref 8.5–10.1)
CHLORIDE SERPL-SCNC: 112 MMOL/L (ref 98–112)
CO2 SERPL-SCNC: 20 MMOL/L (ref 21–32)
CREAT BLD-MCNC: 0.78 MG/DL
EGFRCR SERPLBLD CKD-EPI 2021: 105 ML/MIN/1.73M2 (ref 60–?)
FASTING STATUS PATIENT QL REPORTED: YES
GLUCOSE BLD-MCNC: 99 MG/DL (ref 70–99)
OSMOLALITY SERPL CALC.SUM OF ELEC: 291 MOSM/KG (ref 275–295)
P AXIS: 40 DEGREES
P-R INTERVAL: 134 MS
POTASSIUM SERPL-SCNC: 4 MMOL/L (ref 3.5–5.1)
Q-T INTERVAL: 380 MS
QRS DURATION: 76 MS
QTC CALCULATION (BEZET): 446 MS
R AXIS: -15 DEGREES
SODIUM SERPL-SCNC: 140 MMOL/L (ref 136–145)
T AXIS: 32 DEGREES
VENTRICULAR RATE: 83 BPM

## 2024-03-01 PROCEDURE — 93010 ELECTROCARDIOGRAM REPORT: CPT | Performed by: INTERNAL MEDICINE

## 2024-03-01 PROCEDURE — 93005 ELECTROCARDIOGRAM TRACING: CPT

## 2024-03-01 PROCEDURE — 80048 BASIC METABOLIC PNL TOTAL CA: CPT

## 2024-03-01 PROCEDURE — 36415 COLL VENOUS BLD VENIPUNCTURE: CPT

## 2024-03-04 RX ORDER — INSULIN ASPART INJECTION 100 [IU]/ML
INJECTION, SOLUTION SUBCUTANEOUS
Qty: 18 ML | Refills: 0 | Status: SHIPPED | OUTPATIENT
Start: 2024-03-04

## 2024-03-05 ENCOUNTER — ANESTHESIA EVENT (OUTPATIENT)
Dept: SURGERY | Facility: HOSPITAL | Age: 59
End: 2024-03-05
Payer: COMMERCIAL

## 2024-03-06 RX ORDER — DAPAGLIFLOZIN AND METFORMIN HYDROCHLORIDE 5; 1000 MG/1; MG/1
2 TABLET, FILM COATED, EXTENDED RELEASE ORAL DAILY
Qty: 180 TABLET | Refills: 1 | Status: SHIPPED | OUTPATIENT
Start: 2024-03-06

## 2024-03-06 NOTE — TELEPHONE ENCOUNTER
Requested Prescriptions     Pending Prescriptions Disp Refills    Dapagliflozin Pro-metFORMIN ER (XIGDUO XR) 5-1000 MG Oral Tablet 24 Hr 180 tablet 1     Sig: Take 2 tablets by mouth daily.     Future Appointments   Date Time Provider Department Center   4/19/2024 10:00 AM Link Brito MD [id number]  [facility]   6/5/2024  3:00 PM Beverly Preston APRN [id number]     Last A1c value was 9.1% done 2/7/2024.  Refill 5/17/23  LOV 2/7/24

## 2024-03-18 ENCOUNTER — ANESTHESIA (OUTPATIENT)
Dept: SURGERY | Facility: HOSPITAL | Age: 59
End: 2024-03-18
Payer: COMMERCIAL

## 2024-03-18 ENCOUNTER — HOSPITAL ENCOUNTER (OUTPATIENT)
Facility: HOSPITAL | Age: 59
Setting detail: HOSPITAL OUTPATIENT SURGERY
Discharge: HOME | End: 2024-03-18
Attending: SURGERY | Admitting: SURGERY
Payer: COMMERCIAL

## 2024-03-18 VITALS
TEMPERATURE: 98 F | HEIGHT: 72 IN | SYSTOLIC BLOOD PRESSURE: 111 MMHG | WEIGHT: 222 LBS | RESPIRATION RATE: 16 BRPM | BODY MASS INDEX: 30.07 KG/M2 | HEART RATE: 92 BPM | DIASTOLIC BLOOD PRESSURE: 86 MMHG | OXYGEN SATURATION: 94 %

## 2024-03-18 DIAGNOSIS — N48.1 BALANITIS: ICD-10-CM

## 2024-03-18 DIAGNOSIS — N47.1 PHIMOSIS: ICD-10-CM

## 2024-03-18 DIAGNOSIS — E11.65 TYPE 2 DIABETES MELLITUS WITH HYPERGLYCEMIA, WITH LONG-TERM CURRENT USE OF INSULIN (HCC): Primary | Chronic | ICD-10-CM

## 2024-03-18 DIAGNOSIS — Z79.4 TYPE 2 DIABETES MELLITUS WITH HYPERGLYCEMIA, WITH LONG-TERM CURRENT USE OF INSULIN (HCC): Primary | Chronic | ICD-10-CM

## 2024-03-18 PROBLEM — Q55.69 SHORT FRENULUM OF PENIS: Status: ACTIVE | Noted: 2024-03-18

## 2024-03-18 LAB
GLUCOSE BLD-MCNC: 140 MG/DL (ref 70–99)
GLUCOSE BLD-MCNC: 148 MG/DL (ref 70–99)

## 2024-03-18 PROCEDURE — 54161 CIRCUM 28 DAYS OR OLDER: CPT | Performed by: SURGERY

## 2024-03-18 PROCEDURE — 54164 FRENULOTOMY OF PENIS: CPT | Performed by: SURGERY

## 2024-03-18 PROCEDURE — 0VTTXZZ RESECTION OF PREPUCE, EXTERNAL APPROACH: ICD-10-PCS | Performed by: SURGERY

## 2024-03-18 RX ORDER — HYDROMORPHONE HYDROCHLORIDE 1 MG/ML
0.4 INJECTION, SOLUTION INTRAMUSCULAR; INTRAVENOUS; SUBCUTANEOUS EVERY 5 MIN PRN
Status: DISCONTINUED | OUTPATIENT
Start: 2024-03-18 | End: 2024-03-18

## 2024-03-18 RX ORDER — HYDROCODONE BITARTRATE AND ACETAMINOPHEN 10; 325 MG/1; MG/1
2 TABLET ORAL ONCE AS NEEDED
Status: DISCONTINUED | OUTPATIENT
Start: 2024-03-18 | End: 2024-03-18

## 2024-03-18 RX ORDER — LABETALOL HYDROCHLORIDE 5 MG/ML
5 INJECTION, SOLUTION INTRAVENOUS EVERY 5 MIN PRN
Status: DISCONTINUED | OUTPATIENT
Start: 2024-03-18 | End: 2024-03-18

## 2024-03-18 RX ORDER — PROCHLORPERAZINE EDISYLATE 5 MG/ML
5 INJECTION INTRAMUSCULAR; INTRAVENOUS EVERY 8 HOURS PRN
Status: DISCONTINUED | OUTPATIENT
Start: 2024-03-18 | End: 2024-03-18

## 2024-03-18 RX ORDER — MIDAZOLAM HYDROCHLORIDE 1 MG/ML
1 INJECTION INTRAMUSCULAR; INTRAVENOUS EVERY 5 MIN PRN
Status: DISCONTINUED | OUTPATIENT
Start: 2024-03-18 | End: 2024-03-18

## 2024-03-18 RX ORDER — ONDANSETRON 2 MG/ML
INJECTION INTRAMUSCULAR; INTRAVENOUS AS NEEDED
Status: DISCONTINUED | OUTPATIENT
Start: 2024-03-18 | End: 2024-03-18 | Stop reason: SURG

## 2024-03-18 RX ORDER — BUPIVACAINE HYDROCHLORIDE 5 MG/ML
INJECTION, SOLUTION EPIDURAL; INTRACAUDAL AS NEEDED
Status: DISCONTINUED | OUTPATIENT
Start: 2024-03-18 | End: 2024-03-18

## 2024-03-18 RX ORDER — CEFAZOLIN SODIUM/WATER 2 G/20 ML
2 SYRINGE (ML) INTRAVENOUS ONCE
Status: COMPLETED | OUTPATIENT
Start: 2024-03-18 | End: 2024-03-18

## 2024-03-18 RX ORDER — SCOLOPAMINE TRANSDERMAL SYSTEM 1 MG/1
1 PATCH, EXTENDED RELEASE TRANSDERMAL ONCE
Status: DISCONTINUED | OUTPATIENT
Start: 2024-03-18 | End: 2024-03-18 | Stop reason: HOSPADM

## 2024-03-18 RX ORDER — LIDOCAINE HYDROCHLORIDE 10 MG/ML
INJECTION, SOLUTION INFILTRATION; PERINEURAL AS NEEDED
Status: DISCONTINUED | OUTPATIENT
Start: 2024-03-18 | End: 2024-03-18

## 2024-03-18 RX ORDER — ACETAMINOPHEN 500 MG
1000 TABLET ORAL ONCE AS NEEDED
Status: DISCONTINUED | OUTPATIENT
Start: 2024-03-18 | End: 2024-03-18

## 2024-03-18 RX ORDER — BACITRACIN ZINC AND POLYMYXIN B SULFATE 500; 10000 [USP'U]/G; [USP'U]/G
OINTMENT TOPICAL AS NEEDED
Status: DISCONTINUED | OUTPATIENT
Start: 2024-03-18 | End: 2024-03-18

## 2024-03-18 RX ORDER — NALOXONE HYDROCHLORIDE 0.4 MG/ML
80 INJECTION, SOLUTION INTRAMUSCULAR; INTRAVENOUS; SUBCUTANEOUS AS NEEDED
Status: DISCONTINUED | OUTPATIENT
Start: 2024-03-18 | End: 2024-03-18

## 2024-03-18 RX ORDER — POLYETHYLENE GLYCOL 3350 17 G/17G
17 POWDER, FOR SOLUTION ORAL DAILY PRN
Qty: 20 PACKET | Refills: 1 | Status: SHIPPED | OUTPATIENT
Start: 2024-03-18

## 2024-03-18 RX ORDER — HYDROMORPHONE HYDROCHLORIDE 1 MG/ML
0.2 INJECTION, SOLUTION INTRAMUSCULAR; INTRAVENOUS; SUBCUTANEOUS EVERY 5 MIN PRN
Status: DISCONTINUED | OUTPATIENT
Start: 2024-03-18 | End: 2024-03-18

## 2024-03-18 RX ORDER — PHENYLEPHRINE HCL 10 MG/ML
VIAL (ML) INJECTION AS NEEDED
Status: DISCONTINUED | OUTPATIENT
Start: 2024-03-18 | End: 2024-03-18 | Stop reason: SURG

## 2024-03-18 RX ORDER — SODIUM CHLORIDE, SODIUM LACTATE, POTASSIUM CHLORIDE, CALCIUM CHLORIDE 600; 310; 30; 20 MG/100ML; MG/100ML; MG/100ML; MG/100ML
INJECTION, SOLUTION INTRAVENOUS CONTINUOUS
Status: DISCONTINUED | OUTPATIENT
Start: 2024-03-18 | End: 2024-03-18

## 2024-03-18 RX ORDER — HYDROMORPHONE HYDROCHLORIDE 1 MG/ML
0.6 INJECTION, SOLUTION INTRAMUSCULAR; INTRAVENOUS; SUBCUTANEOUS EVERY 5 MIN PRN
Status: DISCONTINUED | OUTPATIENT
Start: 2024-03-18 | End: 2024-03-18

## 2024-03-18 RX ORDER — ONDANSETRON 2 MG/ML
4 INJECTION INTRAMUSCULAR; INTRAVENOUS EVERY 6 HOURS PRN
Status: DISCONTINUED | OUTPATIENT
Start: 2024-03-18 | End: 2024-03-18

## 2024-03-18 RX ORDER — MEPERIDINE HYDROCHLORIDE 25 MG/ML
12.5 INJECTION INTRAMUSCULAR; INTRAVENOUS; SUBCUTANEOUS AS NEEDED
Status: DISCONTINUED | OUTPATIENT
Start: 2024-03-18 | End: 2024-03-18

## 2024-03-18 RX ORDER — HYDROCODONE BITARTRATE AND ACETAMINOPHEN 10; 325 MG/1; MG/1
1 TABLET ORAL ONCE AS NEEDED
Status: DISCONTINUED | OUTPATIENT
Start: 2024-03-18 | End: 2024-03-18

## 2024-03-18 RX ORDER — DEXTROSE MONOHYDRATE 25 G/50ML
50 INJECTION, SOLUTION INTRAVENOUS
Status: DISCONTINUED | OUTPATIENT
Start: 2024-03-18 | End: 2024-03-18 | Stop reason: HOSPADM

## 2024-03-18 RX ORDER — NICOTINE POLACRILEX 4 MG
15 LOZENGE BUCCAL
Status: DISCONTINUED | OUTPATIENT
Start: 2024-03-18 | End: 2024-03-18 | Stop reason: HOSPADM

## 2024-03-18 RX ORDER — DEXAMETHASONE SODIUM PHOSPHATE 4 MG/ML
VIAL (ML) INJECTION AS NEEDED
Status: DISCONTINUED | OUTPATIENT
Start: 2024-03-18 | End: 2024-03-18 | Stop reason: SURG

## 2024-03-18 RX ORDER — ACETAMINOPHEN 500 MG
1000 TABLET ORAL ONCE
Status: DISCONTINUED | OUTPATIENT
Start: 2024-03-18 | End: 2024-03-18 | Stop reason: HOSPADM

## 2024-03-18 RX ORDER — HYDROCODONE BITARTRATE AND ACETAMINOPHEN 5; 325 MG/1; MG/1
1 TABLET ORAL EVERY 6 HOURS PRN
Qty: 10 TABLET | Refills: 0 | Status: SHIPPED | OUTPATIENT
Start: 2024-03-18

## 2024-03-18 RX ORDER — NICOTINE POLACRILEX 4 MG
30 LOZENGE BUCCAL
Status: DISCONTINUED | OUTPATIENT
Start: 2024-03-18 | End: 2024-03-18 | Stop reason: HOSPADM

## 2024-03-18 RX ADMIN — DEXAMETHASONE SODIUM PHOSPHATE 4 MG: 4 MG/ML VIAL (ML) INJECTION at 14:56:00

## 2024-03-18 RX ADMIN — SODIUM CHLORIDE, SODIUM LACTATE, POTASSIUM CHLORIDE, CALCIUM CHLORIDE: 600; 310; 30; 20 INJECTION, SOLUTION INTRAVENOUS at 16:19:00

## 2024-03-18 RX ADMIN — PHENYLEPHRINE HCL 100 MCG: 10 MG/ML VIAL (ML) INJECTION at 15:21:00

## 2024-03-18 RX ADMIN — PHENYLEPHRINE HCL 100 MCG: 10 MG/ML VIAL (ML) INJECTION at 15:01:00

## 2024-03-18 RX ADMIN — ONDANSETRON 4 MG: 2 INJECTION INTRAMUSCULAR; INTRAVENOUS at 14:56:00

## 2024-03-18 RX ADMIN — CEFAZOLIN SODIUM/WATER 2 G: 2 G/20 ML SYRINGE (ML) INTRAVENOUS at 14:54:00

## 2024-03-18 NOTE — ANESTHESIA PREPROCEDURE EVALUATION
PRE-OP EVALUATION    Patient Name: Prashant Patel    Admit Diagnosis: Balanitis [N48.1]    Pre-op Diagnosis: Balanitis [N48.1]    CIRCUMCISION    Anesthesia Procedure: CIRCUMCISION    Surgeon(s) and Role:     * Link Brito MD - Primary    Pre-op vitals reviewed.  Temp: 98 °F (36.7 °C)  Pulse: 93  Resp: 16  BP: 107/73  SpO2: 96 %  Body mass index is 30.11 kg/m².    Current medications reviewed.  Hospital Medications:   acetaminophen (Tylenol Extra Strength) tab 1,000 mg  1,000 mg Oral Once    scopolamine (Transderm-Scop) 1 MG/3DAYS patch 1 patch  1 patch Transdermal Once    glucose (Dex4) 15 GM/59ML oral liquid 15 g  15 g Oral Q15 Min PRN    Or    glucose (Glutose) 40% oral gel 15 g  15 g Oral Q15 Min PRN    Or    glucose-vitamin C (Dex-4) chewable tab 4 tablet  4 tablet Oral Q15 Min PRN    Or    dextrose 50% injection 50 mL  50 mL Intravenous Q15 Min PRN    Or    glucose (Dex4) 15 GM/59ML oral liquid 30 g  30 g Oral Q15 Min PRN    Or    glucose (Glutose) 40% oral gel 30 g  30 g Oral Q15 Min PRN    Or    glucose-vitamin C (Dex-4) chewable tab 8 tablet  8 tablet Oral Q15 Min PRN    lactated ringers infusion   Intravenous Continuous    ceFAZolin (Ancef) 2 g in 20mL IV syringe premix  2 g Intravenous Once       Outpatient Medications:     Medications Prior to Admission   Medication Sig Dispense Refill Last Dose    Insulin Aspart, w/Niacinamide, (FIASP FLEXTOUCH) 100 UNIT/ML Subcutaneous Solution Pen-injector INJECT UP TO 20 UNITS UNDER THE SKIN AS DIRECTED BEFORE MEALS BASED ON BLOOD SUGAR READING 18 mL 0 3/18/2024    rosuvastatin 10 MG Oral Tab Take 1 tablet (10 mg total) by mouth daily. 90 tablet 3 3/14/2024    Tadalafil (CIALIS) 20 MG Oral Tab Take 0.5-1 tablets (10-20 mg total) by mouth daily as needed for Erectile Dysfunction. 30 tablet 5     ergocalciferol 1.25 MG (40930 UT) Oral Cap Take 1 capsule (50,000 Units total) by mouth once a week. 12 capsule 1 3/14/2024    tamsulosin 0.4 MG Oral Cap Take 1  capsule (0.4 mg total) by mouth every evening. 90 capsule 6 3/14/2024    Dapagliflozin Pro-metFORMIN ER (XIGDUO XR) 5-1000 MG Oral Tablet 24 Hr Take 2 tablets by mouth daily. 180 tablet 1 3/14/2024    Tirzepatide (MOUNJARO) 2.5 MG/0.5ML Subcutaneous Solution Pen-injector Inject 2.5 mg into the skin once a week. DX E11.9 6 mL 0 Unknown    BD PEN NEEDLE SHA U/F 32G X 4 MM Does not apply Misc Uses up to 3  x daily 300 each 3     Tirzepatide (MOUNJARO) 2.5 MG/0.5ML Subcutaneous Solution Pen-injector Inject 1 each into the skin As Directed. 2 mL 0 Unknown    Insulin Glargine, 2 Unit Dial, (TOUJEO MAX SOLOSTAR) 300 UNIT/ML Subcutaneous Solution Pen-injector INJECT UP TO 60 UNITS DAILY AS DIRECTED (Patient taking differently: Take 30 Units by mouth daily. INJECT UP TO 60 UNITS DAILY AS DIRECTED) 18 mL 0 3/7/2024    PEG 3350-KCl-Na Bicarb-NaCl 420 g Oral Recon Soln Take as directed by physician 4000 mL 0     clotrimazole-betamethasone 1-0.05 % External Cream Apply 1 Application. topically 2 (two) times daily as needed. 45 g 3        Allergies: Patient has no known allergies.      Anesthesia Evaluation    Patient summary reviewed.    Anesthetic Complications  (-) history of anesthetic complications         GI/Hepatic/Renal                                 Cardiovascular      ECG reviewed.          (+) obesity     (+) hyperlipidemia                                  Endo/Other      (+) diabetes  type 2, using insulin                         Pulmonary                           Neuro/Psych                              Class 1 obesity due to excess calories with serious comorbidity and body mass index (BMI) of 32.0 to 32.9 in adult Diverticulosis of large intestine without perforation or abscess without bleeding  First degree hemorrhoids Mixed hyperlipidemia  Pain of right heel Tremors of nervous system  Type 2 diabetes mellitus with hyperglycemia, with long-term current use of insulin (HCC)             Past Surgical History:    Procedure Laterality Date    COLONOSCOPY  10/22/2018    CYSTOSCOPY,INSERT URETERAL STENT      kidney stones x2    OTHER SURGICAL HISTORY  10 -     Bump neck    OTHER SURGICAL HISTORY      surgery in 2011 and 2015 on anus    VASECTOMY       Social History     Socioeconomic History    Marital status:    Tobacco Use    Smoking status: Never    Smokeless tobacco: Never   Vaping Use    Vaping Use: Never used   Substance and Sexual Activity    Alcohol use: No     Comment: Stopped drinking alcohol 8/7/2018    Drug use: No   Other Topics Concern    Caffeine Concern No    Exercise Yes     Comment: Walk    Seat Belt No    Special Diet No    Stress Concern No    Weight Concern No     History   Drug Use No     Available pre-op labs reviewed.  Lab Results   Component Value Date    WBC 8.3 01/05/2024    RBC 5.85 (H) 01/05/2024    HGB 17.5 01/05/2024    HCT 52.6 01/05/2024    MCV 89.9 01/05/2024    MCH 29.9 01/05/2024    MCHC 33.3 01/05/2024    RDW 13.2 01/05/2024    .0 01/05/2024     Lab Results   Component Value Date     03/01/2024    K 4.0 03/01/2024     03/01/2024    CO2 20.0 (L) 03/01/2024    BUN 16 03/01/2024    CREATSERUM 0.78 03/01/2024    GLU 99 03/01/2024    CA 9.6 03/01/2024            Airway        Mouth opening: >3 FB  TM distance: > 6 cm  Neck ROM: full Cardiovascular    Cardiovascular exam normal.         Dental    Dentition appears grossly intact         Pulmonary    Pulmonary exam normal.                 Other findings              ASA: 3   Plan: general  NPO status verified and           Plan/risks discussed with: patient                Present on Admission:  **None**

## 2024-03-18 NOTE — DISCHARGE INSTRUCTIONS
You had a circumcision in the operating room.    Instructions:    - The office will contact you to make your post-operative clinic appointment in 4 weeks. If you do not hear from the clinic after a few days or you need to change your appointment time or date, please contact us at [phone].    - Your follow-up appointment is listed below. Please contact us at [phone] if you need to change your appointment.    Your appointments       Date & Time Appointment Department ([place])    Apr 19, 2024 10:00 AM CDT Post Op Visit with Link Brito MD [facility] ([facility] 4)        Jun 05, 2024  3:00 PM CDT Diabetes Pump follow up with Beverly Preston APRN [facility] ([facility])              [facility] 4  [address]  [facility] [id number] [phone] [facility]  [address]  [facility] [id number] [phone]           - Please take it easy for the next 48 hours.     - You can remove the dressing on the penis 48 hours after surgery.  If it is too tight before then you can loosen it slightly.    - Apply antibiotic ointment or vaseline to the incision twice daily for 1 week after you remove the dressing.    - No sexual activity for 4 weeks.    - Shower once daily starting in 2 days. Let the soapy water run over the incision and do not scrub. You might have dissolvable stitches on the skin, which will scab over and flake off within 1-2 weeks. You should not soak, bathe, or swim for 14 days.     - You may experience pain after the procedure for a few days.  If pain becomes intolerable please contact our office or go to the nearest Emergency Room/Immediate Care. You make take over-the counter ibuprofen for mild pain (provided you do not  have a medical condition such as stomach ulcers or kidney disease which prohibits you from taking). You may take this in addition to tylenol or narcotic pain medications if prescribed. Try to stop any narcotic pain medications as soon as possible after surgery when your pain improves.     - If you take blood thinners (such as aspirin or plavix) please DO NOT take them when you go home. You may resume these medications in 3 days.    - If you are medically allowed to take ibuprofen then you may take 200-400 mg every 8 hours as needed for pain with plenty of fluids. You may take this in addition to tylenol or other pain medication which may be prescribed.     - If you develop a fever > 101 degrees, chills, difficulty urinating or significant abdominal pain in the next 24 hours, call the office.      Link Brito MD  Staff Urologist  [facility]  Office: [phone]

## 2024-03-18 NOTE — ANESTHESIA PROCEDURE NOTES
Airway  Date/Time: 3/18/2024 2:59 PM  Urgency: elective      General Information and Staff    Patient location during procedure: OR  Anesthesiologist: Lang Silverio MD  Performed: anesthesiologist   Performed by: Lang Silverio MD  Authorized by: Lang Silverio MD      Indications and Patient Condition  Indications for airway management: anesthesia  Sedation level: deep  Preoxygenated: yes  Patient position: sniffing  Mask difficulty assessment: 1 - vent by mask    Final Airway Details  Final airway type: supraglottic airway      Successful airway: classic  Size 4       Number of attempts at approach: 1

## 2024-03-18 NOTE — ANESTHESIA POSTPROCEDURE EVALUATION
[facility]    Prashant Patel Patient Status:  Hospital Outpatient Surgery   Age/Gender 58 year old male MRN [id number]   Location [facility] SURGERY Attending Link Brito MD   Hosp Day # 0 PCP Jeana Anderson MD       Anesthesia Post-op Note    CIRCUMCISION, FRENULOTOMY    Procedure Summary       Date: 03/18/24 Room / Location:  MAIN OR 04 / [facility] MAIN OR    Anesthesia Start: 1454 Anesthesia Stop: 1619    Procedure: CIRCUMCISION, FRENULOTOMY (Penis) Diagnosis:       Balanitis      (Balanitis [N48.1])    Surgeons: Link Brito MD Anesthesiologist: Lang Silverio MD    Anesthesia Type: general ASA Status: 3            Anesthesia Type: general    Vitals Value Taken Time   /85 03/18/24 1619   Temp 97.1 °F (36.2 °C) 03/18/24 1619   Pulse 95 03/18/24 1619   Resp 16 03/18/24 1619   SpO2 96 % 03/18/24 1619       Patient Location: PACU    Anesthesia Type: general    Airway Patency: patent and extubated    Postop Pain Control: adequate    Mental Status: mildly sedated but able to meaningfully participate in the post-anesthesia evaluation    Nausea/Vomiting: none    Cardiopulmonary/Hydration status: stable euvolemic    Complications: no apparent anesthesia related complications    Postop vital signs: stable    Dental Exam: Unchanged from Preop    Patient to be discharged from PACU when criteria met.

## 2024-03-18 NOTE — H&P
UROLOGY PRE-OPERATIVE HISTORY & PHYSICAL      Prashant Patel Patient Status:  Hospital Outpatient Surgery    1965 MRN [id number]   [facility] PERIOPERATIVE SERVICE Attending Link Brito MD   Hosp Day # 0 PCP Jeana Anderson MD     Primary Care Provider: Jeana Anderson MD     Chief Complaint:   Balanitis, phimosis, low testosterone, ED, BPH/LUTS     History of Present Illness:   Prashant Patel is a 58 year old male with history of alcoholic pancreatitis, nephrolithiasis, diabetes (A1c 8.0), thrombocytopenia referred for penile pain.  He notes that over the past few months he has had worsening penile discomfort and skin dryness and itching.  He has tried Lotrisone cream but this really has not made things better.  He has some trouble pulling back the foreskin at times.  When he has erections it is often uncomfortable.  He also endorses mild weak urinary stream.  Denies urinary frequency or urgency.  Denies gross hematuria.      I saw him on 3/31/2023 and at that time he had significant foreskin balanitis and mild phimosis with a tethered frenulum.  I recommended proceeding to the OR for circumcision and frenulotomy but this has not yet been scheduled.  He continues to have issues with mild phimosis and tearing of the frenulum and is interested in proceeding with surgery.  I started tamsulosin 0.4 mg nightly for mild obstructive LUTS.  He notices improvement in his urinary stream on tamsulosin.     He returns today in follow-up.  He also endorses low energy and erectile dysfunction.  He has tried Viagra with minimal success so I switched him to Cialis.  Testosterone from 11/10/2023 was 203. Hematocrit was 50.6.  Repeat labs 2024 show testosterone 400.4, PSA 1.05, normal FSH, LH, estradiol.    History:     Past Medical History:   Diagnosis Date    Abnormal EKG     Alcohol withdrawal syndrome with complication (HCC)     Alcoholic pancreatitis (HCC) 2018    Calculus of kidney      COVID-19 virus infection 04/23/2020    Diabetes (HCC) 08/14/2018    Fistula     Iron deficiency anemia, unspecified 12/21/2018    Normocytic anemia     Sinus tachycardia     Thrombocytopenia (HCC)     Visual impairment     glasses       Past Surgical History:   Procedure Laterality Date    COLONOSCOPY  10/22/2018    CYSTOSCOPY,INSERT URETERAL STENT      kidney stones x2    OTHER SURGICAL HISTORY  10 -     Bump neck    OTHER SURGICAL HISTORY      surgery in 2011 and 2015 on anus    VASECTOMY         Family History   Problem Relation Age of Onset    Other (Cancer) Mother         Stomach     Cancer Mother     Diabetes Mother        Social History     Socioeconomic History    Marital status:    Tobacco Use    Smoking status: Never    Smokeless tobacco: Never   Vaping Use    Vaping Use: Never used   Substance and Sexual Activity    Alcohol use: No     Comment: Stopped drinking alcohol 8/7/2018    Drug use: No   Other Topics Concern    Caffeine Concern No    Exercise Yes     Comment: Walk    Seat Belt No    Special Diet No    Stress Concern No    Weight Concern No       Medications:  No current outpatient medications on file.       Allergies:  No Known Allergies    Review of Systems:   A comprehensive 10-point review of systems was completed.  Pertinent positives and negatives are noted in the the HPI.    Physical Exam:   Vital Signs:  Height 6' (1.829 m), weight 222 lb (100.7 kg).     CONSTITUTIONAL: Well developed, well nourished, in no acute distress  NEUROLOGIC: Alert and oriented  HEAD: Normocephalic, atraumatic  EYES: Sclera non-icteric  ENT: Hearing intact, moist mucous membranes  NECK: No obvious goiter or masses  RESPIRATORY: Normal respiratory effort  SKIN: No evident rashes  ABDOMEN: Soft, non-tender, non-distended    Laboratory Data:  Lab Results   Component Value Date    WBC 8.3 01/05/2024    HGB 17.5 01/05/2024    .0 01/05/2024     Lab Results   Component Value Date      03/01/2024    K 4.0 03/01/2024     03/01/2024    CO2 20.0 (L) 03/01/2024    BUN 16 03/01/2024    GLU 99 03/01/2024    GFRAA 114 07/09/2021    AST 29 11/10/2023    ALT 58 11/10/2023    TP 7.5 11/10/2023    ALB 3.8 11/10/2023    PHOS 2.8 08/19/2018    CA 9.6 03/01/2024    MG 2.2 08/19/2018       Urinalysis Results (last three years):  Recent Labs     03/31/23  0825   SPECGRAVITY 1.010   PHURINE 5.0   NITRITE Negative       Urine Culture Results (last three years):  Lab Results   Component Value Date    URINECUL No Growth at 18-24 hrs. 09/27/2019    URINECUL No Growth at 18-24 hrs. 08/08/2018       PSA:  Lab Results   Component Value Date    PSA 1.05 01/05/2024    PSAS 1.06 11/10/2023    PSAS 0.96 07/09/2021    PSAS 1.02 06/14/2019        Imaging (last three days):  No results found.     Assessment:   Prashant Patel is a 58 year old male with history of alcoholic pancreatitis, nephrolithiasis, diabetes (A1c 8.0), thrombocytopenia referred for penile pain.  He notes that over the past few months he has had worsening penile discomfort and skin dryness and itching.  He has tried Lotrisone cream but this really has not made things better.  He has some trouble pulling back the foreskin at times.  When he has erections it is often uncomfortable.  He also endorses mild weak urinary stream.  Denies urinary frequency or urgency.  Denies gross hematuria.      I saw him on 3/31/2023 and at that time he had significant foreskin balanitis and mild phimosis with a tethered frenulum.  I recommended proceeding to the OR for circumcision and frenulotomy but this has not yet been scheduled.  He continues to have issues with mild phimosis and tearing of the frenulum and is interested in proceeding with surgery.  I started tamsulosin 0.4 mg nightly for mild obstructive LUTS.  He notices improvement in his urinary stream on tamsulosin.     He returns today in follow-up.  He also endorses low energy and erectile dysfunction.   He has tried Viagra with minimal success so I switched him to Cialis.  Testosterone from 11/10/2023 was 203. Hematocrit was 50.6.  Repeat labs 1/5/2024 show testosterone 400.4, PSA 1.05, normal FSH, LH, estradiol.    Plan:   - OR for circumcision, frenulotomy  - Informed consent obtained - risks and benefits explained, and all questions answered    I have personally reviewed all relevant medical records, labs, and imaging.     Link Brito MD  Staff Urologist  [facility]  Office: [phone]

## 2024-03-18 NOTE — OPERATIVE REPORT
Urology Operative Note    Attending Surgeon: Link Brito MD    Assistant Surgeon: None    Patient Name: Prashant Patel    Date of Surgery: 3/18/2024    Preoperative Diagnosis: Phimosis    Postoperative Diagnosis: Same    Procedure Performed: Circumcision    Indication:  Patient is a 58 year old male who presented with penile phimosis. The patient was counseled on options, risks, and benefits and elected to undergo the above procedure. We discussed risks including, but not limited to, bleeding, infection, damage to surrounding structures, change in sensation, need for repeat procedure(s). The patient understood these risks and wished to proceed with surgery.    Findings:  Uncomplicated circumcision. Balanitis of internal foreskin - area removed with circumcision.    Procedure:  The patient was taken to the operating room and a timeout was performed confirming the correct patient and procedure. The patient was prepped and draped in supine position after undergoing general anesthesia. Pre-operative prophylactic antibiotics were given in the form of Cefazolin.    20 mL of 1% plain lidocaine were given in the form of dorsal penile block as well as a ring block.    The foreskin was carefully measured and marked to avoid removing too much.  2 circumferential incisions were made using a #15 scalpel, 1 approximately 0.5 cm behind the glans and the other more proximally at the marked line deemed to give appropriate foreskin length.  This newly created ring of foreskin was fully excised using Bovie electrocautery, and passed off the field for specimen.  Meticulous hemostasis was obtained using Bovie electrocautery.  The new cut skin edges were reapproximated using interrupted 4-0 chromic sutures.    Also, the penile frenulum was protuberant and tethered, so for reapproximation of the skin edges this was sharply excised with scissors.  Hemostasis was obtained with Bovie electrocautery.  It was then reapproximated with  simple interrupted 5-0 Monocryl sutures.      Hemostasis was excellent.  The penis was cleaned and dried.  A dressing of Xeroform, Bev, Coban was applied.    The patient was awoken from anesthesia and transferred to PACU in stable condition. The patient tolerated the procedure well. All instrument/supply counts were correct at the end of the case.    Specimens:   None    Estimated Blood Loss:  5 mL    Tubes/Drains:  None    Complications:   None immediate    Condition from OR:  Stable    Plan:   The patient will remove the dressing at home in 2 days.  He will return to clinic in 1 month for follow-up.      Link Brito MD  Staff Urologist  [facility]  Office: [phone]

## 2024-04-02 NOTE — TELEPHONE ENCOUNTER
LOV: 11/3/2023 with Dr. Anderson   RTC: 6 months  Last Relevant Labs: January/February/March 2024  Last Vitamin D Level: 19.4 ng/mL  Filled: 11/28/2023    #12 with 1 refill    Future Appointments   Date Time Provider Department Center   4/19/2024 10:00 AM Link Brito MD [id number]   6/5/2024  3:00 PM Beverly Preston APRN [id number]

## 2024-04-03 RX ORDER — ERGOCALCIFEROL 1.25 MG/1
50000 CAPSULE ORAL WEEKLY
Qty: 12 CAPSULE | Refills: 1 | Status: SHIPPED | OUTPATIENT
Start: 2024-04-03

## 2024-04-19 ENCOUNTER — OFFICE VISIT (OUTPATIENT)
Dept: SURGERY | Facility: CLINIC | Age: 59
End: 2024-04-19

## 2024-04-19 DIAGNOSIS — R82.90 URINE FINDING: ICD-10-CM

## 2024-04-19 DIAGNOSIS — N48.1 BALANITIS: Primary | ICD-10-CM

## 2024-04-19 DIAGNOSIS — N40.1 BPH WITH OBSTRUCTION/LOWER URINARY TRACT SYMPTOMS: ICD-10-CM

## 2024-04-19 DIAGNOSIS — N13.8 BPH WITH OBSTRUCTION/LOWER URINARY TRACT SYMPTOMS: ICD-10-CM

## 2024-04-19 LAB
APPEARANCE: CLEAR
BILIRUBIN: NEGATIVE
GLUCOSE (URINE DIPSTICK): >=1000 MG/DL
KETONES (URINE DIPSTICK): NEGATIVE MG/DL
LEUKOCYTES: NEGATIVE
MULTISTIX LOT#: ABNORMAL NUMERIC
NITRITE, URINE: NEGATIVE
OCCULT BLOOD: NEGATIVE
PH, URINE: 5.5 (ref 4.5–8)
PROTEIN (URINE DIPSTICK): NEGATIVE MG/DL
SPECIFIC GRAVITY: 1.03 (ref 1–1.03)
URINE-COLOR: YELLOW
UROBILINOGEN,SEMI-QN: 0.2 MG/DL (ref 0–1.9)

## 2024-04-19 PROCEDURE — 99214 OFFICE O/P EST MOD 30 MIN: CPT | Performed by: SURGERY

## 2024-04-19 PROCEDURE — 81003 URINALYSIS AUTO W/O SCOPE: CPT | Performed by: SURGERY

## 2024-04-19 RX ORDER — TAMSULOSIN HYDROCHLORIDE 0.4 MG/1
0.4 CAPSULE ORAL EVERY EVENING
Qty: 90 CAPSULE | Refills: 6 | Status: SHIPPED | OUTPATIENT
Start: 2024-04-19

## 2024-04-19 NOTE — PROGRESS NOTES
Urology Clinic Note    Primary Care Provider:  Jeana Anderson MD     Chief Complaint:   Balanitis, phimosis, low testosterone, ED, BPH/LUTS      History of Present Illness:   Prashant Patel is a 58 year old male with history of alcoholic pancreatitis, nephrolithiasis, diabetes (A1c 8.0), thrombocytopenia referred for balanitis and tethered frenulum refractory to topical therapy.     He also endorses mild weak urinary stream.  Denies urinary frequency or urgency.  Denies gross hematuria.      I started tamsulosin 0.4 mg nightly for mild obstructive LUTS.  He notices improvement in his urinary stream on tamsulosin.     He also endorses low energy and erectile dysfunction.  He has tried Viagra with minimal success so I switched him to Cialis.  Testosterone from 11/10/2023 was 203. Hematocrit was 50.6.  Repeat labs 1/5/2024 show testosterone 400.4, PSA 1.05, normal FSH, LH, estradiol.    I brought him to the OR on 3/18/2024 for circumcision and this was uncomplicated.  He did have balanitis of the internal foreskin that was removed.  Surgical pathology showed benign foreskin with chronic inflammation    He is feeling well today.  He denies any further pain or issues with erections.  He is satisfied with his circumcision results.  I also briefly discussed that his testosterone is back to normal on repeat labs, he does feel that his overall energy is back to normal currently.    PSA:  Lab Results   Component Value Date    PSA 1.05 01/05/2024    PSAS 1.06 11/10/2023    PSAS 0.96 07/09/2021    PSAS 1.02 06/14/2019        History:     Past Medical History:    Abnormal EKG    Alcohol withdrawal syndrome with complication (HCC)    Alcoholic pancreatitis (HCC)    Calculus of kidney    COVID-19 virus infection    Diabetes (HCC)    Fistula    Iron deficiency anemia, unspecified    Normocytic anemia    Sinus tachycardia    Thrombocytopenia (HCC)    Visual impairment    glasses       Past Surgical History:   Procedure  Laterality Date    Colonoscopy  10/22/2018    Cystoscopy,insert ureteral stent      kidney stones x2    Other surgical history  10 -     Bump neck    Other surgical history      surgery in 2011 and 2015 on anus    Vasectomy         Family History   Problem Relation Age of Onset    Other (Cancer) Mother         Stomach     Cancer Mother     Diabetes Mother        Social History     Socioeconomic History    Marital status:    Tobacco Use    Smoking status: Never    Smokeless tobacco: Never   Vaping Use    Vaping status: Never Used   Substance and Sexual Activity    Alcohol use: No     Comment: Stopped drinking alcohol 8/7/2018    Drug use: No   Other Topics Concern    Caffeine Concern No    Exercise Yes     Comment: Walk    Seat Belt No    Special Diet No    Stress Concern No    Weight Concern No       Medications (Active prior to today's visit):  Current Outpatient Medications   Medication Sig Dispense Refill    ergocalciferol 1.25 MG (93736 UT) Oral Cap Take 1 capsule (50,000 Units total) by mouth once a week. 12 capsule 1    HYDROcodone-acetaminophen (NORCO) 5-325 MG Oral Tab Take 1 tablet by mouth every 6 (six) hours as needed for Pain (For pain not controlled by tylenol or ibuprofen.). This contains tylenol - do not exceed 4 g of tylenol daily. 10 tablet 0    Polyethylene Glycol 3350 (MIRALAX) 17 g Oral Powd Pack Take 17 g by mouth daily as needed (Take to avoid constipation, especially if taking narcotic pain medications.). 20 packet 1    Dapagliflozin Pro-metFORMIN ER (XIGDUO XR) 5-1000 MG Oral Tablet 24 Hr Take 2 tablets by mouth daily. 180 tablet 1    Insulin Aspart, w/Niacinamide, (FIASP FLEXTOUCH) 100 UNIT/ML Subcutaneous Solution Pen-injector INJECT UP TO 20 UNITS UNDER THE SKIN AS DIRECTED BEFORE MEALS BASED ON BLOOD SUGAR READING 18 mL 0    Tirzepatide (MOUNJARO) 2.5 MG/0.5ML Subcutaneous Solution Pen-injector Inject 2.5 mg into the skin once a week. DX E11.9 6 mL 0    BD PEN NEEDLE SHA  U/F 32G X 4 MM Does not apply Misc Uses up to 3  x daily 300 each 3    Tirzepatide (MOUNJARO) 2.5 MG/0.5ML Subcutaneous Solution Pen-injector Inject 1 each into the skin As Directed. 2 mL 0    rosuvastatin 10 MG Oral Tab Take 1 tablet (10 mg total) by mouth daily. 90 tablet 3    Insulin Glargine, 2 Unit Dial, (TOUJEO MAX SOLOSTAR) 300 UNIT/ML Subcutaneous Solution Pen-injector INJECT UP TO 60 UNITS DAILY AS DIRECTED (Patient taking differently: Take 30 Units by mouth daily. INJECT UP TO 60 UNITS DAILY AS DIRECTED) 18 mL 0    Tadalafil (CIALIS) 20 MG Oral Tab Take 0.5-1 tablets (10-20 mg total) by mouth daily as needed for Erectile Dysfunction. 30 tablet 5    PEG 3350-KCl-Na Bicarb-NaCl 420 g Oral Recon Soln Take as directed by physician 4000 mL 0    tamsulosin 0.4 MG Oral Cap Take 1 capsule (0.4 mg total) by mouth every evening. 90 capsule 6       Allergies:  No Known Allergies    Review of Systems:   A comprehensive 10-point review of systems was completed.  Pertinent positives and negatives are noted in the the HPI.    Physical Exam:   CONSTITUTIONAL: Well developed, well nourished, in no acute distress  NEUROLOGIC: Alert and oriented  HEAD: Normocephalic, atraumatic  EYES: Sclera non-icteric  ENT: Hearing intact, moist mucous membranes  NECK: No obvious goiter or masses  RESPIRATORY: Normal respiratory effort  SKIN: No evident rashes  ABDOMEN: Soft, non-tender, non-distended  GENITOURINARY: Well-healed circumcision incision and frenulum    Assessment & Plan:   Prashant Patel is a 58 year old male with history of alcoholic pancreatitis, nephrolithiasis, diabetes (A1c 8.0), thrombocytopenia referred for balanitis and tethered frenulum refractory to topical therapy.     He also endorses mild weak urinary stream.  Denies urinary frequency or urgency.  Denies gross hematuria.      I started tamsulosin 0.4 mg nightly for mild obstructive LUTS.  He notices improvement in his urinary stream on tamsulosin.     He  also endorses low energy and erectile dysfunction.  He has tried Viagra with minimal success so I switched him to Cialis.  Testosterone from 11/10/2023 was 203. Hematocrit was 50.6.  Repeat labs 1/5/2024 show testosterone 400.4, PSA 1.05, normal FSH, LH, estradiol.    I brought him to the OR on 3/18/2024 for circumcision and this was uncomplicated.  He did have balanitis of the internal foreskin that was removed.  Surgical pathology showed benign foreskin with chronic inflammation    He is feeling well today.  He denies any further pain or issues with erections.  He is satisfied with his circumcision results.  I also briefly discussed that his testosterone is back to normal on repeat labs, he does feel that his overall energy is back to normal currently.    -Continue tamsulosin 0.4 mg daily  -Return to clinic as needed    In total, 30 minutes were spent on this patient encounter (including chart review, patient history, physical, and counseling, documentation, and communication).    Link Brito MD  Staff Urologist  [facility]  Office: [phone]

## 2024-05-03 RX ORDER — INSULIN GLARGINE 300 U/ML
INJECTION, SOLUTION SUBCUTANEOUS
Qty: 18 ML | Refills: 0 | Status: SHIPPED | OUTPATIENT
Start: 2024-05-03

## 2024-05-03 NOTE — TELEPHONE ENCOUNTER
Requested Prescriptions     Pending Prescriptions Disp Refills    Insulin Glargine, 2 Unit Dial, (TOUJEO MAX SOLOSTAR) 300 UNIT/ML Subcutaneous Solution Pen-injector [Pharmacy Med Name: TOUJEO MAX SOLOSTAR 300U/ML PEN 3ML] 18 mL 0     Sig: ADMINISTER UP TO 60 UNITS UNDER THE SKIN DAILY AS DIRECTED     Future Appointments   Date Time Provider Department Center   6/5/2024  3:00 PM Beverly Preston APRN [id number] [place]     Last A1c value was 9.1% done 2/7/2024.  Refill 2/7/24  LOV 2/7/24

## 2024-05-24 RX ORDER — TIRZEPATIDE 2.5 MG/.5ML
2.5 INJECTION, SOLUTION SUBCUTANEOUS WEEKLY
Qty: 6 ML | Refills: 0 | Status: SHIPPED | OUTPATIENT
Start: 2024-05-24

## 2024-05-24 NOTE — TELEPHONE ENCOUNTER
Requested Prescriptions     Pending Prescriptions Disp Refills    Tirzepatide (MOUNJARO) 2.5 MG/0.5ML Subcutaneous Solution Pen-injector 6 mL 0     Sig: Inject 2.5 mg into the skin once a week. DX E11.9     Your appointments       Date & Time Appointment Department (Meta)    Jun 05, 2024 3:15 PM CDT Diabetes Pump follow up with Beverly Preston APRN [facility]              [facility]  [address]  [place] [address]  [phone]          Last A1c value was 9.1% done 2/7/2024.    Refill 2/7/24  LOV 2/7/24

## 2024-05-29 NOTE — TELEPHONE ENCOUNTER
Started PA for Mounjaro 2.5 mg     SYLVIA RENE (Key: [id number])    Luiz is unable to respond with clinical questions. Please see more information at the bottom of the page for next steps.    Outcome  Additional Information Required  Your PA has been resolved, no additional PA is required. For further inquiries please contact the number on the back of the member prescription card. (Message [phone])

## 2024-05-30 ENCOUNTER — PATIENT MESSAGE (OUTPATIENT)
Dept: ENDOCRINOLOGY CLINIC | Facility: CLINIC | Age: 59
End: 2024-05-30

## 2024-05-30 NOTE — TELEPHONE ENCOUNTER
Received fax from [facility] 5/29/24 for Mounajro 2.5 mg PA did through CMM and messages states:   Note      Started PA for Mounjaro 2.5 mg      SYLVIA RENE (Key: [id number])     Luiz is unable to respond with clinical questions. Please see more information at the bottom of the page for next steps.     Outcome  Additional Information Required  Your PA has been resolved, no additional PA is required. For further inquiries please contact the number on the back of the member prescription card. (Message [phone])          Called pharmacy to review informed message from CMM spoke to pharmacist 1 box every 21 days . Rx will be ready in 1.5 hours sent MCM

## 2024-06-05 ENCOUNTER — OFFICE VISIT (OUTPATIENT)
Dept: ENDOCRINOLOGY CLINIC | Facility: CLINIC | Age: 59
End: 2024-06-05
Payer: COMMERCIAL

## 2024-06-05 VITALS
WEIGHT: 226.81 LBS | BODY MASS INDEX: 31 KG/M2 | RESPIRATION RATE: 17 BRPM | DIASTOLIC BLOOD PRESSURE: 66 MMHG | HEART RATE: 85 BPM | OXYGEN SATURATION: 97 % | SYSTOLIC BLOOD PRESSURE: 118 MMHG

## 2024-06-05 DIAGNOSIS — E78.5 DYSLIPIDEMIA: ICD-10-CM

## 2024-06-05 DIAGNOSIS — E11.65 TYPE 2 DIABETES MELLITUS WITH HYPERGLYCEMIA, WITH LONG-TERM CURRENT USE OF INSULIN (HCC): Primary | ICD-10-CM

## 2024-06-05 DIAGNOSIS — E78.2 MIXED HYPERLIPIDEMIA: ICD-10-CM

## 2024-06-05 DIAGNOSIS — Z79.4 TYPE 2 DIABETES MELLITUS WITH HYPERGLYCEMIA, WITH LONG-TERM CURRENT USE OF INSULIN (HCC): Primary | ICD-10-CM

## 2024-06-05 DIAGNOSIS — E66.9 OBESITY (BMI 30-39.9): ICD-10-CM

## 2024-06-05 LAB — HEMOGLOBIN A1C: 6.9 % (ref 4.3–5.6)

## 2024-06-05 PROCEDURE — 83036 HEMOGLOBIN GLYCOSYLATED A1C: CPT | Performed by: NURSE PRACTITIONER

## 2024-06-05 PROCEDURE — 3074F SYST BP LT 130 MM HG: CPT | Performed by: NURSE PRACTITIONER

## 2024-06-05 PROCEDURE — 99214 OFFICE O/P EST MOD 30 MIN: CPT | Performed by: NURSE PRACTITIONER

## 2024-06-05 PROCEDURE — 3078F DIAST BP <80 MM HG: CPT | Performed by: NURSE PRACTITIONER

## 2024-06-05 RX ORDER — TIRZEPATIDE 2.5 MG/.5ML
2.5 INJECTION, SOLUTION SUBCUTANEOUS WEEKLY
Qty: 2 ML | Refills: 5 | Status: SHIPPED | OUTPATIENT
Start: 2024-06-05

## 2024-06-05 NOTE — PROGRESS NOTES
Prashant Patel is a 59 year old  presents today for type 2 diabetes management.   Primary care physician: Jeana Anderson MD     Last DM appt 2-2024    Today's A1C 6.9% ( last A1C 9.1%)    At last visit Mounjaro was started  Since he had significant GI issues w Ozempic, - held him at 2.5mg Mounjaro dose   Tolerating well. Does notice satiety and has been actively decreasing portions.       Using livongo through insurance program (NOT interested in personal sensor: cost)   Lowest BG 98 mg/dl   Highest 132 mg/dl   Denies hypoglycemia sxs     14 day average: 122 mg/d    3-2024 had circumcision w urology - healed well and no further issues w balanitis       Followed by  [facility] ;Dr Hanson  - annual appt   Needs PCP f/u for annual physical         Diabetes History:  Type 2 DM ~   Patient has not had hospitalizations for blood sugar issues  POSITIVE history of pancreatitis ( ETOH induced) 2018 ; confirmed by Lipase/imaging      Previous DM therapies:  levemir ; change in therapy   [employer] insurance formulary 2022   Januvia + panreatitis 2018   Ozempic: severe nausea , abd pain sxs    Glipizide XL 5mg 3-2023     Current DM Regimen:  Xigduo XR 5-1000mg : 2 tab daily   Mounjaro 2.5mg subcutaneous once weekly     Toujeo max solostar: 30 units once daily--> dosing ~ 1 x week       Fiasp (aspart) scale before meals 3 x daily       If blood sugar is 175-200 dose 2 units of insulin   If blood sugar is 201-250  dose 3 units of insulin  If blood sugar is over 251, dose 4units of insulin           HGBA1C:    Lab Results   Component Value Date    A1C 9.1 (A) 02/07/2024    A1C 7.2 (A) 09/06/2023    A1C 8.6 (H) 04/28/2023     (H) 04/28/2023         Lab Results   Component Value Date    CHOLEST 117 11/10/2023    CHOLEST 249 (H) 07/28/2023    TRIG 195 (H) 11/10/2023    TRIG 144 07/28/2023    HDL 39 (L) 11/10/2023    HDL 46 07/28/2023    LDL 46 11/10/2023     (H) 07/28/2023     Lab Results   Component Value  Date    MICROALBCREA 8.1 02/07/2024    MICROALBCREA 13.4 02/15/2023      Lab Results   Component Value Date    CREATSERUM 0.78 03/01/2024    CREATSERUM 0.87 11/10/2023    EGFRCR 105 03/01/2024    EGFRCR 101 11/10/2023     Lab Results   Component Value Date    AST 29 11/10/2023    AST 25 07/28/2023    ALT 58 11/10/2023    ALT 59 07/28/2023       Lab Results   Component Value Date    TSH 0.505 07/09/2021    TSH 0.412 02/07/2020    T4F 1.0 08/08/2018           DM Complications:  Microvascular:   Neuropathy: yes  Retinopathy: yes  Nephropathy: no    Macrovascular:  PVD: no  CAD: no  Stroke/CVA: no        Modifying factors:  Medication adherence: yes   Recent steroids, illness or infections ( past 3m): no     Allergies: Patient has no known allergies.    Past Medical History:    Abnormal EKG    Alcohol withdrawal syndrome with complication (HCC)    Alcoholic pancreatitis (HCC)    Calculus of kidney    COVID-19 virus infection    Diabetes (HCC)    Fistula    Iron deficiency anemia, unspecified    Normocytic anemia    Sinus tachycardia    Thrombocytopenia (HCC)    Visual impairment    glasses     Past Surgical History:   Procedure Laterality Date    Colonoscopy  10/22/2018    Cystoscopy,insert ureteral stent      kidney stones x2    Other surgical history  10 -     Bump neck    Other surgical history      surgery in 2011 and 2015 on anus    Vasectomy       Social History     Socioeconomic History    Marital status:    Tobacco Use    Smoking status: Never    Smokeless tobacco: Never   Vaping Use    Vaping status: Never Used   Substance and Sexual Activity    Alcohol use: No     Comment: Stopped drinking alcohol 8/7/2018    Drug use: No   Other Topics Concern    Caffeine Concern No    Exercise Yes     Comment: Walk    Seat Belt No    Special Diet No    Stress Concern No    Weight Concern No     Family History   Problem Relation Age of Onset    Other (Cancer) Mother         Stomach     Cancer Mother     Diabetes  Mother      Current Medication List:   Current Outpatient Medications   Medication Sig Dispense Refill    Tirzepatide (MOUNJARO) 2.5 MG/0.5ML Subcutaneous Solution Pen-injector Inject 2.5 mg into the skin once a week. DX E11.9 2 mL 5    Insulin Glargine, 2 Unit Dial, (TOUJEO MAX SOLOSTAR) 300 UNIT/ML Subcutaneous Solution Pen-injector ADMINISTER UP TO 60 UNITS UNDER THE SKIN DAILY AS DIRECTED 18 mL 0    Dapagliflozin Pro-metFORMIN ER (XIGDUO XR) 5-1000 MG Oral Tablet 24 Hr Take 2 tablets by mouth daily. 180 tablet 1    Insulin Aspart, w/Niacinamide, (FIASP FLEXTOUCH) 100 UNIT/ML Subcutaneous Solution Pen-injector INJECT UP TO 20 UNITS UNDER THE SKIN AS DIRECTED BEFORE MEALS BASED ON BLOOD SUGAR READING 18 mL 0    rosuvastatin 10 MG Oral Tab Take 1 tablet (10 mg total) by mouth daily. 90 tablet 3    tamsulosin 0.4 MG Oral Cap Take 1 capsule (0.4 mg total) by mouth every evening. 90 capsule 6    ergocalciferol 1.25 MG (49241 UT) Oral Cap Take 1 capsule (50,000 Units total) by mouth once a week. 12 capsule 1    HYDROcodone-acetaminophen (NORCO) 5-325 MG Oral Tab Take 1 tablet by mouth every 6 (six) hours as needed for Pain (For pain not controlled by tylenol or ibuprofen.). This contains tylenol - do not exceed 4 g of tylenol daily. 10 tablet 0    Polyethylene Glycol 3350 (MIRALAX) 17 g Oral Powd Pack Take 17 g by mouth daily as needed (Take to avoid constipation, especially if taking narcotic pain medications.). 20 packet 1    BD PEN NEEDLE SHA U/F 32G X 4 MM Does not apply Misc Uses up to 3  x daily 300 each 3    Tadalafil (CIALIS) 20 MG Oral Tab Take 0.5-1 tablets (10-20 mg total) by mouth daily as needed for Erectile Dysfunction. 30 tablet 5    PEG 3350-KCl-Na Bicarb-NaCl 420 g Oral Recon Soln Take as directed by physician 4000 mL 0           DM associated review of  symptoms:   Endocrine: Polyuria, polyphagia, polydipsia: no  Neurological: Paresthesias: no  HEENT: Blurred vision: no  Skin: + groin rash . NO   wounds  Hematological: Hypoglycemia: no      Review of Systems     LUNGS: denies shortness of breath   CARDIOVASCULAR: denies chest pain  GI: denies abdominal pain, nausea or diarrhea   : denies dysuria  MUSCULOSKELETAL: denies pain        Physical exam:  /66   Pulse 85   Resp 17   Wt 226 lb 12.8 oz (102.9 kg)   SpO2 97%   BMI 30.76 kg/m²   Body mass index is 30.76 kg/m².    Physical Exam   Vitals reviewed.  Constitutional: Normal appearance   Cardiovascular: Normal rate   Pulmonary/Chest: Effort normal  Neurological: Alert and oriented .   Psychiatric: Normal mood and affect.       Assessment/Plan:    Class 1 obesity due to excess calories with serious comorbidity   6# decrease since starting Mounjaro rx.   Continue to focus on improving diet, lifestyle modifications   Doing well w l Mounjaro ; see DM       Dyslipidemia:   Last  labs    -update due in 4 m   Rosuvastatin 10mg once allen       Type 2 diabetes mellitus with hyperglycemia, with long-term current use of insulin ([facility])  A1C 6.9 % (last A1C 9.1%)   Weight: 222  lb today (last weight 226 lb )   Diabetes control is improving but needs ongoing management and evaluation  given the chronicity of Diabetes, ongoing medication monitoring and risk for complications     Diabetes control is improving but needs ongoing management and evaluation  given the chronicity of Diabetes, ongoing medication monitoring and risk for complications          Continue   Xigduo XR 5-1000mg  2 tab daily   Mounjaro 2.5mg once weekly    Toujeo max solostar: 30  units once daily      Fiasp (aspart) scale before meals 3 x daily     If blood sugar is 175-200 dose 2 units of insulin   If blood sugar is 201-250  dose 3 units of insulin  If blood sugar is over 251, dose 4units of insulin     Reminded to call if any rash, itching in genital area or painful urination develops and reviewed  importance of hydration. Reviewed Sick day management and avoidance of keto diets          Continue site rotation for subcutaneous injections     Not interested in CGM (cost) - employer provides [employer] meter/supplies free of charge.   Reviewed clinical significance of A1c, adverse effects of suboptimal glucose control, and goals of therapy   Reviewed the A1C test, what the value reflects and the goal for the patient.   Reminded pt on A1C and blood sugar targets (Fasting < 130 and post prandial <180 ) and complications associated with hyperglycemia and uncontrolled DM (on AVS)   Recommended SMBG 3- x daily if not using CGM   Reviewed s/s and treatment of hypoglycemia (on AVS)   Continue with lifestyle modifications since they have positive impact on diabetes/blood sugars/health (portion control, physical activity, weight loss)   Reinforced timing and adherence with medication, self-monitoring of blood glucose and routine follow up    The patient is asked to return in- 6 m . Recommended to contact DM clinic sooner if questions or concerns.    The patient indicates understanding of these issues and agrees to the plan.      Orders Placed This Encounter    HEMOGLOBIN A1C     Order Specific Question:   Release to patient     Answer:   Immediate    Tirzepatide (MOUNJARO) 2.5 MG/0.5ML Subcutaneous Solution Pen-injector     Sig: Inject 2.5 mg into the skin once a week. DX E11.9     Dispense:  2 mL     Refill:  5     DM quality  A1C/Blood pressure: as reported above   Nephropathy screenin  no indication for  ace /arb rx.   LIPID screenin Rosuvastatin rx.   Last dilated eye exam: No data recorded   Exam shows retinopathy? No data recorded  Last diabetic foot exam: Last Foot Exam: 24        [facility]: has upcoming appt ()     The risks and benefits of my recommendations, as well as other treatment options were discussed with the patient today. questions were also answered to the best of my knowledge.

## 2024-06-05 NOTE — PATIENT INSTRUCTIONS
We are here to help you manage your diabetes. Please continue with your primary care physician/provider for your routine health care maintenance   Your A1C: 6.9% ( last A1C 9.1%)     The A1C:  The A1C test provides us with your average blood sugar for the past 3 months. Keeping an A1C less than 7% helps reduce or delay health problems that are related to diabetes.   The main goal of diabetes treatment is to keep your sugar from going too high to prevent or delay complications from the disease as well as maintain your quality of life.   The target for A1C is less than 7.0%  but sometimes we make exceptions based on age, health history and other factors.      MEDICATIONS:    Continue   Xigduo XR 5-1000mg  2 tab daily   Mounjaro 2.5mg once weekly    Toujeo max solostar: 30  units once daily      Fiasp (aspart) scale before meals 3 x daily     If blood sugar is 175-200 dose 2 units of insulin   If blood sugar is 201-250  dose 3 units of insulin  If blood sugar is over 251, dose 4units of insulin     Blood sugar targets:  Before breakfast:   (preferably less than 110)  2 hours After meals: less than 180 (preferably less than 150)   Please call me if you are having blood sugars less than 75 or more than 200 more than 2 days in a week time span.     Hypoglycemia:    Watch for low blood sugars: (less than 70)  Symptoms of low blood sugar:   Shakiness or dizziness  Cold, clammy skin or sweating  Feeling hungry  Headache  Nervousness  A hard, fast heartbeat  Weakness  Confusion or irritability  Blurred eyesight  Having nightmares or waking up confused or sweating  Numbness or tingling in the lips or tongue     Treatment of Low Blood sugar Action Plan  1. Check blood glucose to be sure that it is low. You can’t always go by symptoms. If in doubt, treat your low blood glucose anyway.  2. Take 15 grams of carbohydrate (carb). Here are some choices:  4 oz. regular fruit juice  3-4 glucose tablets  6 oz. regular soda   7-8  jelly beans  3. Recheck blood glucose after 10-15 minutes. If blood glucose is still low (less than 70 mg/dl) repeat the treatment (step 2).  4. If your next meal is more than one hour away, eat a small snack.  5. If you’re not sure what caused your low blood glucose, call us .  6. Always check your blood glucose before you drive         Diabetes health monitoring      1. LABS: It is important to monitor your kidney function (blood and urine protein levels) , liver function tests and cholesterol levels when you have diabetes. If your primary care provider has not ordered these tests, I will order them for you.   2. FOOT exams:  daily foot inspections for foot wounds or skin changes are important for foot care. Any unusual changes should be reported immediately.  3. EYE exams: Checking your eyes for diabetes changes is important and you should have a dilated eye exam done by an eye doctor EVERY year since these changes occur in the BACK of the eye and not visible by you.     Office protocols:   My Chart Messages - Our goal is to respond to all My Chart messages within 2 business days of receipt. Messages received after 4 pm on Friday, will be addressed on Monday, except if holiday.   Please DO NOT send urgent messages through My Chart as these messages are not monitored after hours.       Patient Calls -We make every attempt to answer all patient calls within 1 business day. Calls that come in after 4pm daily will be addressed the following business day. Nurses are available to answer your calls Monday - Friday from 8am - 4 pm except for holidays.      Refill policies:     Refills may be delayed if you have not had a recent office visit or recent labs as requested by your prescriber.       Our goal is to process your refill within 3  business day of receiving the refill request.   Contact your pharmacy at least 5 days prior to running out of medication and have them send an electronic request or submit a refill  through My chart: select  “request refill” option in your [employer] account.  Refills are not addressed after hours or on weekends; covering providers  do not authorize routine medications on weekends.  Refills  received after 4pm on Fridays will be addressed on Mondays.    If  your prescription is due for a refill, and you are due for a follow up appointment., this will  may impact the ability for you to get a 90 supply if requested.   Yearly blood tests are  required for many medications to insure safe prescribing. If you are due for labs, you will have 30 days to complete the  requested labs before future refills are authorized.   In the event that your preferred pharmacy does not have the requested medication in stock (e.g. Backordered), it is your responsibility to find another pharmacy that has the requested medication available.  We will gladly send a new prescription to that pharmacy at your request.  To best provide you care, patients receiving routine medications need to be seen at least twice per year. However if the A1C is above 8% you will be need to be seen more frequently as recommended by provider and this will also impact your ability get obtain refills          Thank you   Beverly Preston   [phone]

## 2024-07-31 RX ORDER — INSULIN GLARGINE 300 U/ML
INJECTION, SOLUTION SUBCUTANEOUS
Qty: 18 ML | Refills: 0 | Status: SHIPPED | OUTPATIENT
Start: 2024-07-31

## 2024-08-17 ENCOUNTER — PATIENT MESSAGE (OUTPATIENT)
Dept: INTERNAL MEDICINE CLINIC | Facility: CLINIC | Age: 59
End: 2024-08-17

## 2024-08-17 ENCOUNTER — PATIENT MESSAGE (OUTPATIENT)
Dept: ENDOCRINOLOGY CLINIC | Facility: CLINIC | Age: 59
End: 2024-08-17

## 2024-08-19 ENCOUNTER — TELEPHONE (OUTPATIENT)
Dept: ENDOCRINOLOGY CLINIC | Facility: CLINIC | Age: 59
End: 2024-08-19

## 2024-08-19 NOTE — TELEPHONE ENCOUNTER
From: Prashant Patel  To: Beverly Preston  Sent: 8/17/2024 5:08 PM CDT  Subject: Diabetes eye exam     Robert Paul here are the results of my wife Ramandeep Vargas and me Prashatn Patel. Thanks. Best Regards. Prashant.

## 2024-08-19 NOTE — TELEPHONE ENCOUNTER
From: Prashant Patel  To: Jeana Anderson  Sent: 8/17/2024 5:05 PM CDT  Subject: Diabetes eye exam     Hi Doctor, here are the results of my Wife Ramandeep Vargas and me Prashant Patel. Thanks. Best Regards. Prashant.

## 2024-08-27 RX ORDER — DAPAGLIFLOZIN AND METFORMIN HYDROCHLORIDE 5; 1000 MG/1; MG/1
2 TABLET, FILM COATED, EXTENDED RELEASE ORAL DAILY
Qty: 180 TABLET | Refills: 1 | Status: SHIPPED | OUTPATIENT
Start: 2024-08-27

## 2024-08-27 NOTE — TELEPHONE ENCOUNTER
Requested Prescriptions     Pending Prescriptions Disp Refills    XIGDUO XR 5-1000 MG Oral Tablet 24 Hr [Pharmacy Med Name: XIGDUO XR 5MG/1000MG TABLETS] 180 tablet 1     Sig: TAKE 2 TABLETS BY MOUTH DAILY     Future Appointments   Date Time Provider Department Center   12/4/2024  3:15 PM Beverly Preston APRN [facility]     Last A1c value was 6.9% done 6/5/2024.  Refill 3/6/24 C.Boss   LOV 6/5/24 Tanya

## 2024-11-08 ENCOUNTER — OFFICE VISIT (OUTPATIENT)
Dept: INTERNAL MEDICINE CLINIC | Facility: CLINIC | Age: 59
End: 2024-11-08
Payer: COMMERCIAL

## 2024-11-08 VITALS
RESPIRATION RATE: 16 BRPM | HEIGHT: 72 IN | DIASTOLIC BLOOD PRESSURE: 70 MMHG | SYSTOLIC BLOOD PRESSURE: 120 MMHG | BODY MASS INDEX: 29.91 KG/M2 | TEMPERATURE: 98 F | WEIGHT: 220.81 LBS | HEART RATE: 97 BPM

## 2024-11-08 DIAGNOSIS — Z00.00 ENCOUNTER FOR PREVENTATIVE ADULT HEALTH CARE EXAMINATION: Primary | ICD-10-CM

## 2024-11-08 DIAGNOSIS — N52.9 ERECTILE DYSFUNCTION, UNSPECIFIED ERECTILE DYSFUNCTION TYPE: ICD-10-CM

## 2024-11-08 DIAGNOSIS — Z23 NEED FOR IMMUNIZATION AGAINST INFLUENZA: ICD-10-CM

## 2024-11-08 DIAGNOSIS — E55.9 VITAMIN D DEFICIENCY: ICD-10-CM

## 2024-11-08 DIAGNOSIS — Z23 NEED FOR DIPHTHERIA-TETANUS-PERTUSSIS (TDAP) VACCINE: ICD-10-CM

## 2024-11-08 RX ORDER — TADALAFIL 20 MG/1
TABLET ORAL
Qty: 30 TABLET | Refills: 3 | Status: SHIPPED | OUTPATIENT
Start: 2024-11-08

## 2024-11-08 NOTE — PATIENT INSTRUCTIONS
- Get vitamin D blood test done with Beverly Preston's labs.  - Cialis prescription sent to [facility]; if that does not go through, I would recommend downloading the [employer] smartphone jalen to see where it is cheapest out of pocket/without insurance  - Flu shot, tetanus shot given today  - Stop Mounjaro and let Beverly know if you develop abdominal pain/pancreas symptoms  - Recommend updated COVID booster shot after the new year  - Follow up in 1 year for physical/chronic issues; follow up earlier as needed.    It was a pleasure seeing you in the clinic today.  Thank you for choosing the [facility] [place] office for your healthcare needs. Please call at [phone] with any questions or concerns.    Jeana Anderson MD

## 2024-11-08 NOTE — PROGRESS NOTES
Prashant Patel is a 59 year old male.   HPI:     Chief Complaint   Patient presents with    Physical     Patient presents for CPX/wellness examination.  Diet: Trying to adhere to diabetic diet.   Exercise: Walking regularly  Vision: Up to date with eye exam  Dental: Had a cleaning 2 months ago - gets cleanings every 4 months.    Acute issues:  None reported at this time.    Chronic issues:  DM II - following with [place] [facility]; A1c of 6.9% in June.  Vitamin D deficiency - on prescription vitamin D.  Erectile dysfunction - on tadalafil from Urology; less side effects than sildenafil.    Past medical, family, surgical and social history were reviewed as listed in the chart, had circumcision since last visit.  REVIEW OF SYSTEMS:   GENERAL/ const: no fevers/chills, no unintentional weight loss  SKIN: denies any unusual skin lesions  EYES:no vision problems  HEENT: denies sinus pain or sinus tenderness  LUNGS: denies shortness of breath   CARDIOVASCULAR: denies chest pain  GI: denies nausea/emesis/ abdominal pain diarrhea constipation  : erectile dysfunction; denies dysuria   MUSCULOSKELETAL: no acute arthralgias  NEURO: denies headaches  PSYCHIATRIC: denies issues  ENDOCRINE: no hot/cold intolerance  ALLERGY: Allergies[1]  PAST HISTORY:     Current Outpatient Medications:     Tadalafil (CIALIS) 20 MG Oral Tab, Take 0.5-1 tablets (10-20 mg total) by mouth daily as needed for Erectile Dysfunction., Disp: 30 tablet, Rfl: 3    XIGDUO XR 5-1000 MG Oral Tablet 24 Hr, TAKE 2 TABLETS BY MOUTH DAILY, Disp: 180 tablet, Rfl: 1    TOUJEO MAX SOLOSTAR 300 UNIT/ML Subcutaneous Solution Pen-injector, ADMINISTER UP TO 60 UNITS UNDER THE SKIN DAILY AS DIRECTED, Disp: 18 mL, Rfl: 0    Tirzepatide (MOUNJARO) 2.5 MG/0.5ML Subcutaneous Solution Pen-injector, Inject 2.5 mg into the skin once a week. DX E11.9, Disp: 2 mL, Rfl: 5    tamsulosin 0.4 MG Oral Cap, Take 1 capsule (0.4 mg total) by mouth every evening., Disp: 90  capsule, Rfl: 6    ergocalciferol 1.25 MG (25196 UT) Oral Cap, Take 1 capsule (50,000 Units total) by mouth once a week., Disp: 12 capsule, Rfl: 1    Polyethylene Glycol 3350 (MIRALAX) 17 g Oral Powd Pack, Take 17 g by mouth daily as needed (Take to avoid constipation, especially if taking narcotic pain medications.)., Disp: 20 packet, Rfl: 1    Insulin Aspart, w/Niacinamide, (FIASP FLEXTOUCH) 100 UNIT/ML Subcutaneous Solution Pen-injector, INJECT UP TO 20 UNITS UNDER THE SKIN AS DIRECTED BEFORE MEALS BASED ON BLOOD SUGAR READING, Disp: 18 mL, Rfl: 0    BD PEN NEEDLE SHA U/F 32G X 4 MM Does not apply Misc, Uses up to 3  x daily, Disp: 300 each, Rfl: 3    rosuvastatin 10 MG Oral Tab, Take 1 tablet (10 mg total) by mouth daily., Disp: 90 tablet, Rfl: 3  Medical:  has a past medical history of Abnormal EKG, Alcohol withdrawal syndrome with complication (HCC), Alcoholic pancreatitis (HCC) (08/2018), Calculus of kidney, COVID-19 virus infection (04/23/2020), Diabetes (HCC) (08/14/2018), Fistula, Iron deficiency anemia, unspecified (12/21/2018), Normocytic anemia, Sinus tachycardia, Thrombocytopenia (HCC), and Visual impairment.  Surgical:  has a past surgical history that includes vasectomy; cystoscopy,insert ureteral stent; colonoscopy (10/22/2018); other surgical history (10 - ); and other surgical history.  Family: family history includes Cancer in his mother and mother; Diabetes in his mother.  Social:  reports that he has never smoked. He has never used smokeless tobacco. He reports that he does not drink alcohol and does not use drugs.  Wt Readings from Last 6 Encounters:   11/08/24 220 lb 12.8 oz (100.2 kg)   06/05/24 226 lb 12.8 oz (102.9 kg)   02/22/24 222 lb (100.7 kg)   02/07/24 226 lb 6.4 oz (102.7 kg)   01/02/24 220 lb (99.8 kg)   11/03/23 230 lb 9.6 oz (104.6 kg)     EXAM:   /70   Pulse 97   Temp 97.8 °F (36.6 °C) (Temporal)   Resp 16   Ht 6' (1.829 m)   Wt 220 lb 12.8 oz (100.2 kg)    BMI 29.95 kg/m²   GENERAL: Alert and oriented, well developed, well nourished,in no apparent distress  SKIN: no rashes,no suspicious lesions  HEENT: atraumatic, PERRLA, EOMI, normal lid and conjunctiva, normal external canals and tympanic membranes bilaterally  NECK: supple, no jvd, no thyromegaly, no palpable/tender cervical lymphadenopathy  LUNGS: clear to auscultation bilaterally, no wheezing/rubs  CARDIO: RRR without murmurs.  No clubbing, cyanosis or edema.  GI: soft non tender nondistended no hepatosplenomegaly, bowel sounds throughout  NEURO: CN II-XII intact, 5/5 strength all extremities  MS: Full ROM  PSYCH: pleasant, appropriate mood and affect  ASSESSMENT AND PLAN:   1.  Encounter for preventative adult health examination  2. Need for immunization against influenza  3. Need for diphtheria-tetanus-pertussis (Tdap) vaccine  Age appropriate health guidance and counseling provided.  Screening labs ordered as below.  Body mass index is 29.95 kg/m².  Colonoscopy done in January, 10 year recall.  Flu shot, TDaP administered in office today.  Following with Diabetes clinic for diabetes.  A1c improved at 6.9%.  On Mounjaro - no GI side effects like he had with Ozempic.  With his history of pancreatic issues I advised patient to stop Mounjaro immediately and let us/diabetes clinic know if he develops abdominal pain/symptoms.   - Fluzone trivalent vaccine, PF 0.5mL, 6mo+ (41141)  - TdaP (Adacel, Boostrix) [52214]    4. Vitamin D deficiency  On prescription supplementation.  Will check updated vitamin D levels with next labs.  - Vitamin D; Future    5. Erectile dysfunction, unspecified erectile dysfunction type  On PRN tadalafil from Urology.  Less side effects with tadalafil compared to sildenafil.  [facility] [employer] - he would like to try [facility].  Prescription sent there.  Advised him to check [employer] jalen if that does work.   - Tadalafil (CIALIS) 20 MG Oral Tab; Take 0.5-1  tablets (10-20 mg total) by mouth daily as needed for Erectile Dysfunction.  Dispense: 30 tablet; Refill: 3    Patient Care Team:  Jeana Anderson MD as PCP - General (Internal Medicine)  Florina Lopez MD (SURGERY, GENERAL)  Link Brito MD as Consulting Physician (UROLOGY)  Sin Hanson MD as Consulting Physician (CARDIOLOGY)  Beverly Preston APRN (Nurse Practitioner)  The patient indicates understanding of these issues and agrees to the plan.  The patient is asked to return to clinic in 12 months for follow up on chronic issues/CPX, or earlier if acute issues arise.    Jeana Anderson MD             [1] No Known Allergies

## 2024-11-15 ENCOUNTER — LAB ENCOUNTER (OUTPATIENT)
Dept: LAB | Age: 59
End: 2024-11-15
Attending: INTERNAL MEDICINE
Payer: COMMERCIAL

## 2024-11-15 DIAGNOSIS — E11.65 TYPE 2 DIABETES MELLITUS WITH HYPERGLYCEMIA, WITH LONG-TERM CURRENT USE OF INSULIN (HCC): ICD-10-CM

## 2024-11-15 DIAGNOSIS — E55.9 VITAMIN D DEFICIENCY: ICD-10-CM

## 2024-11-15 DIAGNOSIS — Z79.4 TYPE 2 DIABETES MELLITUS WITH HYPERGLYCEMIA, WITH LONG-TERM CURRENT USE OF INSULIN (HCC): ICD-10-CM

## 2024-11-15 LAB
ALBUMIN SERPL-MCNC: 4.2 G/DL (ref 3.2–4.8)
ALBUMIN/GLOB SERPL: 1.3 {RATIO} (ref 1–2)
ALP LIVER SERPL-CCNC: 71 U/L
ALT SERPL-CCNC: 42 U/L
ANION GAP SERPL CALC-SCNC: 6 MMOL/L (ref 0–18)
AST SERPL-CCNC: 34 U/L (ref ?–34)
BILIRUB SERPL-MCNC: 0.5 MG/DL (ref 0.3–1.2)
BUN BLD-MCNC: 16 MG/DL (ref 9–23)
CALCIUM BLD-MCNC: 9.4 MG/DL (ref 8.7–10.4)
CHLORIDE SERPL-SCNC: 111 MMOL/L (ref 98–112)
CHOLEST SERPL-MCNC: 144 MG/DL (ref ?–200)
CO2 SERPL-SCNC: 24 MMOL/L (ref 21–32)
CREAT BLD-MCNC: 0.88 MG/DL
EGFRCR SERPLBLD CKD-EPI 2021: 100 ML/MIN/1.73M2 (ref 60–?)
FASTING PATIENT LIPID ANSWER: YES
FASTING STATUS PATIENT QL REPORTED: YES
GLOBULIN PLAS-MCNC: 3.3 G/DL (ref 2–3.5)
GLUCOSE BLD-MCNC: 208 MG/DL (ref 70–99)
HDLC SERPL-MCNC: 44 MG/DL (ref 40–59)
LDLC SERPL CALC-MCNC: 67 MG/DL (ref ?–100)
NONHDLC SERPL-MCNC: 100 MG/DL (ref ?–130)
OSMOLALITY SERPL CALC.SUM OF ELEC: 299 MOSM/KG (ref 275–295)
POTASSIUM SERPL-SCNC: 4.2 MMOL/L (ref 3.5–5.1)
PROT SERPL-MCNC: 7.5 G/DL (ref 5.7–8.2)
SODIUM SERPL-SCNC: 141 MMOL/L (ref 136–145)
TRIGL SERPL-MCNC: 201 MG/DL (ref 30–149)
VIT D+METAB SERPL-MCNC: 24.7 NG/ML (ref 30–100)
VLDLC SERPL CALC-MCNC: 30 MG/DL (ref 0–30)

## 2024-11-15 PROCEDURE — 82306 VITAMIN D 25 HYDROXY: CPT | Performed by: INTERNAL MEDICINE

## 2024-11-15 PROCEDURE — 80061 LIPID PANEL: CPT | Performed by: NURSE PRACTITIONER

## 2024-11-15 PROCEDURE — 80053 COMPREHEN METABOLIC PANEL: CPT | Performed by: NURSE PRACTITIONER

## 2024-11-19 RX ORDER — ERGOCALCIFEROL 1.25 MG/1
50000 CAPSULE, LIQUID FILLED ORAL WEEKLY
Qty: 12 CAPSULE | Refills: 1 | Status: SHIPPED | OUTPATIENT
Start: 2024-11-19

## 2024-11-19 NOTE — TELEPHONE ENCOUNTER
LOV: 11/8/2024 with Dr. Anderson  RTC: 12 months  Last Relevant Labs: 11/15/2024  Filled: 4/3/2024   #12 with 1 refill    Future Appointments   Date Time Provider Department Center   12/4/2024  3:15 PM Beverly Preston APRN [facility]

## 2024-11-20 DIAGNOSIS — E78.2 MIXED HYPERLIPIDEMIA: Chronic | ICD-10-CM

## 2024-11-20 DIAGNOSIS — Z79.4 TYPE 2 DIABETES MELLITUS WITH HYPERGLYCEMIA, WITH LONG-TERM CURRENT USE OF INSULIN (HCC): Chronic | ICD-10-CM

## 2024-11-20 DIAGNOSIS — E11.65 TYPE 2 DIABETES MELLITUS WITH HYPERGLYCEMIA, WITH LONG-TERM CURRENT USE OF INSULIN (HCC): Chronic | ICD-10-CM

## 2024-11-20 RX ORDER — ROSUVASTATIN CALCIUM 10 MG/1
10 TABLET, COATED ORAL DAILY
Qty: 90 TABLET | Refills: 3 | Status: SHIPPED | OUTPATIENT
Start: 2024-11-20

## 2024-11-20 NOTE — TELEPHONE ENCOUNTER
Protocol passed     Requesting Rosuvastatin  LOV: 11/08/24  RTC: 1 year  Last Relevant Labs: 11/15/24  Filled: 01/08/24 #90 with 3 refills    Future Appointments   Date Time Provider Department Center   12/4/2024  3:15 PM Beverly Preston, FINN [facility]

## 2024-12-05 DIAGNOSIS — E11.65 TYPE 2 DIABETES MELLITUS WITH HYPERGLYCEMIA, WITH LONG-TERM CURRENT USE OF INSULIN (HCC): ICD-10-CM

## 2024-12-05 DIAGNOSIS — Z79.4 TYPE 2 DIABETES MELLITUS WITH HYPERGLYCEMIA, WITH LONG-TERM CURRENT USE OF INSULIN (HCC): ICD-10-CM

## 2024-12-05 RX ORDER — TIRZEPATIDE 2.5 MG/.5ML
INJECTION, SOLUTION SUBCUTANEOUS
Qty: 2 ML | Refills: 0 | Status: SHIPPED | OUTPATIENT
Start: 2024-12-05

## 2024-12-05 NOTE — TELEPHONE ENCOUNTER
Requested Prescriptions     Pending Prescriptions Disp Refills    MOUNJARO 2.5 MG/0.5ML Subcutaneous Solution Auto-injector [Pharmacy Med Name: MOUNJARO 2.5MG/0.5ML INJ ( 4 PENS)] 2 mL 0     Sig: ADMINISTER 2.5 MG UNDER THE SKIN 1 TIME A WEEK     Future Appointments   Date Time Provider Department Center   4/16/2025  3:30 PM Beverly Preston APRN [facility]     Last A1c value was 6.9% done 6/5/2024.  Refill 6/5/24 Tanya   LOV  06/05/2024 Tanya

## 2025-01-03 ENCOUNTER — PATIENT MESSAGE (OUTPATIENT)
Dept: ENDOCRINOLOGY CLINIC | Facility: CLINIC | Age: 60
End: 2025-01-03

## 2025-01-03 DIAGNOSIS — E11.65 TYPE 2 DIABETES MELLITUS WITH HYPERGLYCEMIA, WITH LONG-TERM CURRENT USE OF INSULIN (HCC): ICD-10-CM

## 2025-01-03 DIAGNOSIS — Z79.4 TYPE 2 DIABETES MELLITUS WITH HYPERGLYCEMIA, WITH LONG-TERM CURRENT USE OF INSULIN (HCC): ICD-10-CM

## 2025-01-03 RX ORDER — TIRZEPATIDE 2.5 MG/.5ML
INJECTION, SOLUTION SUBCUTANEOUS
Qty: 2 ML | Refills: 0 | OUTPATIENT
Start: 2025-01-03

## 2025-01-03 RX ORDER — TIRZEPATIDE 2.5 MG/.5ML
INJECTION, SOLUTION SUBCUTANEOUS
Qty: 2 ML | Refills: 3 | Status: SHIPPED | OUTPATIENT
Start: 2025-01-03

## 2025-01-03 NOTE — TELEPHONE ENCOUNTER
Patient left message - had received a My Chart Message that prescription was denied, is on his last injection, was not sure what was going on - pharmacy had told him they needed to send a request from FINN Hill's office.     Call back to patient- there were multiple requests- FINN Hill did sent the prescription to pharmacy this morning- should not be any further issues, but let us know. Previously sent by FINN Gomez.     No further questions/concerns at this time.       Last office visit and Provider: 6/5/24 - FINN Hill  Future Appointments   Date Time Provider Department Center   4/16/2025  3:30 PM Beverly Preston APRN [facility]   Last A1c value was 6.9% done 6/5/2024.

## 2025-01-03 NOTE — TELEPHONE ENCOUNTER
Requested Prescriptions     Pending Prescriptions Disp Refills    MOUNJARO 2.5 MG/0.5ML Subcutaneous Solution Auto-injector [Pharmacy Med Name: MOUNJARO 2.5MG/0.5ML INJ ( 4 PENS)] 2 mL 0     Sig: ADMINISTER 2.5 MG UNDER THE SKIN 1 TIME A WEEK     HGBA1C:    Lab Results   Component Value Date    A1C 6.9 (A) 06/05/2024    A1C 9.1 (A) 02/07/2024    A1C 7.2 (A) 09/06/2023     (H) 04/28/2023     Your Appointments      Wednesday April 16, 2025 3:30 PM  Diabetes Pump follow up with FINN Fuetnes  [facility] ([facility]) [address]  [facility] [id number] [phone]             Last OFFICE VISIT: 6-5-2024-CB    Last refill:  12-5-2024-

## 2025-01-06 ENCOUNTER — TELEPHONE (OUTPATIENT)
Age: 60
End: 2025-01-06

## 2025-01-06 NOTE — TELEPHONE ENCOUNTER
Received PA fax for mounjaro 2.5 mg starting through M - pt to confirm if insurance has changed for this year or not

## 2025-01-06 NOTE — TELEPHONE ENCOUNTER
Started PA serinaugn DRE RENE (Key: [id number])    Outcome  Additional Information Required  Your PA has been resolved, no additional PA is required. For further inquiries please contact the number on the back of the member prescription card. (Message [phone])

## 2025-01-06 NOTE — TELEPHONE ENCOUNTER
Started PA serinaugn DRE RENE (Key: [id number])    Outcome  Additional Information Required  Your PA has been resolved, no additional PA is required. For further inquiries please contact the number on the back of the member prescription card. (Message [phone])

## 2025-01-11 ENCOUNTER — IMMUNIZATION (OUTPATIENT)
Dept: LAB | Age: 60
End: 2025-01-11
Attending: EMERGENCY MEDICINE
Payer: COMMERCIAL

## 2025-01-11 DIAGNOSIS — Z23 NEED FOR VACCINATION: Primary | ICD-10-CM

## 2025-01-11 PROCEDURE — 90480 ADMN SARSCOV2 VAC 1/ONLY CMP: CPT

## 2025-01-13 RX ORDER — INSULIN GLARGINE 300 U/ML
INJECTION, SOLUTION SUBCUTANEOUS
Qty: 18 ML | Refills: 0 | Status: SHIPPED | OUTPATIENT
Start: 2025-01-13

## 2025-01-13 NOTE — TELEPHONE ENCOUNTER
Requested Prescriptions     Pending Prescriptions Disp Refills    TOUJEO MAX SOLOSTAR 300 UNIT/ML Subcutaneous Solution Pen-injector [Pharmacy Med Name: TOUJEO MAX SOLOSTAR 300U/ML PEN 3ML] 18 mL 0     Sig: ADMINISTER UP TO 60 UNITS UNDER THE SKIN DAILY AS DIRECTED     Your appointments       Date & Time Appointment Department ([place])    Apr 16, 2025 3:30 PM CDT Diabetes Pump follow up with Beverly Preston APRN [facility] ([facility]              [facility]  [address]  [facility] [id number] [phone]          Last A1c value was 6.9% done 6/5/2024.    Refill 7/31/24  LOV 6/5/24

## 2025-01-17 RX ORDER — DAPAGLIFLOZIN AND METFORMIN HYDROCHLORIDE 5; 1000 MG/1; MG/1
2 TABLET, FILM COATED, EXTENDED RELEASE ORAL DAILY
Qty: 180 TABLET | Refills: 1 | Status: SHIPPED | OUTPATIENT
Start: 2025-01-17

## 2025-01-17 NOTE — TELEPHONE ENCOUNTER
Requested Prescriptions     Pending Prescriptions Disp Refills    XIGDUO XR 5-1000 MG Oral Tablet 24 Hr [Pharmacy Med Name: XIGDUO XR 5MG/1000MG TABLETS] 180 tablet 1     Sig: TAKE 2 TABLETS BY MOUTH DAILY     HGBA1C:    Lab Results   Component Value Date    A1C 6.9 (A) 06/05/2024    A1C 9.1 (A) 02/07/2024    A1C 7.2 (A) 09/06/2023     (H) 04/28/2023     Your Appointments      Wednesday April 16, 2025 3:30 PM  Diabetes Pump follow up with FINN Fuentes  [facility] ([facility]) [address] [id number] [phone]             Last OFFICE VISIT: 6-5-2024-CB    Last refill:  8--180 tabs with 1 refills-CB

## 2025-03-24 NOTE — TELEPHONE ENCOUNTER
LOV: 11/8/2024 with Dr. Anderson  RTC: 12 months  Last Relevant Labs: 11/15/2024  Filled: 11/19/2024    #12 with 1 refill    Future Appointments   Date Time Provider Department Center   4/16/2025  3:30 PM Beverly Preston APRN [facility]

## 2025-03-25 RX ORDER — ERGOCALCIFEROL 1.25 MG/1
50000 CAPSULE, LIQUID FILLED ORAL WEEKLY
Qty: 12 CAPSULE | Refills: 1 | Status: SHIPPED | OUTPATIENT
Start: 2025-03-25

## 2025-03-26 RX ORDER — INSULIN GLARGINE 300 U/ML
INJECTION, SOLUTION SUBCUTANEOUS
Qty: 18 ML | Refills: 0 | Status: SHIPPED | OUTPATIENT
Start: 2025-03-26

## 2025-03-26 NOTE — TELEPHONE ENCOUNTER
Requested Prescriptions     Pending Prescriptions Disp Refills    LIVIA HAIR SOLOSTAR 300 UNIT/ML Subcutaneous Solution Pen-injector [Pharmacy Med Name: TOUJEO MAX SOLOSTAR 300U/ML PEN 3ML] 18 mL 0     Sig: ADMINISTER UP TO 60 UNITS UNDER THE SKIN DAILY AS DIRECTED      Your appointments       Date & Time Appointment Department ([place])    Apr 16, 2025 3:30 PM CDT Diabetes Pump follow up with Beverly Preston APRN [facility] ([facility])              [facility]  [address]  [place] [id number] [phone]           Last A1c value was 6.9% done 6/5/2024.   Last office visit: 6/5/24 - CB  Last refill: 1/13/25 - CB

## 2025-04-16 ENCOUNTER — OFFICE VISIT (OUTPATIENT)
Dept: ENDOCRINOLOGY CLINIC | Facility: CLINIC | Age: 60
End: 2025-04-16
Payer: COMMERCIAL

## 2025-04-16 VITALS
RESPIRATION RATE: 16 BRPM | DIASTOLIC BLOOD PRESSURE: 72 MMHG | OXYGEN SATURATION: 98 % | SYSTOLIC BLOOD PRESSURE: 130 MMHG | WEIGHT: 223.81 LBS | HEART RATE: 93 BPM | BODY MASS INDEX: 30 KG/M2

## 2025-04-16 DIAGNOSIS — E66.811 OBESITY, CLASS I, BMI 30-34.9: ICD-10-CM

## 2025-04-16 DIAGNOSIS — E55.9 VITAMIN D DEFICIENCY: ICD-10-CM

## 2025-04-16 DIAGNOSIS — E78.2 MIXED HYPERLIPIDEMIA: Chronic | ICD-10-CM

## 2025-04-16 DIAGNOSIS — Z79.4 TYPE 2 DIABETES MELLITUS WITH HYPERGLYCEMIA, WITH LONG-TERM CURRENT USE OF INSULIN (HCC): Primary | Chronic | ICD-10-CM

## 2025-04-16 DIAGNOSIS — E11.65 TYPE 2 DIABETES MELLITUS WITH HYPERGLYCEMIA, WITH LONG-TERM CURRENT USE OF INSULIN (HCC): Primary | Chronic | ICD-10-CM

## 2025-04-16 LAB — HEMOGLOBIN A1C: 7.3 % (ref 4.3–5.6)

## 2025-04-16 PROCEDURE — 83036 HEMOGLOBIN GLYCOSYLATED A1C: CPT | Performed by: NURSE PRACTITIONER

## 2025-04-16 PROCEDURE — 3075F SYST BP GE 130 - 139MM HG: CPT | Performed by: NURSE PRACTITIONER

## 2025-04-16 PROCEDURE — 99214 OFFICE O/P EST MOD 30 MIN: CPT | Performed by: NURSE PRACTITIONER

## 2025-04-16 PROCEDURE — 3078F DIAST BP <80 MM HG: CPT | Performed by: NURSE PRACTITIONER

## 2025-04-16 NOTE — PATIENT INSTRUCTIONS
A1c  7.3 % ( last A1C 6.9%)     Have your labs done after you meet w Dr Anderson - to see if he wants labs done       Decrease Toujeo max solostar: 30--> 26  units once daily       Continue     Xigduo XR 5-1000mg : 2 tab daily   Mounjaro 2.5mg subcutaneous once weekly         Fiasp (aspart) scale before meals 3 x daily       If blood sugar is 175-200 dose 2 units of insulin   If blood sugar is 201-250  dose 3 units of insulin  If blood sugar is over 251, dose 4units of insulin     Fluctuations in blood sugars are best detected by testing your sugar with your meter.   In order for me to determine any patterns in your blood sugars, you will need to test your blood sugar 2 times daily     It would be best to change up the times of day that you are testing your sugar.   Always test before breakfast (fasting) and then alternate testing blood sugar -2 hours after your meals.     American Diabetes Association: blood sugar targets:     Fasting blood sugar (before breakfast) Target:    (ideally less than 110)  2 hours after eating less than 180 (ideally less than  150 )     Call for blood sugars less than  75 or greater than  200 more than 2 times in a week       Watch for low blood sugars: (less than 70 )    Treatment of Low Blood Glucose Action Plan  1. Check blood glucose to be sure that it is low. You cannot  always go by symptoms or how you feel. If in doubt, treat your low blood glucose anyway.  Rule of 15 :     2. Take 15 grams of carbohydrate (carb). Here are some choices:    4 oz. regular fruit juice  3-4 glucose tablets  6 oz. regular soda   7-8 jelly beans    3. Recheck blood glucose after 10-15 minutes. If blood glucose is still low (less than 70 mg/dl) repeat the treatment (step 2).    4. If your next meal is more than one hour away, eat a small snack.    5. If you’re not sure what caused your low blood glucose, call your healthcare provider.    6. Always check your blood glucose before you drive       To  treat a low, I recommend you carry with you easy, pre-portioned treatment for low blood sugars that are 15G of carbs:   - Children sized squeeze pouch applesauce (high fiber + carbs help prevent too high of a spike)  - Small children's sized juicebox- 15g carb --> 4oz juice box  - Glucose tablets from [employer], you can find them near diabetes supplies --> Note, you will need to eat 3-4 tablets to get to 15g of carbs  - Children sized fruit snack pack- look for one with 15 grams of total carbohydrate    AVOID complex carbs, or foods that contain fats along with carbs (like chocolate) can slow the absorption of glucose and should NOT be used to treat an emergency low

## 2025-04-16 NOTE — PROGRESS NOTES
"----- Message from Светлана Guido sent at 8/26/2022  1:44 PM CDT -----  Consult/Advisory:           Name Of Caller: self    Contact Preference?: [phone]    What is the nature of the call?: requesting a call back to follow up on status of Revlamid prescription           Additional Notes:  "Thank you for all that you do for our patients'"     " Prashant Patel is a 59 year old  presents today for type 2 diabetes management.   Primary care physician: Jeana Anderson MD     Last Diabetes appointment with me : 6-2024        Today's A1C 7.3 % ( last A1C 6.9%)   Denies any PMH updates  C/o feeling Feels more fatigued. Needs to schedule appointment w Dr Anderson     Using livongo through insurance program (NOT interested in personal sensor --> cost)   Lowest BG  116  mg/dl   Highest 156  mg/dl   Only testing pre breakfast   + hypoglycemia sxs   (<  1 x month) --> typically early AM     14 day average: 138 mg/d            Diabetes History:  Type 2 DM ~   Patient has not had hospitalizations for blood sugar issues  POSITIVE history of pancreatitis ( ETOH induced) 2018 ; confirmed by Lipase/imaging      Previous DM therapies:  levemir ; change in therapy   [employer] insurance formulary 2022   Januvia + panreatitis 2018   Ozempic: severe nausea , abd pain sxs    Glipizide XL 5mg 3-2023     Current DM Regimen:  Xigduo XR 5-1000mg : 2 tab daily   Mounjaro 2.5mg subcutaneous once weekly  (tolerance dose)     Toujeo max solostar: 30 units once daily       Fiasp (aspart) scale before meals 3 x daily --> 1 x week       If blood sugar is 175-200 dose 2 units of insulin   If blood sugar is 201-250  dose 3 units of insulin  If blood sugar is over 251, dose 4units of insulin           HGBA1C:    Lab Results   Component Value Date    A1C 7.3 (A) 04/16/2025    A1C 6.9 (A) 06/05/2024    A1C 9.1 (A) 02/07/2024     (H) 04/28/2023         Lab Results   Component Value Date    CHOLEST 144 11/15/2024    CHOLEST 117 11/10/2023    TRIG 201 (H) 11/15/2024    TRIG 195 (H) 11/10/2023    HDL 44 11/15/2024    HDL 39 (L) 11/10/2023    LDL 67 11/15/2024    LDL 46 11/10/2023     Lab Results   Component Value Date    MICROALBCREA 8.1 02/07/2024    MICROALBCREA 13.4 02/15/2023      Lab Results   Component Value Date    CREATSERUM 0.88 11/15/2024    CREATSERUM 0.78 03/01/2024     EGFRCR 100 11/15/2024    EGFRCR 105 03/01/2024     Lab Results   Component Value Date    AST 34 (H) 11/15/2024    AST 29 11/10/2023    ALT 42 11/15/2024    ALT 58 11/10/2023       Lab Results   Component Value Date    TSH 0.505 07/09/2021    TSH 0.412 02/07/2020    T4F 1.0 08/08/2018           DM Complications:  Microvascular:   Neuropathy: yes  Retinopathy: yes  Nephropathy: no    Macrovascular:  PVD: no  CAD: no  Stroke/CVA: no        Modifying factors:  Medication adherence: yes   Recent steroids, illness or infections ( past 3m): no     Allergies: Patient has no known allergies.    Past Medical History:    Abnormal EKG    Alcohol withdrawal syndrome with complication (HCC)    Alcoholic pancreatitis (HCC)    Calculus of kidney    COVID-19 virus infection    Diabetes (HCC)    Fistula    Iron deficiency anemia, unspecified    Normocytic anemia    Sinus tachycardia    Thrombocytopenia    Visual impairment    glasses     Past Surgical History:   Procedure Laterality Date    Colonoscopy  10/22/2018    Cystoscopy,insert ureteral stent      kidney stones x2    Other surgical history  10 -     Bump neck    Other surgical history      surgery in 2011 and 2015 on anus    Vasectomy       Social History     Socioeconomic History    Marital status:    Tobacco Use    Smoking status: Never    Smokeless tobacco: Never   Vaping Use    Vaping status: Never Used   Substance and Sexual Activity    Alcohol use: No     Comment: Stopped drinking alcohol 8/7/2018    Drug use: No   Other Topics Concern    Caffeine Concern No    Exercise Yes     Comment: Walk    Seat Belt No    Special Diet No    Stress Concern No    Weight Concern No     Family History   Problem Relation Age of Onset    Other (Cancer) Mother         Stomach     Cancer Mother     Diabetes Mother      Current Medication List:   Current Outpatient Medications   Medication Sig Dispense Refill    TOUJEO MAX SOLOSTAR 300 UNIT/ML Subcutaneous Solution Pen-injector  ADMINISTER UP TO 60 UNITS UNDER THE SKIN DAILY AS DIRECTED 18 mL 0    ERGOCALCIFEROL 1.25 MG (25112 UT) Oral Cap TAKE 1 CAPSULE BY MOUTH 1 TIME A WEEK 12 capsule 1    XIGDUO XR 5-1000 MG Oral Tablet 24 Hr TAKE 2 TABLETS BY MOUTH DAILY 180 tablet 1    Tirzepatide (MOUNJARO) 2.5 MG/0.5ML Subcutaneous Solution Auto-injector ADMINISTER 2.5 MG UNDER THE SKIN 1 TIME A WEEK 2 mL 3    ROSUVASTATIN 10 MG Oral Tab TAKE 1 TABLET(10 MG) BY MOUTH DAILY 90 tablet 3    tamsulosin 0.4 MG Oral Cap Take 1 capsule (0.4 mg total) by mouth every evening. 90 capsule 6    Insulin Aspart, w/Niacinamide, (FIASP FLEXTOUCH) 100 UNIT/ML Subcutaneous Solution Pen-injector INJECT UP TO 20 UNITS UNDER THE SKIN AS DIRECTED BEFORE MEALS BASED ON BLOOD SUGAR READING 18 mL 0    Tadalafil (CIALIS) 20 MG Oral Tab Take 0.5-1 tablets (10-20 mg total) by mouth daily as needed for Erectile Dysfunction. 30 tablet 3    Polyethylene Glycol 3350 (MIRALAX) 17 g Oral Powd Pack Take 17 g by mouth daily as needed (Take to avoid constipation, especially if taking narcotic pain medications.). 20 packet 1    BD PEN NEEDLE SHA U/F 32G X 4 MM Does not apply Misc Uses up to 3  x daily 300 each 3           DM associated review of  symptoms:   Endocrine: Polyuria, polyphagia, polydipsia: no  Neurological: Paresthesias: no  HEENT: Blurred vision: no  Skin: + groin rash . NO  wounds  Hematological: Hypoglycemia: no      Review of Systems     LUNGS: denies shortness of breath   CARDIOVASCULAR: denies chest pain  GI: denies abdominal pain, nausea or diarrhea   : denies dysuria        Physical exam:  /72   Pulse 93   Resp 16   Wt 223 lb 12.8 oz (101.5 kg)   SpO2 98%   BMI 30.35 kg/m²   Body mass index is 30.35 kg/m².    Physical Exam   Vitals reviewed.  Constitutional: Normal appearance   Cardiovascular: Normal rate   Pulmonary/Chest: Effort normal  Neurological: Alert and oriented .   Psychiatric: Normal mood and affect.   Musculoskeletal:  Diabetes foot exam:    Good foot hygiene.   Bilateral barefoot skin diabetic exam: normal.  Visualized feet and the appearance : normal.  Bilateral monofilament/sensation of both feet is normal. Vibration to dorsum to the first toe perceived.   Bilateral 2+ pedal pulse pedal pulse exam         Assessment/Plan:    Class 1 obesity due to excess calories with serious comorbidity   Weight: 223 lb   No change in weight however down waist inseam 36--> 32 in   Continue to focus on improving diet, lifestyle modifications   Declines dose increase on Mounjaro-> see dm note     VIT D Deficiency   Update level     Fatigue  Check tsh           Dyslipidemia:   Last  labs    -update ordered.   Continue Rosuvastatin 10mg once allen       Type 2 diabetes mellitus with hyperglycemia, with long-term current use of insulin ([facility])  A1C 7.3  % (last A1C 6.9 %)   Weight: 223   lb today (last weight 222 lb )   Diabetes control is increased - but needs ongoing management and evaluation  given the chronicity of Diabetes, ongoing medication monitoring and risk for complications     Diabetes control is improving but needs ongoing management and evaluation  given the chronicity of Diabetes, ongoing medication monitoring and risk for complications     Decrease  Toujeo max solostar: 30  --> 26 units once daily     Continue   Xigduo XR 5-1000mg  2 tab daily   Mounjaro 2.5mg once weekly (tolerance dose)     (--> Resistant to increase Mounjaro dose since bad experience w ozempic titration)         Fiasp (aspart) scale before meals 3 x daily     If blood sugar is 175-200 dose 2 units of insulin   If blood sugar is 201-250  dose 3 units of insulin  If blood sugar is over 251, dose 4units of insulin     Reminded to call if any rash, itching in genital area or painful urination develops and reviewed  importance of hydration. Reviewed Sick day management and avoidance of keto diets     Continue site rotation for subcutaneous injections     Not interested in CGM (cost) -  employer provides [employer] meter/supplies free of charge.   Reviewed clinical significance of A1c, adverse effects of suboptimal glucose control, and goals of therapy   Reviewed the A1C test, what the value reflects and the goal for the patient.   Reminded pt on A1C and blood sugar targets (Fasting < 130 and post prandial <180 ) and complications associated with hyperglycemia and uncontrolled DM (on AVS)   Recommended SMBG 3- x daily if not using CGM   Reviewed s/s and treatment of hypoglycemia (on AVS)   Continue with lifestyle modifications since they have positive impact on diabetes/blood sugars/health (portion control, physical activity, weight loss)   Reinforced timing and adherence with medication, self-monitoring of blood glucose and routine follow up    The patient is asked to return in- 5 m . Recommended to contact DM clinic sooner if questions or concerns.    The patient indicates understanding of these issues and agrees to the plan.      Orders Placed This Encounter    Microalb/Creat Ratio, Random Urine     Standing Status:   Future     Expected Date:   4/16/2025     Expiration Date:   4/16/2026     Release to patient:   Immediate    POC Hemoglobin A1C     Release to patient:   Immediate    Comp Metabolic Panel (14)     Standing Status:   Future     Expected Date:   4/16/2025     Expiration Date:   4/16/2026    Lipid Panel     Standing Status:   Future     Expected Date:   4/16/2025     Expiration Date:   4/16/2026    TSH W Reflex To Free T4     Standing Status:   Future     Expected Date:   4/16/2025     Expiration Date:   4/16/2026     Release to patient:   Immediate    Vitamin D     Standing Status:   Future     Expected Date:   4/16/2025     Expiration Date:   4/16/2026     Please pick the scenario that best fits the purpose for ordering this test:   General Screening/Vit D deficiency (25-Hydroxy)     Release to patient:   Immediate     Diabetes complications & risks surveillance:   A1C/Blood pressure:  as reported    Last dilated eye exam: Last Dilated Eye Exam: 08/03/24   Exam shows retinopathy? Eye Exam shows Diabetic Retinopathy?: No  Last diabetic foot exam: Last Foot Exam: 04/16/25  Nephropathy screening:   No indication for ace /arb rx.    Lab Results   Component Value Date    EGFRCR 100 11/15/2024    MICROALBCREA 8.1 02/07/2024     LIPID screening:    Lab Results   Component Value Date    CHOLEST 144 11/15/2024    LDL 67 11/15/2024    TRIG 201 (H) 11/15/2024    HDL 44 11/15/2024    FASTING Yes 11/15/2024    FASTING Yes 11/15/2024     Cholesterol Lowering Medications            ROSUVASTATIN 10 MG Oral Tab                 The risks and benefits of my recommendations, as well as other treatment options were discussed with the patient today. questions were also answered to the best of my knowledge.

## 2025-05-20 RX ORDER — INSULIN GLARGINE 300 U/ML
60 INJECTION, SOLUTION SUBCUTANEOUS DAILY
Qty: 18 ML | Refills: 0 | Status: SHIPPED | OUTPATIENT
Start: 2025-05-20

## 2025-05-20 NOTE — TELEPHONE ENCOUNTER
Requested Prescriptions     Pending Prescriptions Disp Refills    TOUJEO MAX SOLOSTAR 300 UNIT/ML Subcutaneous Solution Pen-injector [Pharmacy Med Name: TOUJEO MAX SOLOSTAR 300U/ML PEN 3ML] 18 mL 0     Sig: ADMINISTER UP TO 60 UNITS UNDER THE SKIN DAILY AS DIRECTED     No future appointments.  Last A1c value was 7.3% done 4/16/2025.  Refill 03/26/25 Tanya   LOV 04/16/25 Tanya     RTC 4 months

## 2025-05-30 RX ORDER — INSULIN GLARGINE 300 U/ML
60 INJECTION, SOLUTION SUBCUTANEOUS DAILY
Qty: 18 ML | Refills: 0 | Status: SHIPPED | OUTPATIENT
Start: 2025-05-30

## 2025-05-30 NOTE — TELEPHONE ENCOUNTER
Requested Prescriptions     Pending Prescriptions Disp Refills    TOUJEO MAX SOLOSTAR 300 UNIT/ML Subcutaneous Solution Pen-injector [Pharmacy Med Name: TOUJEO MAX SOLOSTAR 300U/ML PEN 3ML] 18 mL 0     Sig: ADMINISTER UP TO 60 UNITS UNDER THE SKIN DAILY AS DIRECTED      Last A1c value was 7.3% done 4/16/2025.   Last office visit: 4/16/25 - CB  Last refill: 5/20/25 - CB      To pharmacy: ZERO refills remain on this prescription. Your patient is requesting advance approval of refills for this medication to PREVENT ANY MISSED DOSES     Last ordered: 1 week ago (5/20/2025) by FINN Fuentes    Last refill: 5/24/2025    Rx #: [id number]

## 2025-06-20 ENCOUNTER — PATIENT MESSAGE (OUTPATIENT)
Dept: ENDOCRINOLOGY CLINIC | Facility: CLINIC | Age: 60
End: 2025-06-20

## 2025-06-20 DIAGNOSIS — Z79.4 TYPE 2 DIABETES MELLITUS WITH HYPERGLYCEMIA, WITH LONG-TERM CURRENT USE OF INSULIN (HCC): ICD-10-CM

## 2025-06-20 DIAGNOSIS — E11.65 TYPE 2 DIABETES MELLITUS WITH HYPERGLYCEMIA, WITH LONG-TERM CURRENT USE OF INSULIN (HCC): ICD-10-CM

## 2025-06-20 NOTE — TELEPHONE ENCOUNTER
Requested Prescriptions     Pending Prescriptions Disp Refills    MOUNJARO 2.5 MG/0.5ML Subcutaneous Solution Auto-injector [Pharmacy Med Name: MOUNJARO 2.5MG/0.5ML INJ ( 4 PENS)] 2 mL 3     Sig: ADMINISTER 2.5 MG UNDER THE SKIN 1 TIME A WEEK     HGBA1C:    Lab Results   Component Value Date    A1C 7.3 (A) 04/16/2025    A1C 6.9 (A) 06/05/2024    A1C 9.1 (A) 02/07/2024     (H) 04/28/2023         Last Office Visit:  4--CB    Last Refill:  1-3-2025-2 ml with 3 refills-CB

## 2025-06-23 RX ORDER — TIRZEPATIDE 2.5 MG/.5ML
INJECTION, SOLUTION SUBCUTANEOUS
Qty: 2 ML | Refills: 3 | Status: SHIPPED | OUTPATIENT
Start: 2025-06-23

## 2025-06-23 NOTE — TELEPHONE ENCOUNTER
Responded to patient under refill dated today.    Future Appointments   Date Time Provider Department Center   8/1/2025  8:30 AM Jeana Anderson MD [facility] [place]   11/4/2025  3:45 PM Beverly Preston APRN [id number] [place]

## 2025-06-23 NOTE — TELEPHONE ENCOUNTER
Last office visit 04/2025, asked to return in 5 months.    Future Appointments   Date Time Provider Department Center   8/1/2025  8:30 AM Jeana Anderson MD [facility] [place]   11/4/2025  3:45 PM Beverly Preston APRN [id number] [place]     Last visit notes Mounjaro 2.5 mg as tolerance dose.

## 2025-07-14 RX ORDER — PEN NEEDLE, DIABETIC 32GX 5/32"
1 NEEDLE, DISPOSABLE MISCELLANEOUS 3 TIMES DAILY
Qty: 300 EACH | Refills: 3 | Status: SHIPPED | OUTPATIENT
Start: 2025-07-14

## 2025-07-14 NOTE — TELEPHONE ENCOUNTER
Requested Prescriptions     Signed Prescriptions Disp Refills    BD PEN NEEDLE SHA 2ND GEN 32G X 4 MM Does not apply Misc 300 each 3     Sig: USE UP TO THREE TIMES DAILY     Authorizing Provider: BEVERLY ALEOJ     Ordering User: BOBBY SIEGEL     Future Appointments   Date Time Provider Department Center   8/1/2025  8:30 AM Jeana Anderson MD [facility] [place]   11/4/2025  3:45 PM Beverly Alejo APRN [id number] [place]

## 2025-07-16 RX ORDER — DAPAGLIFLOZIN AND METFORMIN HYDROCHLORIDE 5; 1000 MG/1; MG/1
2 TABLET, FILM COATED, EXTENDED RELEASE ORAL DAILY
Qty: 180 TABLET | Refills: 1 | Status: SHIPPED | OUTPATIENT
Start: 2025-07-16

## 2025-07-16 NOTE — TELEPHONE ENCOUNTER
Requested Prescriptions     Pending Prescriptions Disp Refills    XIGDUO XR 5-1000 MG Oral Tablet 24 Hr [Pharmacy Med Name: XIGDUO XR 5MG/1000MG TABLETS] 180 tablet 1     Sig: TAKE 2 TABLETS BY MOUTH DAILY     HGBA1C:    Lab Results   Component Value Date    A1C 7.3 (A) 04/16/2025    A1C 6.9 (A) 06/05/2024    A1C 9.1 (A) 02/07/2024     (H) 04/28/2023     Your Appointments      Friday August 01, 2025 8:30 AM  Follow Up Visit with Jeana Anderson MD  [facility] [address]  [facility], [facility] [id number] [phone]   Contact your primary care provider if your insurance requires a referral.    Please arrive 15 minutes prior to your scheduled appointment. Be sure to bring your current Insurance card, Photo ID, and medication bottles or a list of your current medications.      A 24 hour notice is required to cancel any appointment or you may be charged a $40 No Show Fee.     Important: 24 hour notice is required to cancel any appointment or you may be charged a $40 No Show Fee. Please notify your physician office.          Tuesday November 04, 2025 3:45 PM  Follow Up Visit with FINN Fuentes  [facility] [address]  [facility], [facility] [id number] [phone]   Contact your primary care provider if your insurance requires a referral.    Please arrive 15 minutes prior to your scheduled appointment. Be sure to bring your current Insurance card, Photo ID, and medication bottles or a list of your current medications.      A 24 hour notice is required to cancel any appointment or you may be charged a $40 No Show Fee.     Important: 24 hour notice is required to cancel any appointment or you may be charged a $40 No Show Fee. Please notify your physician office.                 Last Office Visit:  4--CB    Last Refill: 1--180 tabs  with 1 refills-CB

## 2025-07-18 DIAGNOSIS — N52.9 ERECTILE DYSFUNCTION, UNSPECIFIED ERECTILE DYSFUNCTION TYPE: ICD-10-CM

## 2025-07-21 RX ORDER — TADALAFIL 20 MG/1
TABLET ORAL DAILY PRN
Qty: 30 TABLET | Refills: 3 | Status: SHIPPED | OUTPATIENT
Start: 2025-07-21

## 2025-07-28 RX ORDER — TAMSULOSIN HYDROCHLORIDE 0.4 MG/1
0.4 CAPSULE ORAL EVERY EVENING
Qty: 90 CAPSULE | Refills: 6 | OUTPATIENT
Start: 2025-07-28

## 2025-08-01 ENCOUNTER — OFFICE VISIT (OUTPATIENT)
Dept: INTERNAL MEDICINE CLINIC | Facility: CLINIC | Age: 60
End: 2025-08-01

## 2025-08-01 VITALS
RESPIRATION RATE: 18 BRPM | SYSTOLIC BLOOD PRESSURE: 122 MMHG | TEMPERATURE: 97 F | HEART RATE: 86 BPM | DIASTOLIC BLOOD PRESSURE: 72 MMHG | OXYGEN SATURATION: 98 % | BODY MASS INDEX: 29.53 KG/M2 | WEIGHT: 218 LBS | HEIGHT: 72 IN

## 2025-08-01 DIAGNOSIS — E78.2 MIXED HYPERLIPIDEMIA: Primary | Chronic | ICD-10-CM

## 2025-08-01 DIAGNOSIS — Z23 NEED FOR PNEUMOCOCCAL VACCINATION: ICD-10-CM

## 2025-08-01 DIAGNOSIS — Z12.5 SCREENING FOR MALIGNANT NEOPLASM OF PROSTATE: ICD-10-CM

## 2025-08-01 DIAGNOSIS — N52.9 ERECTILE DYSFUNCTION, UNSPECIFIED ERECTILE DYSFUNCTION TYPE: ICD-10-CM

## 2025-08-01 DIAGNOSIS — Z79.4 TYPE 2 DIABETES MELLITUS WITH HYPERGLYCEMIA, WITH LONG-TERM CURRENT USE OF INSULIN (HCC): Chronic | ICD-10-CM

## 2025-08-01 DIAGNOSIS — E11.65 TYPE 2 DIABETES MELLITUS WITH HYPERGLYCEMIA, WITH LONG-TERM CURRENT USE OF INSULIN (HCC): Chronic | ICD-10-CM

## 2025-08-01 DIAGNOSIS — R39.12 BENIGN PROSTATIC HYPERPLASIA WITH WEAK URINARY STREAM: ICD-10-CM

## 2025-08-01 DIAGNOSIS — N40.1 BENIGN PROSTATIC HYPERPLASIA WITH WEAK URINARY STREAM: ICD-10-CM

## 2025-08-01 DIAGNOSIS — E55.9 VITAMIN D DEFICIENCY: ICD-10-CM

## 2025-08-01 PROCEDURE — 90677 PCV20 VACCINE IM: CPT | Performed by: INTERNAL MEDICINE

## 2025-08-01 PROCEDURE — 3008F BODY MASS INDEX DOCD: CPT | Performed by: INTERNAL MEDICINE

## 2025-08-01 PROCEDURE — 3074F SYST BP LT 130 MM HG: CPT | Performed by: INTERNAL MEDICINE

## 2025-08-01 PROCEDURE — 90471 IMMUNIZATION ADMIN: CPT | Performed by: INTERNAL MEDICINE

## 2025-08-01 PROCEDURE — 99214 OFFICE O/P EST MOD 30 MIN: CPT | Performed by: INTERNAL MEDICINE

## 2025-08-01 PROCEDURE — 3051F HG A1C>EQUAL 7.0%<8.0%: CPT | Performed by: INTERNAL MEDICINE

## 2025-08-01 PROCEDURE — 3078F DIAST BP <80 MM HG: CPT | Performed by: INTERNAL MEDICINE

## 2025-08-01 RX ORDER — ROSUVASTATIN CALCIUM 10 MG/1
10 TABLET, COATED ORAL DAILY
Qty: 90 TABLET | Refills: 3 | Status: SHIPPED | OUTPATIENT
Start: 2025-08-01

## 2025-08-01 RX ORDER — TAMSULOSIN HYDROCHLORIDE 0.4 MG/1
0.4 CAPSULE ORAL EVERY EVENING
Qty: 90 CAPSULE | Refills: 3 | Status: SHIPPED | OUTPATIENT
Start: 2025-08-01

## (undated) DEVICE — #15 STERILE STAINLESS BLADE: Brand: STERILE STAINLESS BLADES

## (undated) DEVICE — GAUZE SPONGES,USP TYPE VII GAUZE, 12 PLY: Brand: CURITY

## (undated) DEVICE — STERILE POLYISOPRENE POWDER-FREE SURGICAL GLOVES: Brand: PROTEXIS

## (undated) DEVICE — E-Z CLEAN, NON-STICK, PTFE COATED, ELECTROSURGICAL NEEDLE ELECTRODE, MODIFIED EXTENDED INSULATION, 2.75 INCH (7 CM): Brand: MEGADYNE

## (undated) DEVICE — SUTURE SILK 2-0

## (undated) DEVICE — ELECTRO LUBE IS A SINGLE PATIENT USE DEVICE THAT IS INTENDED TO BE USED ON ELECTROSURGICAL ELECTRODES TO REDUCE STICKING.: Brand: KEY SURGICAL ELECTRO LUBE

## (undated) DEVICE — MINI LAP PACK-LF: Brand: MEDLINE INDUSTRIES, INC.

## (undated) DEVICE — BANDAGE,GAUZE,CONFORMING,1"X75",STRL,LF: Brand: MEDLINE

## (undated) DEVICE — LIGHT HANDLE

## (undated) DEVICE — SPONGE: SPECIALTY PEANUT XR 100/CS: Brand: MEDICAL ACTION INDUSTRIES

## (undated) DEVICE — Device

## (undated) DEVICE — SUTURE PROLENE 4-0 PS-2

## (undated) DEVICE — SOLUTION IRRIG 1000ML 0.9% NACL USP BTL

## (undated) DEVICE — ELECTRODE ESURG 2.75IN EZ CLN

## (undated) DEVICE — HEAD AND NECK CDS-LF: Brand: MEDLINE INDUSTRIES, INC.

## (undated) DEVICE — SUT CHRM GUT 3-0 27IN SH ABSRB UD 26MM 1/2

## (undated) DEVICE — USE ITEM #176901

## (undated) DEVICE — SLEEVE COMPR MD KNEE LEN SGL USE KENDALL SCD

## (undated) DEVICE — SUTURE SILK 3-0

## (undated) DEVICE — PREMIUM WET SKIN PREP TRAY: Brand: MEDLINE INDUSTRIES, INC.

## (undated) DEVICE — SUTURE VICRYL 3-0 SH

## (undated) DEVICE — CAUTERY NEEDLE 2IN INS E1465

## (undated) DEVICE — UNDERPANTS INCONT 2XL KNIT MAT

## (undated) DEVICE — SOL  .9 1000ML BTL

## (undated) DEVICE — SUT CHROMIC GUT 4-0 SH G121H

## (undated) DEVICE — SLEEVE KENDALL SCD EXPRESS MED

## (undated) DEVICE — GOWN,SIRUS,FABRIC-REINFORCED,X-LARGE: Brand: MEDLINE

## (undated) DEVICE — RECTAL CDS-LF: Brand: MEDLINE INDUSTRIES, INC.

## (undated) DEVICE — 3M(TM) TEGADERM(TM) TRANSPARENT FILM DRESSING FRAME STYLE 9505W: Brand: 3M™ TEGADERM™

## (undated) DEVICE — SOL NACL IRRIG 0.9% 1000ML BTL

## (undated) DEVICE — BANDAGE COMPR W1INXL5YD FOAM COHESIVE QUIK

## (undated) DEVICE — KENDALL SCD EXPRESS SLEEVES, KNEE LENGTH, MEDIUM: Brand: KENDALL SCD

## (undated) DEVICE — UNDYED BRAIDED (POLYGLACTIN 910), SYNTHETIC ABSORBABLE SUTURE: Brand: COATED VICRYL

## (undated) DEVICE — INTENDED TO BE USED TO OCCLUDE, RETRACT AND IDENTIFY ARTERIES, VEINS, TENDONS AND NERVES IN SURGICAL PROCEDURES: Brand: STERION®  VESSEL LOOP

## (undated) DEVICE — OCCLUSIVE GAUZE STRIP OVERWRAP,3% BISMUTH TRIBROMOPHENATE IN PETROLATUM BLEND: Brand: XEROFORM

## (undated) DEVICE — GLOVE SUR 7 SENSICARE PI PIP CRM PWD F

## (undated) DEVICE — PTFE COATED BLADE 2.75': Brand: MEDLINE

## (undated) NOTE — LETTER
11/02/21        Doris Victoria  [address]  [address]      Dear Arden Robledo records indicate that you have outstanding lab work and or testing that was ordered for you and has not yet been completed:  Orders Placed This Encoun

## (undated) NOTE — LETTER
06/20/22    Elmer Aranda  [address]  [address]    6/1/1965    Celso Duenas,     Your urine protein test was normal which means currently there is no sign of  stress on kidneys from diabetes. If you have any questions or concerns please let me know .    Sincerely,         Zo Kerr, BETO  [facility] [address]   [place], [address]   [phone]

## (undated) NOTE — LETTER
[facility]  [address]e [address] [facility], [address] [place] [address]    Consent for Operation    Date: __8/9/2018________________    Time: ____0900___________    1.  I authorize the performance upon Willie Guzmán the following operation:    Central Line Placement videotape. [facility] will not be responsible for storage or maintenance of this tape. 6. For the purpose of advancing medical education, I consent to the admittance of observers to the Operating Room.     7. I authorize the use of any specimen, organs Signature of Patient:   ___________________________    When the patient is a minor or mentally incompetent to give consent:  Signature of person authorized to consent for patient: ___________________________   Relationship to patient: _____________________ these medicines may increase my risk of anesthetic complications. · If I am allergic to anything or have had a reaction to anesthesia before. 3. I understand how the anesthesia medicine will help me (benefits).     4. I understand that with any type of patient’s representative) and answered their questions. The patient or their representative has agreed to have anesthesia services.     _____________________________________________________________________________  Witness        Date   Time  I have paulino

## (undated) NOTE — LETTER
Patient Name: iLvia Oquendo  Surgery Date: 2023  Medical Record: [id number]  CSN: [id number]    Surgeon(s):  Clarissa Smith MD  Consent Procedure: ANAL EXAMINATION UNDER ANESTHESIA, FISTULOTOMY POSSIBLE SETON PLACEMENT  Anesthesia Type: General    To Attending: MD Boo Trevino)  To PCP:  Dr. Sarahy Rich REQUESTED THE FOLLOWIN [facility]  _____________________________________________________  Please note the following attached testing results for your review:   None

## (undated) NOTE — LETTER
Date: 10/8/2018    Patient Name: Emily Chow     To Whom it may concern: The above patient was seen at the [facility] for treatment of a medical condition on 8/22/18 and 9/12/18.       Patient was hospitalized at [facility] from 8/7/

## (undated) NOTE — LETTER
Date: 9/12/2018  Patient Name: Arturo Díaz    To Whom it may concern: The above patient was seen at the [facility] for treatment of his medical conditions.     The patient may return to work on 9/16/18 with no limitations/restric

## (undated) NOTE — LETTER
Date: 9/12/2018    Patient Name: Luis Manuel Charles    To Whom it may concern: The above patient was seen at the [facility] for treatment of his medical conditions. The patient may return to work on 9/16/18 with no limitations/restrictions.

## (undated) NOTE — LETTER
05/29/21        Gill Jeronimo  [address] [address]      Dear Charline Tran records indicate that you have outstanding lab work and or testing that was ordered for you and has not yet been completed:  Orders Placed This Encoun